# Patient Record
Sex: FEMALE | Race: WHITE | HISPANIC OR LATINO | Employment: OTHER | URBAN - METROPOLITAN AREA
[De-identification: names, ages, dates, MRNs, and addresses within clinical notes are randomized per-mention and may not be internally consistent; named-entity substitution may affect disease eponyms.]

---

## 2018-05-21 ENCOUNTER — TELEPHONE (OUTPATIENT)
Dept: UROGYNECOLOGY | Facility: CLINIC | Age: 38
End: 2018-05-21

## 2018-05-21 ENCOUNTER — OFFICE VISIT (OUTPATIENT)
Dept: OBSTETRICS AND GYNECOLOGY | Facility: CLINIC | Age: 38
End: 2018-05-21
Payer: MEDICAID

## 2018-05-21 VITALS
WEIGHT: 142 LBS | BODY MASS INDEX: 25.16 KG/M2 | HEIGHT: 63 IN | SYSTOLIC BLOOD PRESSURE: 100 MMHG | DIASTOLIC BLOOD PRESSURE: 60 MMHG

## 2018-05-21 DIAGNOSIS — R30.0 DYSURIA: Primary | ICD-10-CM

## 2018-05-21 DIAGNOSIS — N39.498 OTHER URINARY INCONTINENCE: ICD-10-CM

## 2018-05-21 DIAGNOSIS — R10.2 PELVIC PAIN IN FEMALE: ICD-10-CM

## 2018-05-21 PROCEDURE — 87480 CANDIDA DNA DIR PROBE: CPT

## 2018-05-21 PROCEDURE — 87086 URINE CULTURE/COLONY COUNT: CPT

## 2018-05-21 PROCEDURE — 99214 OFFICE O/P EST MOD 30 MIN: CPT | Mod: PBBFAC,PO | Performed by: OBSTETRICS & GYNECOLOGY

## 2018-05-21 PROCEDURE — 87510 GARDNER VAG DNA DIR PROBE: CPT

## 2018-05-21 PROCEDURE — 99999 PR PBB SHADOW E&M-EST. PATIENT-LVL IV: CPT | Mod: PBBFAC,,, | Performed by: OBSTETRICS & GYNECOLOGY

## 2018-05-21 PROCEDURE — 99213 OFFICE O/P EST LOW 20 MIN: CPT | Mod: S$PBB,,, | Performed by: OBSTETRICS & GYNECOLOGY

## 2018-05-21 RX ORDER — CEPHALEXIN 500 MG/1
500 CAPSULE ORAL EVERY 12 HOURS
Qty: 20 CAPSULE | Refills: 0 | Status: SHIPPED | OUTPATIENT
Start: 2018-05-21 | End: 2018-05-31

## 2018-05-21 RX ORDER — CIPROFLOXACIN 500 MG/1
TABLET ORAL
Refills: 0 | COMMUNITY
Start: 2018-04-30 | End: 2018-05-21

## 2018-05-21 NOTE — PATIENT INSTRUCTIONS
Urogynecology   [phone] [facility] Obstetrics and Gynecology, [facility] [address]   1) AILYN Lara Leise  2) Danielle Madison

## 2018-05-21 NOTE — TELEPHONE ENCOUNTER
----- Message from Analy German sent at 5/21/2018  3:11 PM CDT -----  Can you please help me schedule patient with Dr. Lara, thank you

## 2018-05-21 NOTE — PROGRESS NOTES
"38 yo female who presents to discuss problems with her urine.  Reports that since her  in 2006 she has a difficult time holding her urine. Reports that problem has become much worse in the last 2-3months.  Reports frequent UTIs that improve with medications and then return.  Patient also complains of vaginal itching that comes and goes.  Uses over the counter anti-yeast medications that help her symptoms.  But, vaginal itching always returns.    The patient is also concerned about pain in the lower pelvis. Unsure if she may have a cyst. Reports h/o ovarian cysts.    ROS: per HPI    PE:   Vitals: /60   Ht 5' 3" (1.6 m)   Wt 64.4 kg (141 lb 15.6 oz)   LMP 2018   BMI 25.15 kg/m²   APPEARANCE: Well nourished, well developed, in no acute distress.  PELVIC: Normal external female genitalia without lesions. Normal hair distribution. Adequate perineal body, normal urethral meatus. Vagina moist and well rugated without lesions or discharge. Cervix pink and without lesions. No significant cystocele or rectocele. Bimanual exam showed uterus normal size, shape, position, mobile and nontender. There is a mass anterior, palpable with deep bimanual palpation in the LLQ. Feels round.  Positive ttp. Adnexa without masses or tenderness. Urethra and bladder normal.  EXTREMITIES: No clubbing cyanosis or edema.    AP:  1) Vaginitis  Affirm collected    2) Dysuria, urinary incontinence  UA negative  Urine cx sent: rx for keflex sent  Referred to urogyn    3) pelvic pain/mass  Pelvic US ordered    F/u in 2 wks, needs pap at next visit    keyon haider MD    "

## 2018-05-22 LAB
CANDIDA RRNA VAG QL PROBE: NEGATIVE
G VAGINALIS RRNA GENITAL QL PROBE: NEGATIVE
T VAGINALIS RRNA GENITAL QL PROBE: NEGATIVE

## 2018-05-23 LAB — BACTERIA UR CULT: NO GROWTH

## 2018-05-31 ENCOUNTER — INITIAL CONSULT (OUTPATIENT)
Dept: UROGYNECOLOGY | Facility: CLINIC | Age: 38
End: 2018-05-31
Attending: OBSTETRICS & GYNECOLOGY
Payer: MEDICAID

## 2018-05-31 VITALS
WEIGHT: 144.19 LBS | BODY MASS INDEX: 25.55 KG/M2 | HEIGHT: 63 IN | DIASTOLIC BLOOD PRESSURE: 76 MMHG | SYSTOLIC BLOOD PRESSURE: 100 MMHG

## 2018-05-31 DIAGNOSIS — Z12.4 CERVICAL CANCER SCREENING: ICD-10-CM

## 2018-05-31 DIAGNOSIS — N89.8 VAGINAL DISCHARGE: ICD-10-CM

## 2018-05-31 DIAGNOSIS — R30.0 BURNING WITH URINATION: Primary | ICD-10-CM

## 2018-05-31 DIAGNOSIS — N32.89 BLADDER SPASM: ICD-10-CM

## 2018-05-31 DIAGNOSIS — K59.09 CHRONIC CONSTIPATION: ICD-10-CM

## 2018-05-31 DIAGNOSIS — R35.1 NOCTURIA MORE THAN TWICE PER NIGHT: ICD-10-CM

## 2018-05-31 DIAGNOSIS — N39.46 URINARY INCONTINENCE, MIXED: ICD-10-CM

## 2018-05-31 PROCEDURE — 51701 INSERT BLADDER CATHETER: CPT | Mod: S$PBB,,, | Performed by: OBSTETRICS & GYNECOLOGY

## 2018-05-31 PROCEDURE — 87510 GARDNER VAG DNA DIR PROBE: CPT

## 2018-05-31 PROCEDURE — 99213 OFFICE O/P EST LOW 20 MIN: CPT | Mod: PBBFAC | Performed by: OBSTETRICS & GYNECOLOGY

## 2018-05-31 PROCEDURE — 88175 CYTOPATH C/V AUTO FLUID REDO: CPT

## 2018-05-31 PROCEDURE — 99215 OFFICE O/P EST HI 40 MIN: CPT | Mod: 25,S$PBB,, | Performed by: OBSTETRICS & GYNECOLOGY

## 2018-05-31 PROCEDURE — 87086 URINE CULTURE/COLONY COUNT: CPT

## 2018-05-31 PROCEDURE — 51701 INSERT BLADDER CATHETER: CPT | Mod: PBBFAC | Performed by: OBSTETRICS & GYNECOLOGY

## 2018-05-31 PROCEDURE — 99999 PR PBB SHADOW E&M-EST. PATIENT-LVL III: CPT | Mod: PBBFAC,,, | Performed by: OBSTETRICS & GYNECOLOGY

## 2018-05-31 PROCEDURE — 87480 CANDIDA DNA DIR PROBE: CPT

## 2018-05-31 RX ORDER — OXYBUTYNIN CHLORIDE 5 MG/1
5 TABLET, EXTENDED RELEASE ORAL DAILY
Qty: 30 TABLET | Refills: 11 | Status: SHIPPED | OUTPATIENT
Start: 2018-05-31 | End: 2019-06-13

## 2018-05-31 NOTE — LETTER
May 31, 2018      Royce Ruiz MD  [address]  [address]  [facility] [address]           [facility] [address] [place] [address]  Phone: [phone]          Patient: Kenya Linares   MR Number: [id number]   YOB: 1980   Date of Visit: 5/31/2018       Dear Dr. Royce Ruiz:    Thank you for referring Kenya Linares to me for evaluation. Attached you will find relevant portions of my assessment and plan of care.    If you have questions, please do not hesitate to call me. I look forward to following Kenya Linares along with you.    Sincerely,    Sal Lara MD    Enclosure  CC:  No Recipients    If you would like to receive this communication electronically, please contact [email or web address] or [phone] to request more information on [employer] access.    For providers and/or their staff who would like to refer a patient to [facility], please contact us through our one-stop-shop provider referral line, [facility], at [phone].    If you feel you have received this communication in error or would no longer like to receive these types of communications, please e-mail [email or web address]

## 2018-05-31 NOTE — PATIENT INSTRUCTIONS
Hoja de información de fibra  Mcdonald médico le ha recomendado seguir rené dieta kolby en fibra. La adición de fibra a mcdonald dieta puede hacer rené gran diferencia en el control y la regularidad de mcdonald intestino. La fibra ayuda a las personas ya sea que pierdan las heces o tengan problemas de estreñimiento. La fibra funciona abultando las heces y manteniéndolas formadas, haciendo que el movimiento sea suave y fácil de pasar. La fibra ayuda a mantener la humedad dentro de las heces para que no ocurra diarrea ni heces duras. La fibra hace que los intestinos funcionen más regularmente, dajuan no es un laxante. René ventaja adicional de comer rené dieta kolby en fibra es que mcdonald riesgo de cáncer también se reduce.    La mayoría de nosotros ya comemos algunos alimentos ricos en fibra, dajuan claudia todos nosotros no comemos la cantidad necesaria. Por ejemplo, rené rebanada de pan integral contiene solo alrededor del 10% de la cantidad diaria recomendada de fibra. [place] significa que si confía solo en pan de ana integral para cumplir con los requisitos de fibra recomendados, tendría que comer  ¡entre 10 y 18 rebanadas al día! Tenga en cuenta que la fibra NO se encuentra en ningún producto cárnico o lácteo. Solo se encuentra en granos, vegetales y frutas. La ingesta de fibra diaria recomendada es de 20 a 25 gramos. Los alimentos que tienen un alto contenido de fibra incluyen:    Fiber One Cereal, ½ taza 13.0 g  Frijoles Urias, ¾ de taza 10.4 g  Cereal de salvado de ana, 1 oz 10.0 g  Frijoles, ¾ taza 9.3 g  Todos los cereales de salvado, ½ taza 6.0 g  Oat Bran Cereal, caliente, 1 oz 4.0 g  Plátano, 1medio 3.8 g  Peras enlatadas, ½ taza 3.7 g  3 ciruelas pasas o ¼ taza de pasas 3.5 g  Total de ana integral, 1 taza 3.0 g  Zanahorias, ½ taza 3.2 g  Apple, pequeño 2.8 g  Brócoli, ½ taza 2.8 g  Coliflor, ½ taza 2.6 g  Larisa, 1 oz 2.5 g  Whole Wheat Toast 2.0 g  Cheerios, 1 1/3 taza 2.0 g  Papa al horno con piel 2.0 g  Maíz, ½ taza 1.9  g  Palomitas de maíz, 3 tazas 1.9 g  Canova, mediano 1.9 g  Rose Marie de granola 1.0 g  Mary, ½ taza 0.9 g    Si no lachelle que puede obtener suficiente fibra a través de mcdonald dieta diaria, hay muchos buenos suplementos de fibra que puede shayy junto con mcdonald dieta kolby en fibra. Algunos de estos son: Metamucil (1 cucharadita colmada o 1-2 obleas), Citrucel (1 cucharada), Fiberall (1-2 obleas o 1 cucharadita), Perdium (2 cucharaditas redondas) y 1-2 cucharaditas de salvado sin procesar (para mezclar) con alimentos)    Es posible que necesite usar el suplemento de fibra hasta 3-4 veces al día para producir rené eliminación normal. Siga las instrucciones específicas del paquete o llámenos para obtener ayuda para regular la dosis. Puede notar un poco de hinchazón y / o aumento de gas al principio. Estos síntomas se pueden aliviar agregando fibra a mcdonald dieta lentamente. René vez que mcdonald cuerpo se acostumbre a cong aumento de fibra, estos síntomas desaparecerán.       ---------------------------------------------  Irritantes de la vejiga  Ciertos alimentos y bebidas se wilkins asociado con el empeoramiento de los síntomas de frecuencia urinaria, urgencia, incontinencia de urgencia o dolor de la vejiga. Si padece alguna de estas condiciones, puede intentar eliminar mimi o más de estos alimentos de mcdonald dieta y xuan si sukhwinder síntomas mejoran. Si los síntomas de la vejiga están relacionados con factores dietéticos, la adherencia estricta a rené dieta que minimiza la comida debería traer un alivio notable en 10 días. René vez que se sienta mejor, puede comenzar a agregar alimentos nuevamente a mcdonald dieta, mimi a la vez. Si los síntomas regresan, podrá identificar el irritante. A medida que agrega alimentos a mcdonald dieta, es muy importante que satya cantidades significativas de agua.    -------------------------------------------------- ---------------------------------------------  Lista de irritantes vesicales comunes *  Bebidas alcohólicas  Manzanas y  jugo de manzana  [facility]  Bebidas con gas  Chile y comidas picantes  Chocolate  Fruta cítrica  Café (incluido descafeinado)  Arándanos y jugo de arándano  Uvas  [place]  Productos lácteos: leche, queso, requesón, yogur, helado  Melocotones  Benavides  Ciruelas  Fresas  Azúcar especialmente edulcorantes artificiales, sacarina, aspartamo, edulcorantes de maíz, miel, fructosa, sacarosa, lactosa  Té  Tomates y jugo de tomate  Complejo de vitamina B  Vinagre  * La mayoría de las personas no son sensibles a TODOS estos productos; mcdonald objetivo es encontrar los alimentos que empeoran sukhwinder síntomas.  -------------------------------------------------- -------------------------------------------------    Las sustituciones de frutas bajas en ácido incluyen albaricoques, papaya, peras y sandía. Los bebedores de café pueden beber Kava u otras bebidas instantáneas de bajo contenido en ácido. Los bebedores de té pueden sustituir los tés no cítricos a base de hierbas y sol. El carbonato de calcio co-tamponado con ascorbato de calcio puede ser sustituido por vitamina C. Prelief es un suplemento dietético que funciona julianne un bloqueador de ácido para la vejiga.    Dónde obtener más información:   Superación de los trastornos de la vejiga por Elizabeth Soni y Karel Lala, 1990   ¡No tienes que vivir con cistitis! Por Bhavna Hdz, 1988  [email or web address]    -------------------------------------------------------------------------------------    1) Incontinencia urinaria mixta, urgencia <estrés:  --urine C & S enviado hoy  - Vacíe la vejiga cada 3 horas. Vacíe iam: espere un minuto e inclínese hacia adelante en el inodoro.  - Evite los irritantes dietéticos (patricia la hoja). Mantenga un diario x 3-5 días para determinar sukhwinder irritantes.  --KEGELS: haz 10 en AM y 10 en PM, manteniendo cada x 10 segundos. Cuando sienta la necesidad de ir, DETÉNGASE, KEGEL, y cuando haya pasado la urgencia,  vaya al baño. Comience el piso pélvico PT. Por favor llame dentro de unos días para hacer rené jose: Brigitte Zamora ([facility] [place]): (p) [phone] / [phone]. (f) [phone]. Llamada de pacientes establecida: [phone].  --URGE: start Oxybutynin 5 xl al día. Isabelle de 2 a 4 semanas para xuan si tendrá efecto. Para la boca seca: obtenga pastillas o chicles amargos, sin azúcar.  --STRESS: Pessary vs. Sling.    2) Ardor al orinar,? Espasmos de vejiga:  --urine C & S  --mycoplasma / ureaplasma (neg C & S recientemente)  -trabajo en controlar el impulso según el n. ° 1  - medicamento antiespamático de inicio: Oxybutynin 5 xl al día    3) descarga vaginal:  --firma enviada    4) Estreñimiento:  Controlar el estreñimiento puede ayudar a la urgencia / fuga de la vejiga y la fibra puede controlar mejor el colesterol y la glucosa en toño. Comience la fibra diaria. [place] 1 cucharadita de fibra en polvo (psyllium u otro polvo sin azúcar). Mezcle en 8 onzas de agua. Isabelle x 3-5 días. Luego, aumente la fibra en 1 cucharadita cada 3-5 días hasta que las heces ortega fáciles de pasar. Deténgase y continúe con kasia dosis. No exceda 6 cucharadas / día. También puede usar suavizante para heces sin receta 1-2 x / día. EVITAR laxantes.    5) Nocturia (micción nocturna): suspenda los líquidos 2 horas antes de acostarse / sin agua junto a la cama. Si tiene hinchazón en la pierna: eleve los pies por encima del pecho x 1 hora antes de acostarse para eliminar el exceso de líquido. También puede usar rené manguera de soporte (altas de rodilla).    6) Regrese a la clínica en 3 meses.

## 2018-05-31 NOTE — PROGRESS NOTES
[facility] [address] [place] [address]    Kenya Linares  [id number]  1980  May 31, 2018    Consulting Physician: Royce Ruiz MD   GYN: Dr. Joseph Sorenson M.D.: Primary Doctor No    Chief Complaint   Patient presents with    Advice Only     urinary incontinence       HPI:     36yo  with urinary incontinence, referred by Dr. Ruiz.    1)  UI:  (+) RE > (--) UUI  X multiple times per day (anytime laugh/cough/sneeze/valsalva).  (+) pads:1/day, usually severe wetness and none at night.  Daytime frequency: Q 1 hours.  Nocturia: Yes: 1/night.   (+) dysuria, currently taking keflex (neg Ucx )--this has improved but still present; is having some spasms in SP area x 3 d;   (--) hematuria,  (--) frequent UTIs.  (--) complete bladder emptying. Urinary urgency and frequency, no leakage with urge symptoms.    2)  POP:  Absent. (+) vaginal bleeding (menstrual cycles regular). (+) vaginal discharge after taking antibitoics. (+) sexually active.  (--) dyspareunia.   (+)  Vaginal dryness.  (--) vaginal estrogen use.     3)  BM:  (+) constipation/straining. Taking miralax occasionally.  (--) chronic diarrhea. (--) hematochezia.  (--) fecal incontinence.  (--) fecal smearing/urgency.  (+) complete evacuation.     Past Medical History  Past Medical History:   Diagnosis Date    PONV (postoperative nausea and vomiting)         Past Surgical History  Past Surgical History:   Procedure Laterality Date     SECTION      TUBAL LIGATION     cs x 1 (pfannenstiel)   Lap BS 2016    Hysterectomy: No    Past Ob History    C/s x 1.    Largest infant weight: 4lb  no FAVD. no episiotomy.      Gynecologic History  LMP: Patient's last menstrual period was 2018 (exact date).  Age of menarche: 14  Age of menopause: n/a  Menstrual history: regular cycles, q28 days, moderate flow  Pap test: 3 years ago - normal.  History of abnormal paps: No.  History of STIs:  " No  Mammogram: Date of last: n/a.  Colonoscopy: Date of last: n/a   DEXA:  Date of last: n/a    Family History  Family History   Problem Relation Age of Onset    Hypertension Mother     Cervical cancer Maternal Aunt     Cancer Paternal Aunt     Breast cancer Maternal Grandmother       Colon CA: No  Breast CA: Yes - MGM  GYN CA: No   CA: No    Social History  History   Smoking Status    Never Smoker   Smokeless Tobacco    Not on file   .  History   Alcohol Use No   .    History   Drug use: Unknown   .  The patient is engaged.  Resides in [place] [address].  Employment status: currently employed.    Self employed    Allergies  Review of patient's allergies indicates:   Allergen Reactions    Roxicet [oxycodone-acetaminophen] Itching and Edema     Starting taking Roxicet Friday.  Starting experieincing facial swelling and general itching Saturday.       Medications  Current Outpatient Prescriptions on File Prior to Visit   Medication Sig Dispense Refill    cephALEXin (KEFLEX) 500 MG capsule Take 1 capsule (500 mg total) by mouth every 12 (twelve) hours. 20 capsule 0     No current facility-administered medications on file prior to visit.        Review of Systems A 14 point ROS was reviewed with pertinent positives as noted above in the history of present illness.      Constitutional: negative  Eyes: negative  Endocrine: negative  Gastrointestinal: negative  Cardiovascular: negative  Respiratory: negative  Allergic/Immunologic: negative  Integumentary: negative  Psychiatric: negative  Musculoskeletal: negative   Ear/Nose/Throat: negative  Neurologic: negative  Genitourinary: SEE HPI  Hematologic/Lymphatic: negative   Breast: negative    Urogynecologic Exam  /76 (BP Location: Right arm, Patient Position: Sitting, BP Method: Large (Manual))   Ht 5' 3" (1.6 m)   Wt 65.4 kg (144 lb 2.9 oz)   LMP 05/18/2018 (Exact Date)   BMI 25.54 kg/m²     GENERAL APPEARANCE:  The patient is well-developed, " well-nourished.   Neck:  Supple with no thyromegaly, no carotid bruits.  Heart:  Regular rate and rhythm, no murmurs, rubs or gallops.  Lungs:  Clear.  No CVA tenderness.  Abdomen:  Soft, nontender, nondistended, no hepatosplenomegaly.  Incisions:  Pfanennstiel and LSC well-healed    PELVIC:    External genitalia:  Normal Bartholins, Skenes and labia bilaterally.    Urethra:  No caruncle, diverticulum or masses.  (+) hypermobility.    Vagina:  Atrophy (--) , no bladder masses or tender, no discharge.  Affirm collected per patient symptoms.   Cervix:  normal appearance; pap collected  Uterus: normal size, contour, position, consistency, mobility, non-tender  Adnexa: Not palpable.    POP-Q:    Deferred.  No obvious POP present with valsalva.     NEUROLOGIC:  Cranial nerves 2 through 12 intact.  Strength 5/5.  DTRs 2+ lower extremities.  S2 through 4 normal.  Sacral reflexes intact.    EXT: MCDUFFIE, 2+ pulses bilaterally, no C/C/E    COUGH STRESS TEST:  negative  KEGEL: 3 /5    RECTAL:    External:  Normal, (--) hemorrhoids, (--) dovetailing.   Internal:  deferred    PVR: 10 mL    Impression    1. Burning with urination    2. Cervical cancer screening    3. Bladder spasm    4. Urinary incontinence, mixed    5. Chronic constipation    6. Nocturia more than twice per night    7. Vaginal discharge        Initial Plan  The patient was counseled regarding these issues. The patient was given a summary sheet containing each of these issues with possible options for evaluation and management. When appropriate, we also reviewed computer-generated diagrams specific to their diagnoses..  All questions were addressed to the patient's satisfaction.    1)  Mixed urinary incontinence, urge < stress:    --urine C&S sent today  --Empty bladder every 3 hours.  Empty well: wait a minute, lean forward on toilet.    --Avoid dietary irritants (see sheet).  Keep diary x 3-5 days to determine your irritants.  --KEGELS: do 10 in AM and 10 in PM,  holding each x 10 seconds.  When you feel urge to go, STOP, KEGEL, and when urge has passed, then go to bathroom.  Start pelvic floor PT.  Please call in a few days to make an appointment:  Brigitte Zamora ([facility] (p) [phone]. (f) [phone]. Established patients call:  [phone].  --URGE: start Oxybutynin 5 xl daily Takes 2-4 weeks to see if will have effect.  For dry mouth: get sour, sugar free lozenge or gum.    --STRESS:  Pessary vs. Sling.     2)  Burning with urination, ? Bladder spasms:  --urine C&S  --mycoplasma/ureaplasma (neg C&S recently)  --work on controlling urge as per #1  --start anti-spasm medication:  Oxybutynin 5 xl daily    3)  Vaginal discharge:  --affirm sent    4)  Constipation:  Controlling constipation may help bladder urgency/leakage and fiber may better control cholesterol and blood glucose.  Start daily fiber.  Take 1 tsp of fiber powder (psyllium or other sugar-free powder).  Mix in 8 oz of water.  Take x 3-5 days.  Then, increase fiber by 1 tsp every 3-5 days until stool is easy to pass.  Stop and continue at that dose.   Do not exceed 6 tsps/day.  May also use over the counter stool softener 1-2 x/day.  AVOID laxatives.    5)  Nocturia (nighttime urination): stop fluids 2 hours before bed/no water by the bed.  If have leg swelling:  Elevate feet above chest x 1 hour before bed to get excess fluid off.  Can also use support hose (knee highs).      6)  Return to clinic in 3 months.     Approximately 60 min were spent in consult, 90 % in discussion.     Thank you for requesting consultation of your patient.  I look forward to participating in their care.  Patient mostly fluent in spoken English.  Daughter was present to assist if needed, but no major assist needed.     Sal Lara  Female Pelvic Medicine and Reconstructive Surgery  [facility] [place], [place]

## 2018-06-02 LAB — BACTERIA UR CULT: NO GROWTH

## 2018-06-07 ENCOUNTER — TELEPHONE (OUTPATIENT)
Dept: OBSTETRICS AND GYNECOLOGY | Facility: CLINIC | Age: 38
End: 2018-06-07

## 2018-06-08 ENCOUNTER — PROCEDURE VISIT (OUTPATIENT)
Dept: OBSTETRICS AND GYNECOLOGY | Facility: CLINIC | Age: 38
End: 2018-06-08
Payer: MEDICAID

## 2018-06-08 ENCOUNTER — OFFICE VISIT (OUTPATIENT)
Dept: OBSTETRICS AND GYNECOLOGY | Facility: CLINIC | Age: 38
End: 2018-06-08
Payer: MEDICAID

## 2018-06-08 VITALS
WEIGHT: 141.56 LBS | HEIGHT: 63 IN | BODY MASS INDEX: 25.08 KG/M2 | SYSTOLIC BLOOD PRESSURE: 100 MMHG | DIASTOLIC BLOOD PRESSURE: 60 MMHG

## 2018-06-08 DIAGNOSIS — B96.89 BV (BACTERIAL VAGINOSIS): ICD-10-CM

## 2018-06-08 DIAGNOSIS — N76.0 BV (BACTERIAL VAGINOSIS): ICD-10-CM

## 2018-06-08 DIAGNOSIS — R10.2 PELVIC PAIN IN FEMALE: ICD-10-CM

## 2018-06-08 DIAGNOSIS — R10.2 PELVIC PAIN IN FEMALE: Primary | ICD-10-CM

## 2018-06-08 PROCEDURE — 99999 PR PBB SHADOW E&M-EST. PATIENT-LVL III: CPT | Mod: PBBFAC,,, | Performed by: OBSTETRICS & GYNECOLOGY

## 2018-06-08 PROCEDURE — 76830 TRANSVAGINAL US NON-OB: CPT | Mod: PBBFAC,PO

## 2018-06-08 PROCEDURE — 99212 OFFICE O/P EST SF 10 MIN: CPT | Mod: S$PBB,25,, | Performed by: OBSTETRICS & GYNECOLOGY

## 2018-06-08 PROCEDURE — 99213 OFFICE O/P EST LOW 20 MIN: CPT | Mod: PBBFAC,PO | Performed by: OBSTETRICS & GYNECOLOGY

## 2018-06-08 PROCEDURE — 76830 TRANSVAGINAL US NON-OB: CPT | Mod: 26,S$PBB,, | Performed by: OBSTETRICS & GYNECOLOGY

## 2018-06-08 RX ORDER — METRONIDAZOLE 500 MG/1
TABLET ORAL
Qty: 30 TABLET | Refills: 3 | Status: SHIPPED | OUTPATIENT
Start: 2018-06-08 | End: 2018-12-19

## 2018-06-08 NOTE — PROGRESS NOTES
36 yo female who presents to discuss US results.  US shows no abnormalities.  Patient verbalizes understanding.  Patient is worried about discharge with odor that comes and goes with her cycle.  rx for flagyl provided to treat BV infections.    keyon haider MD

## 2018-06-19 ENCOUNTER — PATIENT MESSAGE (OUTPATIENT)
Dept: OBSTETRICS AND GYNECOLOGY | Facility: CLINIC | Age: 38
End: 2018-06-19

## 2018-06-19 NOTE — TELEPHONE ENCOUNTER
Gokul doctora Joseph Crane. Puede cambiarme el medicamento que me ashlyn a shayy para la vaginitis bacteriana. Yo hice el ciclo de 7 días y no funcionó. Henriqueyair Linares

## 2018-06-22 DIAGNOSIS — N76.0 BV (BACTERIAL VAGINOSIS): Primary | ICD-10-CM

## 2018-06-22 DIAGNOSIS — B96.89 BV (BACTERIAL VAGINOSIS): Primary | ICD-10-CM

## 2018-06-22 RX ORDER — CLINDAMYCIN HYDROCHLORIDE 300 MG/1
300 CAPSULE ORAL EVERY 8 HOURS
Qty: 30 CAPSULE | Refills: 0 | Status: SHIPPED | OUTPATIENT
Start: 2018-06-22 | End: 2018-07-02

## 2018-11-17 ENCOUNTER — PATIENT MESSAGE (OUTPATIENT)
Dept: OBSTETRICS AND GYNECOLOGY | Facility: CLINIC | Age: 38
End: 2018-11-17

## 2018-11-19 ENCOUNTER — TELEPHONE (OUTPATIENT)
Dept: OBSTETRICS AND GYNECOLOGY | Facility: CLINIC | Age: 38
End: 2018-11-19

## 2018-11-19 DIAGNOSIS — R10.2 PELVIC PAIN: Primary | ICD-10-CM

## 2018-11-19 NOTE — TELEPHONE ENCOUNTER
Gokul Karoline Crane N. Me pude alayna rené receta de clindamicyn. Usted me ashlyn el metronidazole en tabletas dajuan cong no me funciona. La ultima ves cambiamos el medicamento para el clindamicyn y me funciono. Si puede me le pone   refill por favor.   Henrique. Michael Linares.

## 2018-11-19 NOTE — TELEPHONE ENCOUNTER
----- Message from [facility], System Message sent at 11/19/2018  2:58 PM CST -----      Appointment Request From: Kenya Linares      With Provider: Royce Ruiz MD [[facility] - OB/GYN]      Preferred Date Range: Any      Preferred Times: Cualquier hora      Reason for visit: Me siento inflamada y con dolor de ovarios      Comments:   Me siento inflamada y con dolor de ovarios.

## 2018-11-20 RX ORDER — MELOXICAM 15 MG/1
15 TABLET ORAL DAILY PRN
Qty: 30 TABLET | Refills: 0 | Status: SHIPPED | OUTPATIENT
Start: 2018-11-20 | End: 2019-06-13

## 2018-11-20 NOTE — TELEPHONE ENCOUNTER
Patient is still complaining of having rt ovary pain along with inflammation. Patient is scheduled to see Dr. Ruiz 12/11/2018 at 8:45am, but patient would like something for pain and inflammation called in to hold her til her appointment. Please advise

## 2018-11-21 DIAGNOSIS — N76.1 CHRONIC VAGINITIS: Primary | ICD-10-CM

## 2018-11-21 RX ORDER — CLINDAMYCIN HYDROCHLORIDE 300 MG/1
300 CAPSULE ORAL 2 TIMES DAILY
Qty: 30 CAPSULE | Refills: 4 | Status: SHIPPED | OUTPATIENT
Start: 2018-11-21 | End: 2018-11-28

## 2018-12-07 ENCOUNTER — TELEPHONE (OUTPATIENT)
Dept: UROGYNECOLOGY | Facility: CLINIC | Age: 38
End: 2018-12-07

## 2018-12-07 NOTE — TELEPHONE ENCOUNTER
Pradeep Saucedo,    Can you contact the pt and help her schedule a follow up with Dr. Lara. The pt has medicaid as insurance so it may a while to get the pt scheduled.    Thank you,  Cruz

## 2018-12-19 ENCOUNTER — OFFICE VISIT (OUTPATIENT)
Dept: OBSTETRICS AND GYNECOLOGY | Facility: CLINIC | Age: 38
End: 2018-12-19
Payer: MEDICAID

## 2018-12-19 VITALS
HEIGHT: 63 IN | DIASTOLIC BLOOD PRESSURE: 64 MMHG | SYSTOLIC BLOOD PRESSURE: 120 MMHG | WEIGHT: 140.63 LBS | BODY MASS INDEX: 24.92 KG/M2

## 2018-12-19 DIAGNOSIS — N76.1 CHRONIC VAGINITIS: Primary | ICD-10-CM

## 2018-12-19 PROCEDURE — 99999 PR PBB SHADOW E&M-EST. PATIENT-LVL III: CPT | Mod: PBBFAC,,, | Performed by: OBSTETRICS & GYNECOLOGY

## 2018-12-19 PROCEDURE — 99213 OFFICE O/P EST LOW 20 MIN: CPT | Mod: PBBFAC,PO | Performed by: OBSTETRICS & GYNECOLOGY

## 2018-12-19 PROCEDURE — 99212 OFFICE O/P EST SF 10 MIN: CPT | Mod: S$PBB,,, | Performed by: OBSTETRICS & GYNECOLOGY

## 2018-12-19 RX ORDER — METRONIDAZOLE 7.5 MG/G
1 GEL VAGINAL NIGHTLY
Qty: 70 G | Refills: 3 | Status: SHIPPED | OUTPATIENT
Start: 2018-12-19 | End: 2018-12-24

## 2018-12-19 RX ORDER — LEVONORGESTREL / ETHINYL ESTRADIOL AND ETHINYL ESTRADIOL 150-30(84)
1 KIT ORAL DAILY
Qty: 90 EACH | Refills: 4 | Status: SHIPPED | OUTPATIENT
Start: 2018-12-19 | End: 2019-06-13

## 2018-12-19 NOTE — PROGRESS NOTES
39 yo female who presents to discuss chronic BV.  Reports that vaginal discharge with odor/irriation occurs after every menstrual cycle.  She desires to try OCP x 3 months to see if having less menstrual cycles will decrease episodes of BV.  On vaginal exam, white discharge noted; no odor.    Rx for seasonique provided.    keyon haider MD

## 2019-01-24 ENCOUNTER — OFFICE VISIT (OUTPATIENT)
Dept: UROGYNECOLOGY | Facility: CLINIC | Age: 39
End: 2019-01-24
Payer: MEDICAID

## 2019-01-24 VITALS
SYSTOLIC BLOOD PRESSURE: 114 MMHG | DIASTOLIC BLOOD PRESSURE: 78 MMHG | WEIGHT: 141.75 LBS | BODY MASS INDEX: 25.12 KG/M2 | HEIGHT: 63 IN

## 2019-01-24 DIAGNOSIS — N39.46 URINARY INCONTINENCE, MIXED: ICD-10-CM

## 2019-01-24 DIAGNOSIS — R30.0 BURNING WITH URINATION: ICD-10-CM

## 2019-01-24 DIAGNOSIS — Z90.79 STATUS POST BILATERAL SALPINGECTOMY: ICD-10-CM

## 2019-01-24 DIAGNOSIS — N32.89 BLADDER SPASM: ICD-10-CM

## 2019-01-24 DIAGNOSIS — N89.8 VAGINAL DISCHARGE: ICD-10-CM

## 2019-01-24 DIAGNOSIS — N39.41 URINARY INCONTINENCE, URGE: Primary | ICD-10-CM

## 2019-01-24 DIAGNOSIS — R35.1 NOCTURIA MORE THAN TWICE PER NIGHT: ICD-10-CM

## 2019-01-24 DIAGNOSIS — K59.09 CHRONIC CONSTIPATION: ICD-10-CM

## 2019-01-24 PROCEDURE — 99999 PR PBB SHADOW E&M-EST. PATIENT-LVL III: CPT | Mod: PBBFAC,,, | Performed by: OBSTETRICS & GYNECOLOGY

## 2019-01-24 PROCEDURE — 99213 PR OFFICE/OUTPT VISIT, EST, LEVL III, 20-29 MIN: ICD-10-PCS | Mod: S$PBB,,, | Performed by: OBSTETRICS & GYNECOLOGY

## 2019-01-24 PROCEDURE — 99213 OFFICE O/P EST LOW 20 MIN: CPT | Mod: S$PBB,,, | Performed by: OBSTETRICS & GYNECOLOGY

## 2019-01-24 PROCEDURE — 99999 PR PBB SHADOW E&M-EST. PATIENT-LVL III: ICD-10-PCS | Mod: PBBFAC,,, | Performed by: OBSTETRICS & GYNECOLOGY

## 2019-01-24 PROCEDURE — 99213 OFFICE O/P EST LOW 20 MIN: CPT | Mod: PBBFAC | Performed by: OBSTETRICS & GYNECOLOGY

## 2019-01-24 RX ORDER — TROSPIUM CHLORIDE 20 MG/1
20 TABLET, FILM COATED ORAL 2 TIMES DAILY
Qty: 60 TABLET | Refills: 11 | Status: SHIPPED | OUTPATIENT
Start: 2019-01-24 | End: 2019-06-13

## 2019-01-24 NOTE — PATIENT INSTRUCTIONS
Los ejercicios de Kegel:    Los ejercicios de Kegel: René guía de instrucciones para las mujeres   Los ejercicios de Kegel pueden ayudar a prevenir o controlar la incontinencia urinaria y otros problemas del piso pélvico. He aquí rené guía paso a paso para hacer los ejercicios de Kegel correctamente. Por [facility] Staff       Los ejercicios de Kegel fortalecen los músculos del suelo pélvico, que sostienen el útero, la vejiga, el intestino hannah y el recto. Usted puede hacer ejercicios de Kegel, también conocido julianne entrenamiento de los músculos del suelo pélvico, discretamente claudia en cualquier momento.     Comience por entender lo que los ejercicios de Kegel pueden hacer por usted - a continuación, siga paso a paso las instrucciones para contraer y relajar los músculos del suelo pélvico.       ¿Por qué los ejercicios de Kegel importa         Hay muchos factores que pueden debilitar los músculos del suelo pélvico, julianne el embarazo, el parto, la cirugía, el envejecimiento y el sobrepeso.     Usted puede beneficiarse de hacer los ejercicios de Kegel si:    Fuga unas gotas de orina, mientras que estornudar, reír o toser    Tener rené bin y repentina de orinar duncan antes de perder rené gran cantidad de orina (incontinencia urinaria)    heces de fugas (incontinencia fecal)     Los ejercicios de Kegel se pueden hacer ira el embarazo o después del parto para tratar de prevenir la incontinencia urinaria. Ejercicios de Kegel - junto con el asesoramiento y la terapia sexual - también puede ser útil para las mujeres que tienen dificultad persistente para alcanzar el orgasmo.     Tenga en cuenta que los ejercicios de Kegel son menos útiles para las mujeres que tienen graves pérdidas de orina al estornudar, toser o reír. Además, los ejercicios de Kegel no son útiles para las mujeres que inesperadamente carlyn escapar pequeñas cantidades de orina debido a rené vejiga llena (incontinencia por rebosamiento).       Cómo  hacer los ejercicios de Kegel         Se requiere diligencia para identificar los músculos del suelo pélvico y aprender a contraer y relajar ellos. Éstos son algunos consejos:    Encontrar los músculos correctos. Para identificar los músculos del suelo pélvico, deje de orinar en medio de la corriente. Si tienes éxito, tienes los músculos correctos.    Perfecciona tu técnica. René vez que haya identificado los músculos del suelo pélvico, vaciar mcdonald vejiga y se acueste boca arriba. Apriete los músculos del suelo pélvico, mantenga la contracción ira lulu segundos y después relájese ira lulu segundos. Inténtelo cuatro o lulu veces seguidas. Trabajar hasta mantener los músculos contraídos ira 10 segundos a la vez, relajante ira 10 segundos entre las contracciones.    Mantenga mcdonald enfoque. Para obtener los mejores resultados, se centran en sólo apretar los músculos del piso pélvico. Tenga cuidado de no flexionar los músculos en el abdomen, los muslos o las nalgas. Evite sostener la respiración. En mcdonald lugar, respirar libremente ira los ejercicios.    Repita 3 veces al día. Apunte por lo menos yasmin series de 10 repeticiones al día.     No rustam un hábito de utilizar los ejercicios de Kegel para iniciar y detener el flujo de orina. Hacer los ejercicios de Kegel mientras vacía mcdonald vejiga en realidad puede debilitar los músculos, así julianne llevar a un vaciamiento incompleto de la vejiga - lo que aumenta el riesgo de rené infección urinaria.     ----------------------------------------------------------------------------    1) incontinencia urinaria mixta, urgencia <estrés:  - Vaciar la vejiga cada 3 horas. Vaciar ima: espere un minuto, inclínese hacia kirt en el inodoro.  --Evitar los irritantes dietéticos (xuan hoja). Mantenga un diario x 3-5 días para determinar sukhwinder irritantes.  - VANE: haz 10 en AM y 10 en PM, manteniendo cada x 10 segundos. Cuando sienta ganas de ir, KATHY, HALEY, y cuando la  urgencia haya pasado, vaya al baño. Podría permitirse el lujo del suelo pélvico PT.  --URGE: Comience Sanctura 20 mg 1-2 veces / día. Si se seca demasiado después de shayy 3-4 semanas, envíeme un mensaje o llame. No se pudo tolerar Oxybutynin 5 xl diariamente (xerostomía). El seguro también cubre detrol 4 LA y flavoxate. Isabelle 2-4 semanas para xuan si tendrá efecto. Para la boca seca: agréguele la pastilla o el chicle sin azúcar.  --STRESS: Pesario vs. Sling.    2) ¿Ardor al orinar? Espasmos vesicales:  --urine C&S neg 5/18  --mycoplasma / ureaplasma (neg C&S recientemente)  - Trabajar en el control de la urgencia julianne en # 1  - Comience un nuevo medicamento contra el espasmo.    3) Estreñimiento:  --continuar para evitar esforzarse con movimientos intestinales  --continuar con la fibra diaria    5) Nocturia (micción nocturna): detenga los líquidos 2 horas antes de acostarse / no hay agua en la cama. Si tiene hinchazón en las piernas: levante los pies por encima del pecho x 1 hora antes de acostarse para eliminar el exceso de líquido. También se puede usar manguera de apoyo (rodilla kolby).    6)  Regreso a la clínica en 3 meses.

## 2019-01-24 NOTE — PROGRESS NOTES
Urogyn follow up  2019    [facility] [address] [id number]    Kenya Linares  [id number]  1980      Kenya Linares is a 38 y.o. here for a urogyn follow up.    36yo  with urinary incontinence, referred by Dr. Ruiz.    1)  UI:  (+) RE > (--) UUI  X multiple times per day (anytime laugh/cough/sneeze/valsalva).  (+) pads:1/day, usually severe wetness and none at night.  Daytime frequency: Q 1 hours.  Nocturia: Yes: 1/night.   (+) dysuria, currently taking keflex (neg Ucx )--this has improved but still present; is having some spasms in SP area x 3 d;   (--) hematuria,  (--) frequent UTIs.  (--) complete bladder emptying. Urinary urgency and frequency, no leakage with urge symptoms.    2)  POP:  Absent. (+) vaginal bleeding (menstrual cycles regular). (+) vaginal discharge after taking antibitoics. (+) sexually active.  (--) dyspareunia.   (+)  Vaginal dryness.  (--) vaginal estrogen use.     3)  BM:  (+) constipation/straining. Taking miralax occasionally.  (--) chronic diarrhea. (--) hematochezia.  (--) fecal incontinence.  (--) fecal smearing/urgency.  (+) complete evacuation.     Past Medical History  Past Medical History:   Diagnosis Date    PONV (postoperative nausea and vomiting)         Past Surgical History  Past Surgical History:   Procedure Laterality Date     SECTION      [id number] Bilateral 2016    Performed by Royce Ruiz [facility] at [facility]    TUBAL LIGATION     cs x 1 (pfannenstiel)   Lap BS 2016    Hysterectomy: No    Past Ob History    C/s x 1.    Largest infant weight: 4lb  no FAVD. no episiotomy.      Gynecologic History  LMP: No LMP recorded (within weeks).  Age of menarche: 14  Age of menopause: n/a  Menstrual history: regular cycles, q28 days, moderate flow  Pap test: 3 years ago - normal.  History of abnormal paps: No.  History of STIs:  No  Mammogram: Date of last:  "n/a.  Colonoscopy: Date of last: n/a   DEXA:  Date of last: n/a      Issues include:  Patient Active Problem List   Diagnosis    Preoperative exam for gynecologic surgery    Status post bilateral salpingectomy    Burning with urination    Bladder spasm    Urinary incontinence, mixed    Chronic constipation    Nocturia more than twice per night    Vaginal discharge       History since last visit:    1)  Mixed urinary incontinence, urge < stress:    --did not notice dietary irriants  --didn't go to PT--too expensive; hasn't been doing Kegels  --tried Oxybutynin 5 xl daily; worked well to control UI; couldn't tolerate SE: xerostomia, trouble swallowing    2)  Burning with urination, ? Bladder spasms:  --urine C&S neg  --mycoplasma/ureaplasma (neg C&S recently)  --work on controlling urge as per #1  --tried Oxybutynin 5 xl daily; worked well to control UI; couldn't tolerate SE: xerostomia, trouble swallowing    3)  Vaginal discharge:  --affirm 5/18 neg    4)  Constipation:  --taking fiber daily, which is controlling     5)  Nocturia (nighttime urination):   --still 1x/PM  --has tried to limit fluid before bed/at night    Medications:    Current Outpatient Medications:     L norgest/e.estradiol-e.estrad (SEASONIQUE) 0.15 mg-30 mcg (84)/10 mcg (7) 3MPk, Take 1 tablet by mouth once daily., Disp: 90 each, Rfl: 4    meloxicam (MOBIC) 15 MG tablet, Take 1 tablet (15 mg total) by mouth daily as needed for Pain., Disp: 30 tablet, Rfl: 0    oxybutynin (DITROPAN-XL) 5 MG TR24, Take 1 tablet (5 mg total) by mouth once daily., Disp: 30 tablet, Rfl: 11    trospium (SANCTURA) 20 mg Tab tablet, Take 1 tablet (20 mg total) by mouth 2 (two) times daily., Disp: 60 tablet, Rfl: 11    ROS:  As per HPI.      Exam  /78 (BP Location: Right arm, Patient Position: Sitting, BP Method: Medium (Manual))   Ht 5' 3" (1.6 m)   Wt 64.3 kg (141 lb 12.1 oz)   LMP  (Within Weeks)   BMI 25.11 kg/m²   General: alert and oriented, no " acute distress  Remainder of PE deferred.     Impression  1. Urinary incontinence, urge  trospium (SANCTURA) 20 mg Tab tablet   2. Bladder spasm     3. Burning with urination     4. Nocturia more than twice per night     5. Status post bilateral salpingectomy     6. Urinary incontinence, mixed     7. Vaginal discharge     8. Chronic constipation       We reviewed the above issues and discussed options for short-term versus long-term management of her problems.   Plan:     1)  Mixed urinary incontinence, urge < stress:    --Empty bladder every 3 hours.  Empty well: wait a minute, lean forward on toilet.    --Avoid dietary irritants (see sheet).  Keep diary x 3-5 days to determine your irritants.  --KEGELS: do 10 in AM and 10 in PM, holding each x 10 seconds.  When you feel urge to go, STOP, KEGEL, and when urge has passed, then go to bathroom.  Could to afford pelvic floor PT.   --URGE: Start Sanctura 20 mg 1-2 times/day.  If too drying after taking 3-4 weeks, send me a message or call.  Could not tolerate Oxybutynin 5 xl daily (xerostomia). Insurance also covers detrol 4 LA and flavoxate. Takes 2-4 weeks to see if will have effect.  For dry mouth: get sour, sugar free lozenge or gum.    --STRESS:  Pessary vs. Sling.     2)  Burning with urination, ? Bladder spasms:  --urine C&S neg 5/18  --mycoplasma/ureaplasma (neg C&S recently)  --work on controlling urge as per #1  --start new anti-spasm medication    3)  Constipation:  --continue to avoid straining with bowel movements  --continue daily fiber    5)  Nocturia (nighttime urination): stop fluids 2 hours before bed/no water by the bed.  If have leg swelling:  Elevate feet above chest x 1 hour before bed to get excess fluid off.  Can also use support hose (knee highs).      6)  Return to clinic in 3 months.     30 minutes were spent in face to face time with this patient  100 % of this time was spent in counseling and/or coordination of care     Sal Lara,  MD  [facility]  Division of Female Pelvic Medicine and Reconstructive Surgery  Department of Obstetrics & Gynecology      Approximately 60 min were spent in consult, 90 % in discussion.     Thank you for requesting consultation of your patient.  I look forward to participating in their care.  Patient mostly fluent in spoken English.  Daughter was present to assist if needed, but no major assist needed.     Sal Lara  Female Pelvic Medicine and Reconstructive Surgery  [facility] [place], [place]

## 2019-06-13 ENCOUNTER — OFFICE VISIT (OUTPATIENT)
Dept: FAMILY MEDICINE | Facility: HOSPITAL | Age: 39
End: 2019-06-13
Attending: FAMILY MEDICINE
Payer: MEDICAID

## 2019-06-13 VITALS
HEART RATE: 76 BPM | WEIGHT: 142 LBS | HEIGHT: 64 IN | SYSTOLIC BLOOD PRESSURE: 107 MMHG | BODY MASS INDEX: 24.24 KG/M2 | DIASTOLIC BLOOD PRESSURE: 70 MMHG

## 2019-06-13 DIAGNOSIS — Z00.01 ENCOUNTER FOR WELL ADULT EXAM WITH ABNORMAL FINDINGS: Primary | ICD-10-CM

## 2019-06-13 PROCEDURE — 99213 OFFICE O/P EST LOW 20 MIN: CPT | Performed by: STUDENT IN AN ORGANIZED HEALTH CARE EDUCATION/TRAINING PROGRAM

## 2019-06-13 NOTE — PROGRESS NOTES
Subjective:       Patient ID: Kenya Linares is a 38 y.o. female.    Chief Complaint: Annual Exam    HPI Ms. Linares is a 39 y/o white female with a PMHx of OAB that presents to the clinic today for her Annual Exam. She states that her cholesterol level last year was 250 and she would like to have her lipid levels rechecked today. She attempted to make dietary changes for weight loss but this was unsuccessful. Today she is complaining of a headache and chronic bone pain. The headache began 3 days ago and is described as sinus pressure. 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 She tried ibuprofen but that provided no relief. She denies rhinorrhea, watery eyes, cough, fever, and dyspnea. The headache has since resolved but was constant for the last 3 days. She has not tried any OTC allergy medications and will try these over the next month.     The bone pain started at around age 21. At the time of onset she saw a physician in [place] but it was attributed to growing pains. She was unable to describe the pain but states that it is episodic, about 2 times a week, and is worse at night and during cold weather. The last time she had the pain was last night. The location is erratic and can occur in any of her extremities. She endorses associated numbness and tingling in her fingers and toes during the pain episodes and chills.     Review of Systems   Constitutional: Negative for activity change, appetite change, fatigue and fever.   HENT: Negative for hearing loss and trouble swallowing.    Eyes: Negative for visual disturbance.   Respiratory: Negative for cough and shortness of breath.    Cardiovascular: Negative for chest pain and leg swelling.   Gastrointestinal: Negative for abdominal pain, constipation, diarrhea, nausea and vomiting.   Genitourinary: Negative for difficulty urinating and menstrual problem.   Musculoskeletal: Positive  for arthralgias. Negative for myalgias.   Neurological: Positive for headaches. Negative for speech difficulty and numbness.   Psychiatric/Behavioral: Negative for dysphoric mood and sleep disturbance. The patient is not nervous/anxious.        Objective:      Vitals:    06/13/19 1348   BP: 107/70   Pulse: 76     Physical Exam   Constitutional: She is oriented to person, place, and time. She appears well-developed and well-nourished.   HENT:   Head: Normocephalic and atraumatic.   Eyes: Pupils are equal, round, and reactive to light. Conjunctivae and EOM are normal.   Neck: Normal range of motion. Neck supple. No thyromegaly present.   Cardiovascular: Normal rate, regular rhythm, normal heart sounds and intact distal pulses.   Pulmonary/Chest: Effort normal and breath sounds normal.   Abdominal: Soft. Bowel sounds are normal.   Musculoskeletal: Normal range of motion. She exhibits no edema.   Lymphadenopathy:     She has no cervical adenopathy.   Neurological: She is alert and oriented to person, place, and time.   Skin: Skin is warm and dry. Capillary refill takes less than 2 seconds.   Psychiatric: She has a normal mood and affect. Her behavior is normal. Judgment and thought content normal.   Vitals reviewed.      Assessment:       1. Encounter for well adult exam with abnormal findings        Plan:       Encounter for well adult exam with abnormal findings  -     CBC auto differential; Future; Expected date: 06/13/2019  -     Comprehensive metabolic panel; Future; Expected date: 06/13/2019  -     Hemoglobin A1c; Future; Expected date: 06/13/2019  -     Vitamin B12; Future; Expected date: 06/13/2019  -     Folate; Future; Expected date: 06/13/2019  -     Lipid panel; Future; Expected date: 06/13/2019  -     Vitamin D; Future; Expected date: 06/13/2019  -     Patient will return to clinic for further evaluation of her pain and will try OTC allergy medications for her headache.       Follow up in about 1 month  (around 7/11/2019) for Arthritis Evaluation.

## 2019-06-17 NOTE — PROGRESS NOTES
I have reviewed the notes, assessments, and/or procedures performed, I concur with her/his documentation of Kenya Linares.

## 2019-07-16 ENCOUNTER — OFFICE VISIT (OUTPATIENT)
Dept: FAMILY MEDICINE | Facility: HOSPITAL | Age: 39
End: 2019-07-16
Attending: FAMILY MEDICINE
Payer: MEDICAID

## 2019-07-16 VITALS
WEIGHT: 143.94 LBS | DIASTOLIC BLOOD PRESSURE: 74 MMHG | SYSTOLIC BLOOD PRESSURE: 106 MMHG | BODY MASS INDEX: 24.57 KG/M2 | HEIGHT: 64 IN | HEART RATE: 82 BPM

## 2019-07-16 DIAGNOSIS — E78.5 HYPERLIPIDEMIA, UNSPECIFIED HYPERLIPIDEMIA TYPE: ICD-10-CM

## 2019-07-16 DIAGNOSIS — M19.90 ARTHRITIS: Primary | ICD-10-CM

## 2019-07-16 PROCEDURE — 99213 OFFICE O/P EST LOW 20 MIN: CPT | Performed by: STUDENT IN AN ORGANIZED HEALTH CARE EDUCATION/TRAINING PROGRAM

## 2019-07-16 RX ORDER — ATORVASTATIN CALCIUM 10 MG/1
10 TABLET, FILM COATED ORAL DAILY
Qty: 30 TABLET | Refills: 3 | Status: SHIPPED | OUTPATIENT
Start: 2019-07-16 | End: 2019-11-14

## 2019-07-16 RX ORDER — CELECOXIB 50 MG/1
50 CAPSULE ORAL NIGHTLY
Qty: 30 CAPSULE | Refills: 2 | Status: SHIPPED | OUTPATIENT
Start: 2019-07-16 | End: 2019-08-31 | Stop reason: ALTCHOICE

## 2019-07-16 NOTE — PROGRESS NOTES
"Subjective:       Patient ID: Kenya Linares is a 38 y.o. female.    Chief Complaint: Generalized Body Aches    HPI Patient is a 37yo female with PMHx of Leg Pain and Overactive Bladder who presents to clinic for evaluation of her leg pain and her lab results. She will likely need to be started on a statin at this visit. Discussed the lipid results with the patient and she would like to start a statin today. She has tried diet and exercise for the past year and her cholesterol is still as elevated as 1 year ago.     Leg Pain:   From her previous visit in June 2019: The bone pain started at around age 21. At the time of onset she saw a physician in [place] but it was attributed to growing pains. She was unable to describe the pain but states that it is episodic, about 2 times a week, and is worse at night and during cold weather. The last time she had the pain was last night. The location is erratic and can occur in any of her extremities. She endorses associated numbness and tingling in her fingers and toes during the pain episodes and chills.     Patient states today she also has intermittent swelling of her hands and feet. She also has a feeling of "fever in her legs," at times. She has tried meloxicam and flexeril in the past, but the flexeril made her muscles feel tired and heavy. She has an established left heel spur. She has tried exercising, but she feels more tired.     Discussed the different causes of her leg pain and work-up, including lab tests.     Review of Systems   Constitutional: Negative for activity change, appetite change, fatigue and fever.   HENT: Negative for hearing loss and trouble swallowing.    Eyes: Negative for visual disturbance.   Respiratory: Negative for cough and shortness of breath.    Cardiovascular: Negative for chest pain and leg swelling.   Gastrointestinal: Negative for abdominal pain, constipation, diarrhea, nausea and vomiting.   Genitourinary: Negative for difficulty urinating " and menstrual problem.   Musculoskeletal: Positive for arthralgias and myalgias.   Neurological: Negative for speech difficulty, numbness and headaches.   Psychiatric/Behavioral: Negative for dysphoric mood and sleep disturbance. The patient is not nervous/anxious.        Objective:      Vitals:    07/16/19 1523   BP: 106/74   Pulse: 82     Physical Exam   Constitutional: She is oriented to person, place, and time. She appears well-developed and well-nourished.   HENT:   Head: Normocephalic and atraumatic.   Eyes: Conjunctivae and EOM are normal.   Cardiovascular: Normal rate, regular rhythm, normal heart sounds and intact distal pulses.   Pulmonary/Chest: Effort normal and breath sounds normal.   Abdominal: Soft. Bowel sounds are normal.   Musculoskeletal: Normal range of motion. She exhibits no edema, tenderness or deformity.   Neurological: She is alert and oriented to person, place, and time.   Skin: Skin is warm and dry. Capillary refill takes less than 2 seconds.   Psychiatric: She has a normal mood and affect. Her behavior is normal. Judgment and thought content normal.   Vitals reviewed.      Assessment:       1. Arthritis    2. Hyperlipidemia, unspecified hyperlipidemia type        Plan:       Arthritis  -     Sedimentation rate; Future; Expected date: 07/16/2019  -     C-reactive protein; Future; Expected date: 07/16/2019  -     Rheumatoid factor; Future; Expected date: 07/16/2019  -     CYCLIC CITRUL PEPTIDE ANTIBODY, IGG; Future; Expected date: 07/16/2019  -     KRISTEN; Future; Expected date: 07/16/2019        -     celecoxib (CELEBREX) 50 MG capsule; Take 1 capsule (50 mg total) by mouth every evening.  Dispense: 30 capsule; Refill: 2        -     Patient will try the celebrex nightly to see if it helps her pain.     Hyperlipidemia, unspecified hyperlipidemia type  -     atorvastatin (LIPITOR) 10 MG tablet; Take 1 tablet (10 mg total) by mouth once daily.  Dispense: 30 tablet; Refill: 3    Follow up in  about 1 month (around 8/13/2019).

## 2019-07-18 NOTE — PROGRESS NOTES
I reviewed the note and discussed with Dr. Beltrán.  Agree with lipitor. She has concerns about pains and some further medical eval is a good plan.

## 2019-07-19 ENCOUNTER — LAB VISIT (OUTPATIENT)
Dept: LAB | Facility: HOSPITAL | Age: 39
End: 2019-07-19
Attending: STUDENT IN AN ORGANIZED HEALTH CARE EDUCATION/TRAINING PROGRAM
Payer: MEDICAID

## 2019-07-19 DIAGNOSIS — M19.90 ARTHRITIS: ICD-10-CM

## 2019-07-19 LAB
CCP AB SER IA-ACNC: <0.5 U/ML
CRP SERPL-MCNC: 1.4 MG/L (ref 0–8.2)
ERYTHROCYTE [SEDIMENTATION RATE] IN BLOOD BY WESTERGREN METHOD: 18 MM/HR (ref 0–20)

## 2019-07-19 PROCEDURE — 86431 RHEUMATOID FACTOR QUANT: CPT

## 2019-07-19 PROCEDURE — 85652 RBC SED RATE AUTOMATED: CPT

## 2019-07-19 PROCEDURE — 86038 ANTINUCLEAR ANTIBODIES: CPT

## 2019-07-19 PROCEDURE — 86140 C-REACTIVE PROTEIN: CPT

## 2019-07-19 PROCEDURE — 86200 CCP ANTIBODY: CPT

## 2019-07-19 PROCEDURE — 36415 COLL VENOUS BLD VENIPUNCTURE: CPT

## 2019-07-22 LAB
ANA SER QL IF: NORMAL
RHEUMATOID FACT SERPL-ACNC: <10 IU/ML (ref 0–15)

## 2019-08-30 ENCOUNTER — OFFICE VISIT (OUTPATIENT)
Dept: FAMILY MEDICINE | Facility: HOSPITAL | Age: 39
End: 2019-08-30
Attending: FAMILY MEDICINE
Payer: MEDICAID

## 2019-08-30 VITALS
HEART RATE: 69 BPM | BODY MASS INDEX: 24.46 KG/M2 | WEIGHT: 143.31 LBS | DIASTOLIC BLOOD PRESSURE: 69 MMHG | HEIGHT: 64 IN | SYSTOLIC BLOOD PRESSURE: 105 MMHG

## 2019-08-30 DIAGNOSIS — R52 GENERALIZED BODY ACHES: ICD-10-CM

## 2019-08-30 DIAGNOSIS — E78.5 HYPERLIPIDEMIA, UNSPECIFIED HYPERLIPIDEMIA TYPE: ICD-10-CM

## 2019-08-30 PROCEDURE — 99213 OFFICE O/P EST LOW 20 MIN: CPT | Performed by: STUDENT IN AN ORGANIZED HEALTH CARE EDUCATION/TRAINING PROGRAM

## 2019-08-30 RX ORDER — AMITRIPTYLINE HYDROCHLORIDE 10 MG/1
10 TABLET, FILM COATED ORAL NIGHTLY
Qty: 30 TABLET | Refills: 3 | Status: SHIPPED | OUTPATIENT
Start: 2019-08-30 | End: 2019-11-06

## 2019-08-30 NOTE — PROGRESS NOTES
"Subjective                                                                                                                                                                           Chief Complaint: Generalized Body Aches    History of Present Illness    Kenya Linares is a 38 y.o. female who  has a past medical history of PONV (postoperative nausea and vomiting). The patient presents to clinic for Arthritis management. At the patient's last visit, she was started on celebrex and a statin for HLD after failing lifestyle changes. She reports adherence and no adverse effects (including worsening of her muscle aches) with atorvostatin, however reports that her insurance does not cover celebrex, and so she did not take it. She was evaluated with KRISTEN, CCP, RA, CSP, and ESR and found to have no inflammation present. She denies changes to the character, frequency, or severity of the pain. She describes intermittent, severe "bone pain" occurring 2-3 times per week that is worse at night, and affects all four extremities, but never concurrently. She characterizes the pain as sometimes burning, deep pain that makes her want to move her extremities. It is associated with muscle fatigue/weakness in upper extremity episodes. Ibuprofen sometimes provides relief.     Patient denies increased stressors in her life, but does endorse decreased physical activity over the past few years. She doesn't know if there is a connection to her decreased physical activity and increased pain.      Healthcare Maintenance:   Immunizations:  There is no immunization history on file for this patient.  TDap is not up to date, Influenza is up to date.   Screening:  PAP: is up to date.      Review of Systems   Constitutional: Positive for diaphoresis (reports intermittent night time sweats). Negative for activity change, appetite change, chills, fever and unexpected weight change.   HENT: Negative for congestion, postnasal drip and rhinorrhea.    Eyes: " "Negative for visual disturbance.   Respiratory: Negative for cough, chest tightness and shortness of breath.    Cardiovascular: Negative for chest pain and leg swelling.   Gastrointestinal: Negative for constipation, diarrhea and vomiting.   Endocrine: Positive for cold intolerance and heat intolerance (describes changes in temperature can cause the muscle pain).   Genitourinary: Negative for menstrual problem and pelvic pain.        Describes a menstrual "prodrome" of irritability and thigh fatigue distinct from her body ache pains 1 day prior to menstruation.   Musculoskeletal: Positive for myalgias. Negative for arthralgias, back pain, gait problem, joint swelling, neck pain and neck stiffness.   Allergic/Immunologic: Negative for environmental allergies and food allergies.   Neurological: Positive for weakness (with UE aching episodes) and headaches (associated with menstural prodrome). Negative for light-headedness and numbness.   Psychiatric/Behavioral: Positive for sleep disturbance (due to pain episodes). Negative for dysphoric mood. The patient is not nervous/anxious.         Past Medical History:   Diagnosis Date    PONV (postoperative nausea and vomiting)        Past Surgical History:   Procedure Laterality Date     SECTION      [id number] Bilateral 2016    Per[facility]iz [facility]    TUBAL LIGATION         Family History   Problem Relation Age of Onset    Hypertension Mother     Cervical cancer Maternal Aunt     Cancer Paternal Aunt     Breast cancer Maternal Grandmother        Review of patient's allergies indicates:   Allergen Reactions    Roxicet [oxycodone-acetaminophen] Itching and Edema     Starting taking Roxicet Friday.  Starting experieincing facial swelling and general itching Saturday.         Current Outpatient Medications:     atorvastatin (LIPITOR) 10 MG tablet, Take 1 tablet (10 mg total) by mouth once daily., Disp: 30 tablet, " "Rfl: 3    amitriptyline (ELAVIL) 10 MG tablet, Take 1 tablet (10 mg total) by mouth every evening., Disp: 30 tablet, Rfl: 3     Social History     Tobacco Use    Smoking status: Never Smoker   Substance Use Topics    Alcohol use: No    Drug use: Not on file        Objective                                                                                                                                                                             Vitals:    08/30/19 0901   BP: 105/69   Pulse: 69   Weight: 65 kg (143 lb 4.8 oz)   Height: 5' 4" (1.626 m)      Body mass index is 24.6 kg/m².    Physical Exam   Constitutional: She is oriented to person, place, and time. She appears well-developed and well-nourished. No distress.   HENT:   Head: Normocephalic and atraumatic.   Nose: Nose normal.   Eyes: Conjunctivae and EOM are normal. Right eye exhibits no discharge. Left eye exhibits no discharge. No scleral icterus.   Neck: Normal range of motion. Neck supple. No tracheal deviation present.   Cardiovascular: Normal rate, regular rhythm and normal heart sounds. Exam reveals no gallop and no friction rub.   No murmur heard.  Pulmonary/Chest: Effort normal and breath sounds normal. No stridor. No respiratory distress. She has no wheezes. She has no rales. She exhibits no tenderness.   Abdominal: Soft. Bowel sounds are normal. She exhibits no distension. There is no tenderness.   Musculoskeletal: Normal range of motion. She exhibits no edema or deformity.   Neurological: She is alert and oriented to person, place, and time. No cranial nerve deficit or sensory deficit. She exhibits normal muscle tone. Coordination normal.   Skin: Skin is warm and dry. Capillary refill takes less than 2 seconds. She is not diaphoretic.   Psychiatric: She has a normal mood and affect. Her behavior is normal. Judgment and thought content normal.          Assessment/Plan                                                                            "                                                                                     Kenya Linares is a 38 y.o. female who presents to clinic with:    1. Generalized body aches    2. Hyperlipidemia, unspecified hyperlipidemia type         Kenya was seen today for follow-up.    Diagnoses and all orders for this visit:    Generalized body aches        -     amitriptyline (ELAVIL) 10 MG tablet; Take 1 tablet (10 mg total) by mouth every evening.        -     Patient continues to have intermittent, nonspecific body aches and pains. Patient was unable to fill the celebrex after last visit. Given pain is predominantly at night, will trial elavil. Described side effects and proper use of medication. Discussed possible etiologies with patient, including fibromyalglia, and reviewed RA work-up results with patient, which were negative. Also discussed increasing exercise. May need further evaluation of lifestyle stressors at next visit.     Hyperlipidemia, unspecified hyperlipidemia type        -     Patient tolerating statin, continue current management.             Medication List with Changes/Refills   New Medications    AMITRIPTYLINE (ELAVIL) 10 MG TABLET    Take 1 tablet (10 mg total) by mouth every evening.   Current Medications    ATORVASTATIN (LIPITOR) 10 MG TABLET    Take 1 tablet (10 mg total) by mouth once daily.   Discontinued Medications    CELECOXIB (CELEBREX) 50 MG CAPSULE    Take 1 capsule (50 mg total) by mouth every evening.       Patient counseled about the importance of healthy dietary habits as well as routine physical activity and exercise for better health outcomes.    The patient's diagnosis and medications were discussed. Proper use of medications was dicussed. I also discussed the importance of close follow up to discuss labs, change or modify her medications if needed, monitor side effects, and further evaluation of her medical problems.     Follow up in about 4 weeks (around 9/27/2019). for further  workup and reassessment if labs and tests obtained are stable or sooner as needed    Understanding expressed after counseling regarding diagnosis and recommendations.

## 2019-08-31 PROBLEM — E78.5 HLD (HYPERLIPIDEMIA): Status: ACTIVE | Noted: 2019-08-31

## 2019-08-31 PROBLEM — R52 GENERALIZED BODY ACHES: Status: ACTIVE | Noted: 2019-08-31

## 2019-11-02 ENCOUNTER — PATIENT MESSAGE (OUTPATIENT)
Dept: FAMILY MEDICINE | Facility: HOSPITAL | Age: 39
End: 2019-11-02

## 2019-11-06 ENCOUNTER — OFFICE VISIT (OUTPATIENT)
Dept: FAMILY MEDICINE | Facility: HOSPITAL | Age: 39
End: 2019-11-06
Attending: FAMILY MEDICINE
Payer: MEDICAID

## 2019-11-06 VITALS
HEART RATE: 66 BPM | WEIGHT: 144.81 LBS | HEIGHT: 64 IN | SYSTOLIC BLOOD PRESSURE: 109 MMHG | DIASTOLIC BLOOD PRESSURE: 70 MMHG | BODY MASS INDEX: 24.72 KG/M2

## 2019-11-06 DIAGNOSIS — R52 GENERALIZED BODY ACHES: Primary | ICD-10-CM

## 2019-11-06 PROCEDURE — 99213 OFFICE O/P EST LOW 20 MIN: CPT | Performed by: STUDENT IN AN ORGANIZED HEALTH CARE EDUCATION/TRAINING PROGRAM

## 2019-11-06 NOTE — PROGRESS NOTES
Subjective:       Patient ID: Kenya Linares is a 39 y.o. female.    Chief Complaint: Back Pain    Ms. Linares is a 38yo F with a PMH of HLD, statin intolerance, urinary incontinence with bladder spasm, chronic constipation, Caesarian section with tubal ligation, and generalized body aches, who presents to clinic today with 1 week of back pain. Her pain is in the bilateral lumbar region, worse on the left side, and somewhat relieved with ibuprofen. However, it gets worse as the day goes on, radiates around the body to the L epigastric area, and is associated with other symptoms in the afternoon. At the end of her work day, she also notices bloating and swelling of her abdomen and urinary retention. She reports taking 3-4 fiber gummies every day to address her chronic constipation, which has helped her to have 1 BM per day, but has also caused some flatulence and gas pains. She also reports drinking extra water throughout the week that her current symptoms have occurred. She denies prior trauma to the back, dysuria, polyuria, N/V, constipation, diarrhea, fever, change in urine color, fever, and vaginal discharge or discomfort.    Patient also states that the elavil actually has increased her energy levels at night and has not helped her pain very much. She stopped taking it recently. She has also stopped taking her statin in order to see if her pain decreases when off the medication.    Review of Systems   Constitutional: Negative for activity change, appetite change, fatigue and fever.   HENT: Negative for hearing loss and trouble swallowing.    Eyes: Negative for visual disturbance.   Respiratory: Negative for cough and shortness of breath.    Cardiovascular: Negative for chest pain and leg swelling.   Gastrointestinal: Positive for abdominal distention and abdominal pain. Negative for constipation, diarrhea, nausea and vomiting.   Endocrine: Negative for polyuria.   Genitourinary: Positive for decreased urine  volume, difficulty urinating and flank pain. Negative for dysuria, frequency, hematuria, menstrual problem, pelvic pain, urgency and vaginal discharge.   Musculoskeletal: Positive for back pain. Negative for arthralgias, gait problem and myalgias.   Neurological: Negative for dizziness, speech difficulty, weakness, numbness and headaches.   Psychiatric/Behavioral: Negative for dysphoric mood and sleep disturbance. The patient is not nervous/anxious.        Objective:      Vitals:    11/06/19 0959   BP: 109/70   Pulse: 66     Physical Exam   Constitutional: She is oriented to person, place, and time. She appears well-developed and well-nourished.   HENT:   Head: Normocephalic and atraumatic.   Eyes: Conjunctivae and EOM are normal.   Cardiovascular: Normal rate, regular rhythm, normal heart sounds and intact distal pulses.   Pulmonary/Chest: Effort normal and breath sounds normal.   Abdominal: Soft. Bowel sounds are normal.   Musculoskeletal: Normal range of motion. She exhibits no edema.   No CVA tenderness. No tenderness to palpation abdomen or back.   Neurological: She is alert and oriented to person, place, and time.   Skin: Skin is warm and dry. Capillary refill takes less than 2 seconds.   Psychiatric: She has a normal mood and affect. Her behavior is normal. Judgment and thought content normal.   Vitals reviewed.      Assessment:       1. Generalized body aches        Plan:       Generalized body aches  -     Comprehensive metabolic panel; Future; Expected date: 11/06/2019  -     CBC auto differential; Future; Expected date: 11/06/2019  -     Lipid panel; Future; Expected date: 11/06/2019  -     acetaminophen (TYLENOL) suppository; Place 1 suppository (325 mg total) rectally every 6 (six) hours as needed for Pain.; Refill: 0. Also discussed with patient about decreasing her fiber gummies as this may be contributing to her pain. Patient will no longer take a statin or her amitriptyline.  Basic labs ordered to  check liver and kidney function.     Follow up in about 1 week (around 11/13/2019).

## 2019-11-14 ENCOUNTER — OFFICE VISIT (OUTPATIENT)
Dept: FAMILY MEDICINE | Facility: HOSPITAL | Age: 39
End: 2019-11-14
Attending: FAMILY MEDICINE
Payer: MEDICAID

## 2019-11-14 VITALS
HEIGHT: 64 IN | WEIGHT: 146.19 LBS | BODY MASS INDEX: 24.96 KG/M2 | HEART RATE: 77 BPM | DIASTOLIC BLOOD PRESSURE: 73 MMHG | SYSTOLIC BLOOD PRESSURE: 104 MMHG

## 2019-11-14 DIAGNOSIS — M79.7 FIBROMYALGIA: Primary | ICD-10-CM

## 2019-11-14 DIAGNOSIS — N76.0 ACUTE VAGINITIS: ICD-10-CM

## 2019-11-14 PROCEDURE — 99213 OFFICE O/P EST LOW 20 MIN: CPT | Performed by: STUDENT IN AN ORGANIZED HEALTH CARE EDUCATION/TRAINING PROGRAM

## 2019-11-14 RX ORDER — IBUPROFEN 600 MG/1
600 TABLET ORAL EVERY 8 HOURS PRN
Qty: 45 TABLET | Refills: 1 | Status: SHIPPED | OUTPATIENT
Start: 2019-11-14 | End: 2020-01-09 | Stop reason: SDUPTHER

## 2019-11-14 NOTE — PROGRESS NOTES
"Subjective:       Patient ID: Kenya Linares is a 39 y.o. female.    Chief Complaint: Back Pain    HPI Patient is a 40yo female with PMHx of HLD who presents to clinic to follow-up back pain. Patient states she is still having this pain, although it gets mildly better with NSAIDs. She states it is a continued "deep" pain. She also complains of suprapubic pain and a white vaginal discharge. She states she sometimes gets bloated in the afternoon, but that decreasing her fiber gummies from 4 a day to 2 a day has helped slightly. Her constipation remains, but she is becoming more regular with the gummies.     Gyn Hx: tubal and . Her periods have not changed in length or nature.   Review of Systems   Constitutional: Negative for activity change, appetite change, fatigue and fever.   HENT: Negative for hearing loss and trouble swallowing.    Eyes: Negative for visual disturbance.   Respiratory: Negative for cough and shortness of breath.    Cardiovascular: Negative for chest pain and leg swelling.   Gastrointestinal: Positive for abdominal pain and constipation. Negative for diarrhea, nausea and vomiting.   Genitourinary: Positive for vaginal discharge. Negative for difficulty urinating, frequency, menstrual problem, urgency and vaginal pain.   Musculoskeletal: Positive for back pain. Negative for arthralgias and myalgias.   Neurological: Negative for speech difficulty, numbness and headaches.   Psychiatric/Behavioral: Negative for dysphoric mood and sleep disturbance. The patient is not nervous/anxious.        Objective:      Vitals:    19 0851   BP: 104/73   Pulse: 77     Physical Exam   Constitutional: She is oriented to person, place, and time. She appears well-developed and well-nourished.   HENT:   Head: Normocephalic and atraumatic.   Eyes: Conjunctivae and EOM are normal.   Cardiovascular: Normal rate, regular rhythm, normal heart sounds and intact distal pulses.   Pulmonary/Chest: Effort normal and " breath sounds normal.   Abdominal: Soft. Bowel sounds are normal.   Genitourinary: Uterus normal. Vaginal discharge (white, mucoid discharge present) found.   Genitourinary Comments: Stool felt in intestines while performing bimanual exam. N CMT, no adnexal tenderness, no suprapubic tenderness.    Musculoskeletal: Normal range of motion. She exhibits no edema or tenderness (No CVA tenderness. ).   Neurological: She is alert and oriented to person, place, and time.   Skin: Skin is warm and dry. Capillary refill takes less than 2 seconds.   Psychiatric: She has a normal mood and affect. Her behavior is normal. Judgment and thought content normal.   Vitals reviewed.      Assessment:       1. Fibromyalgia    2. Acute vaginitis        Plan:       Fibromyalgia  -     US Retroperitoneal Complete (Kidney and; Future; Expected date: 11/14/2019  -     ibuprofen (ADVIL,MOTRIN) 600 MG tablet; Take 1 tablet (600 mg total) by mouth every 8 (eight) hours as needed for Pain.  Dispense: 45 tablet; Refill: 1  -      Discussed etiologies of pain: fibromyalgia vs. Adhesions vs. Constipation vs. Renal pathology. Will obtain renal US to complete evaluation for renal pathology. Labs reviewed with patient.     Acute vaginitis        -     Swabs taken for G/C and affirm. Wet prep performed, no clue cells, yeast, or flagellates seen.     Follow up in about 4 weeks (around 12/12/2019).

## 2019-11-21 ENCOUNTER — HOSPITAL ENCOUNTER (OUTPATIENT)
Dept: RADIOLOGY | Facility: HOSPITAL | Age: 39
Discharge: HOME | End: 2019-11-21
Attending: STUDENT IN AN ORGANIZED HEALTH CARE EDUCATION/TRAINING PROGRAM
Payer: MEDICAID

## 2019-11-21 DIAGNOSIS — M79.7 FIBROMYALGIA: ICD-10-CM

## 2019-11-21 PROCEDURE — 76770 US EXAM ABDO BACK WALL COMP: CPT | Mod: TC

## 2019-11-21 PROCEDURE — 76770 US EXAM ABDO BACK WALL COMP: CPT | Mod: 26,,, | Performed by: RADIOLOGY

## 2019-11-21 PROCEDURE — 76770 US RETROPERITONEAL COMPLETE: ICD-10-PCS | Mod: 26,,, | Performed by: RADIOLOGY

## 2019-12-31 ENCOUNTER — TELEPHONE (OUTPATIENT)
Dept: OBSTETRICS AND GYNECOLOGY | Facility: CLINIC | Age: 39
End: 2019-12-31

## 2019-12-31 ENCOUNTER — PATIENT MESSAGE (OUTPATIENT)
Dept: OBSTETRICS AND GYNECOLOGY | Facility: CLINIC | Age: 39
End: 2019-12-31

## 2019-12-31 DIAGNOSIS — N76.0 ACUTE VAGINITIS: Primary | ICD-10-CM

## 2019-12-31 RX ORDER — METRONIDAZOLE 7.5 MG/G
GEL VAGINAL
Qty: 70 G | Refills: 1 | Status: SHIPPED | OUTPATIENT
Start: 2019-12-31 | End: 2022-03-08

## 2019-12-31 RX ORDER — CLINDAMYCIN HYDROCHLORIDE 300 MG/1
300 CAPSULE ORAL EVERY 8 HOURS
Qty: 30 CAPSULE | Refills: 1 | Status: SHIPPED | OUTPATIENT
Start: 2019-12-31 | End: 2020-01-10

## 2019-12-31 NOTE — TELEPHONE ENCOUNTER
Gokul Ruiz . Me pude alayna rené receta de clindamicyn. Si puede me le pone refill por favor. Usted me puso cong medicamento por un año dajuan ya no tengo más. He estado usando el metronidazole en crema.   Agradecida Kenya Linares.

## 2020-01-09 DIAGNOSIS — M79.7 FIBROMYALGIA: ICD-10-CM

## 2020-01-09 RX ORDER — IBUPROFEN 600 MG/1
600 TABLET ORAL EVERY 8 HOURS PRN
Qty: 45 TABLET | Refills: 1 | Status: SHIPPED | OUTPATIENT
Start: 2020-01-09 | End: 2023-03-28

## 2020-01-14 ENCOUNTER — OFFICE VISIT (OUTPATIENT)
Dept: OBSTETRICS AND GYNECOLOGY | Facility: CLINIC | Age: 40
End: 2020-01-14
Payer: MEDICAID

## 2020-01-14 ENCOUNTER — LAB VISIT (OUTPATIENT)
Dept: LAB | Facility: HOSPITAL | Age: 40
End: 2020-01-14
Attending: OBSTETRICS & GYNECOLOGY
Payer: MEDICAID

## 2020-01-14 VITALS
SYSTOLIC BLOOD PRESSURE: 110 MMHG | BODY MASS INDEX: 24.62 KG/M2 | WEIGHT: 144.19 LBS | DIASTOLIC BLOOD PRESSURE: 72 MMHG | HEIGHT: 64 IN

## 2020-01-14 DIAGNOSIS — N64.3 GALACTORRHEA: ICD-10-CM

## 2020-01-14 DIAGNOSIS — Z12.4 CERVICAL CANCER SCREENING: ICD-10-CM

## 2020-01-14 DIAGNOSIS — N63.20 LEFT BREAST MASS: Primary | ICD-10-CM

## 2020-01-14 DIAGNOSIS — B96.89 BV (BACTERIAL VAGINOSIS): ICD-10-CM

## 2020-01-14 DIAGNOSIS — N76.0 BV (BACTERIAL VAGINOSIS): ICD-10-CM

## 2020-01-14 LAB — PROLACTIN SERPL IA-MCNC: 9.8 NG/ML (ref 5.2–26.5)

## 2020-01-14 PROCEDURE — 88175 CYTOPATH C/V AUTO FLUID REDO: CPT

## 2020-01-14 PROCEDURE — 99213 OFFICE O/P EST LOW 20 MIN: CPT | Mod: PBBFAC,PO | Performed by: OBSTETRICS & GYNECOLOGY

## 2020-01-14 PROCEDURE — 99999 PR PBB SHADOW E&M-EST. PATIENT-LVL III: ICD-10-PCS | Mod: PBBFAC,,, | Performed by: OBSTETRICS & GYNECOLOGY

## 2020-01-14 PROCEDURE — 84146 ASSAY OF PROLACTIN: CPT

## 2020-01-14 PROCEDURE — 87624 HPV HI-RISK TYP POOLED RSLT: CPT

## 2020-01-14 PROCEDURE — 99213 OFFICE O/P EST LOW 20 MIN: CPT | Mod: S$PBB,,, | Performed by: OBSTETRICS & GYNECOLOGY

## 2020-01-14 PROCEDURE — 87661 TRICHOMONAS VAGINALIS AMPLIF: CPT

## 2020-01-14 PROCEDURE — 87481 CANDIDA DNA AMP PROBE: CPT | Mod: 59

## 2020-01-14 PROCEDURE — 99213 PR OFFICE/OUTPT VISIT, EST, LEVL III, 20-29 MIN: ICD-10-PCS | Mod: S$PBB,,, | Performed by: OBSTETRICS & GYNECOLOGY

## 2020-01-14 PROCEDURE — 99999 PR PBB SHADOW E&M-EST. PATIENT-LVL III: CPT | Mod: PBBFAC,,, | Performed by: OBSTETRICS & GYNECOLOGY

## 2020-01-14 PROCEDURE — 36415 COLL VENOUS BLD VENIPUNCTURE: CPT

## 2020-01-14 RX ORDER — METRONIDAZOLE 500 MG/1
500 TABLET ORAL
Qty: 14 TABLET | Refills: 0 | Status: SHIPPED | OUTPATIENT
Start: 2020-01-14 | End: 2020-01-21

## 2020-01-14 RX ORDER — METRONIDAZOLE 7.5 MG/G
1 GEL VAGINAL
Qty: 70 G | Refills: 10 | Status: SHIPPED | OUTPATIENT
Start: 2020-01-16 | End: 2022-03-08

## 2020-01-14 NOTE — PROGRESS NOTES
37 yo female who presents to discuss chronic BV.  Reports that vaginal discharge with odor/irriation occurs after every menstrual cycle.  Reports BV after sexual activity.  Reports that she takes metrogel and this helps. But, she continues to have BV despite using the medication.  The patient is very upset about this situation.  She also complains of nipple discharge from her left breast.  On breast exam, there is discharge from left breast with palpation.  In the vagina, there is white discharge.  Affirm collected.    Patient and I have discussed metrogel twice weekly x 16 weeks.  We have also discussed boric acid.  She will try metrogel twice weekly  For 16 weeks for now.    F/u in 2 months     keyon haider MD

## 2020-01-15 LAB
BACTERIAL VAGINOSIS DNA: NEGATIVE
CANDIDA GLABRATA DNA: NEGATIVE
CANDIDA KRUSEI DNA: NEGATIVE
CANDIDA RRNA VAG QL PROBE: NEGATIVE
T VAGINALIS RRNA GENITAL QL PROBE: NEGATIVE

## 2020-01-22 ENCOUNTER — HOSPITAL ENCOUNTER (OUTPATIENT)
Dept: RADIOLOGY | Facility: HOSPITAL | Age: 40
Discharge: HOME | End: 2020-01-22
Attending: OBSTETRICS & GYNECOLOGY
Payer: MEDICAID

## 2020-01-22 ENCOUNTER — TELEPHONE (OUTPATIENT)
Dept: SURGERY | Facility: CLINIC | Age: 40
End: 2020-01-22

## 2020-01-22 DIAGNOSIS — N63.20 LEFT BREAST MASS: ICD-10-CM

## 2020-01-22 LAB
HPV HR 12 DNA SPEC QL NAA+PROBE: NEGATIVE
HPV16 AG SPEC QL: NEGATIVE
HPV18 DNA SPEC QL NAA+PROBE: NEGATIVE

## 2020-01-22 PROCEDURE — 76642 US BREAST BILATERAL LIMITED: ICD-10-PCS | Mod: 26,50,, | Performed by: RADIOLOGY

## 2020-01-22 PROCEDURE — 77066 DX MAMMO INCL CAD BI: CPT | Mod: 26,,, | Performed by: RADIOLOGY

## 2020-01-22 PROCEDURE — 77062 MAMMO DIGITAL DIAGNOSTIC BILAT WITH TOMOSYNTHESIS_CAD: ICD-10-PCS | Mod: 26,,, | Performed by: RADIOLOGY

## 2020-01-22 PROCEDURE — 77062 BREAST TOMOSYNTHESIS BI: CPT | Mod: TC

## 2020-01-22 PROCEDURE — 77062 BREAST TOMOSYNTHESIS BI: CPT | Mod: 26,,, | Performed by: RADIOLOGY

## 2020-01-22 PROCEDURE — 76642 ULTRASOUND BREAST LIMITED: CPT | Mod: 26,50,, | Performed by: RADIOLOGY

## 2020-01-22 PROCEDURE — 77066 MAMMO DIGITAL DIAGNOSTIC BILAT WITH TOMOSYNTHESIS_CAD: ICD-10-PCS | Mod: 26,,, | Performed by: RADIOLOGY

## 2020-01-22 PROCEDURE — 76642 ULTRASOUND BREAST LIMITED: CPT | Mod: TC,50

## 2020-01-22 NOTE — TELEPHONE ENCOUNTER
----- Message from RT Sukhwinder sent at 1/22/2020  1:53 PM CST -----  Dr. Ryan Perez is recommending a surgical consultation due to nipple discharge. Can you please call the pt to set her up, she is aware of the recommendation.  Thank you,  Loyda

## 2020-01-22 NOTE — TELEPHONE ENCOUNTER
Contacted the patient regarding the message below.  The patient is scheduled to be seen on Thursday 1/30/2020, 9:15 am with  at [facility].  The patient voiced understanding of appointment date, time, and location.  Reminder letter mailed to the patient.

## 2020-01-30 ENCOUNTER — OFFICE VISIT (OUTPATIENT)
Dept: SURGERY | Facility: CLINIC | Age: 40
End: 2020-01-30
Attending: SURGERY
Payer: MEDICAID

## 2020-01-30 VITALS
WEIGHT: 145.31 LBS | DIASTOLIC BLOOD PRESSURE: 66 MMHG | HEART RATE: 79 BPM | SYSTOLIC BLOOD PRESSURE: 105 MMHG | HEIGHT: 64 IN | BODY MASS INDEX: 24.81 KG/M2

## 2020-01-30 DIAGNOSIS — N64.52 NIPPLE DISCHARGE: Primary | ICD-10-CM

## 2020-01-30 PROCEDURE — 99203 OFFICE O/P NEW LOW 30 MIN: CPT | Mod: S$GLB,,, | Performed by: SURGERY

## 2020-01-30 PROCEDURE — 99203 PR OFFICE/OUTPT VISIT, NEW, LEVL III, 30-44 MIN: ICD-10-PCS | Mod: S$GLB,,, | Performed by: SURGERY

## 2020-01-30 NOTE — LETTER
January 30, 2020      Royce Ruiz MD  [address]  [address]  [facility] [address]           [place] [address]  [address], [address]  [facility] [address]  Phone: [phone]  Fax: [phone]          Patient: Kenya Linares   MR Number: [id number]   YOB: 1980   Date of Visit: 1/30/2020       Dear Dr. Royce Ruiz:    Thank you for referring Kenya Linares to me for evaluation. Attached you will find relevant portions of my assessment and plan of care.    If you have questions, please do not hesitate to call me. I look forward to following Kenya Linares along with you.    Sincerely,    Claire Matthews MD    Enclosure  CC:  No Recipients    If you would like to receive this communication electronically, please contact [email or web address] or [phone] to request more information on [employer] access.    For providers and/or their staff who would like to refer a patient to [facility], please contact us through our one-stop-shop provider referral line, [facility], at [phone].    If you feel you have received this communication in error or would no longer like to receive these types of communications, please e-mail [email or web address]

## 2020-01-30 NOTE — PROGRESS NOTES
History and Physical  [facility]  Department of Surgery    REFERRING PROVIDER: Royce Ruiz Md  [address]  [address]  [place] [address]    CHIEF COMPLAINT: left nipple discharge    Subjective:      Kenya Linares is a 39 y.o. premenopausal female referred for evaluation of discharge of the bilateral breast.  Discharge is noted to be dark green with expression keshawn, associated with occasional pain.  Discharge started 3 years ago.  Left>right.  Diagnostics studies including  Mammogram and/or ultrasound were performed on 2020.  Patient was given BIRADS 0. No abnormal findings were noted on imaging.    Patient does routinely do self breast exams.  Patient has noted a change on breast exam.  Patient denies to previous breast biopsy. Patient denies a personal history of breast cancer.    GYN History:  Age of menarche was 14. Age of menopause was n/a.Patient denies hormonal therapy. Patient is . Age of first live birth was 26. Patient did breast feed x 5 months.      FAMILY History:  MGM breast ca 80s  Maternal aunt ovarian ca 35  3 maternal great aunts cancer--unknown.      Past Medical History:   Diagnosis Date    PONV (postoperative nausea and vomiting)      Past Surgical History:   Procedure Laterality Date     SECTION      TUBAL LIGATION       Current Outpatient Medications on File Prior to Visit   Medication Sig Dispense Refill    acetaminophen (TYLENOL) suppository Place 1 suppository (325 mg total) rectally every 6 (six) hours as needed for Pain.  0    ibuprofen (ADVIL,MOTRIN) 600 MG tablet Take 1 tablet (600 mg total) by mouth every 8 (eight) hours as needed for Pain. 45 tablet 1    metroNIDAZOLE (METROGEL) 0.75 % vaginal gel Apply 5gm per vagina at nighttime for 5 days 70 g 1    metroNIDAZOLE (METROGEL) 0.75 % vaginal gel Place 1 applicator vaginally twice a week. Insert vaginally twice weekly for 16 weeks for recurrent bacterial vaginosis infection 70 g 10     No  "current facility-administered medications on file prior to visit.      Social History     Socioeconomic History    Marital status:      Spouse name: Not on file    Number of children: Not on file    Years of education: Not on file    Highest education level: Not on file   Occupational History    Not on file   Social Needs    Financial resource strain: Not on file    Food insecurity:     Worry: Not on file     Inability: Not on file    Transportation needs:     Medical: Not on file     Non-medical: Not on file   Tobacco Use    Smoking status: Never Smoker    Smokeless tobacco: Never Used   Substance and Sexual Activity    Alcohol use: No    Drug use: Never    Sexual activity: Yes     Partners: Male     Birth control/protection: See Surgical Hx   Lifestyle    Physical activity:     Days per week: Not on file     Minutes per session: Not on file    Stress: Not on file   Relationships    Social connections:     Talks on phone: Not on file     Gets together: Not on file     Attends Jehovah's witness service: Not on file     Active member of club or organization: Not on file     Attends meetings of clubs or organizations: Not on file     Relationship status: Not on file   Other Topics Concern    Not on file   Social History Narrative    Not on file     Family History   Problem Relation Age of Onset    Hypertension Mother     Cervical cancer Maternal Aunt     Ovarian cancer Maternal Aunt     Cancer Paternal Aunt     Breast cancer Maternal Grandmother        Review of Systems  Pertinent items are noted in HPI.       Objective:        /66   Pulse 79   Ht 5' 4" (1.626 m)   Wt 65.9 kg (145 lb 4.5 oz)   LMP 01/15/2020   BMI 24.94 kg/m²     General Appearance:    Alert, cooperative, no distress, appears stated age   Head:    Normocephalic, without obvious abnormality, atraumatic   Eyes:    PERRL, lids normal   Neck:   Supple, symmetrical, no adenopathy   Lungs:     respirations unlabored; no " obvious deformity   Chest Wall:    No tenderness or deformity   Heart::   Regular rate and rhythm   Abdomen:     Soft, non-tender, nondistended   Extremities:   Extremities normal, atraumatic   Skin:   Skin color, texture, turgor normal, no rashes or lesions   Lymph nodes:   No Cervical or supraclavicular adenopathy   Neuro/Psych:   Alert and oriented, good judgement   BREAST exam:  Left: no masses, skin changes. No inverted nipple.  No axillary LAD.  Multiple ducts with greenish (light and dark green) with expression.  Right: no masses, skin changes. No inverted nipple.  No axillary LAD. Multiple ducts with greenish (light and dark green) with expression.      Radiology review: Images personally reviewed by me in the clinic.        Assessment:      Kenya Linares is a 39 y.o. premenopausal female with bilateral nipple discharge.     Plan:   Reassurance given.  Explained that this physiologic discharge since bilateral, multiple ducts with expression only.    Refer to genetic for discussion given family history.

## 2020-01-31 LAB
FINAL PATHOLOGIC DIAGNOSIS: NORMAL
Lab: NORMAL

## 2020-02-04 NOTE — PROGRESS NOTES
Hereditary and High-Risk Clinic  Department of Hematology and Oncology  [facility]  [facility] [address], [place]  [address]    02/06/2020         Patient ID: Kenya Linares is a 39 y.o. female.    Chief Complaint: Genetic Evaluation      Referring Provider:  Claire Matthews MD      Subjective:      History of Present Illness (HPI):  New patient presents today for a genetic evaluation as it pertains to hereditary cancer risk.      Pedigree          Review of Systems - See HPI.  Distress Score = 3/10.  Objective:     Physical Exam   Constitutional: She appears well developed and well nourished. No distress.   Pulmonary/Chest: Effort normal.   Neurological: She is alert.   Psychiatric: She has a normal mood and affect. Her speech is normal and behavior is normal. Thought content normal.     Assessment:       1. Encounter for nonprocreative genetic counseling          Counseling:     Based on the information provided to me by patient today, patient does not clearly meet current criteria published by the National Comprehensive Cancer Network (NCCN) Guidelines for Genetic/Familial High-Risk Assessment; however, she does meet clinical recommendations for genetic testing for hereditary cancer syndromes given all of her blood-relatives living in [place] with patient's being unsure whether they can undergo genetic testing, patient's maternal aunt's diagnosis of early-onset uterine cancer, and patient's limited knowledge of her paternal family history.  There are significant limitations of interpreting a negative genetic testing result in a patient unaffected with cancer prior to testing appropriate affected relative(s).  We discussed who these relatives are and what the implications of their testing results would be for patient.    Multigene/panel/comprehensive genetic testing consists of testing multiple genes known to be related to hereditary cancer syndromes.  A key role  of tumor suppressor genes is to suppress a cancer.  When a tumor suppressor gene has a clinically significant mutation, it affects the functioning of the gene, and the individual may be more likely to develop cancer in certain organs.      Only some cancers are hereditary.  When a gene mutation is not identified, it does not completely rule out the possibility of hereditary cancers but does make them less likely.  Additionally, it is possible to see familial clustering of related cancer amongst family members, and these are sometimes caused by carcinogens and/or lifestyle factors that may be shared amongst family members.       Possible results of genetic testing include positive, negative, and variant of uncertain significance (VUS) (or an unclear result).  If positive, specific cancer risks vary depending upon the tumor suppressor gene in which there is a mutation.  In some cases, depending upon the result and the clinical history, modified management may be recommended, including measures for risk reduction and/or surveillance, though modified management is not always an option.  A VUS indicates that the amino acids within a gene are lining up in a way different from usual, but there is not presently enough data for the laboratory to make a determination as to whether the variant is benign or pathogenic; VUSs are not typically acted upon clinically.       If patient tests positive for a mutation inherited in an autosomal-dominant manner, her first-degree relatives would each have a 50% chance of having the same mutation, and other blood-relatives would also be at a lesser risk of having the same mutation.       The Genetic Information Nondiscrimination Act (MIGUELITO) prohibits most health insurance agencies and employers with 16 or more employees from discriminating against an individual based on genetic test results; however, MIGUELITO does not protect individuals with regard to other types of policies (including but not  limited to life insurance, disability, long-term care insurance,  benefits or insurance, and  Health Services benefits).     An outside laboratory, of which there are several, would perform the testing after a blood sample is collected at [facility]s laboratory.     Offered patient testing today, versus deferring testing at this time, versus declining testing altogether.  Patient elects to defer genetic testing for hereditary cancer syndromes at this time but wishes to return on 02/18/2020 for sample collection, citing that she wants to get insurance policies in place, etc. prior to sample collection.     Questions were encouraged and answered to patient's satisfaction, and she verbalized understanding of information and agreement with the plan.     Plan:       Patient elects to defer genetic testing for hereditary cancer-related syndromes at this time but wishes to return on 02/18/2020 for sample collection, citing that she wants to get insurance policies in place, etc. prior to sample collection.       A total of approximately 40 minutes was spent face-to-face with this patient, of which >50% was spent counseling the patient and/or coordinating her care.        Esteban Elizabeth, JOSÉ, APRN, FNP-BC,  Adult Oncology-Certified Nurse Practitioner (AOCNP)  Hereditary and High-Risk Clinic  Department of Hematology and Oncology  [facility], 3rd Floor  [facility] [address], [place]  [address]  Office phone   [phone]    Date:  02/06/2020

## 2020-02-06 ENCOUNTER — OFFICE VISIT (OUTPATIENT)
Dept: HEMATOLOGY/ONCOLOGY | Facility: CLINIC | Age: 40
End: 2020-02-06
Payer: MEDICAID

## 2020-02-06 DIAGNOSIS — Z71.83 ENCOUNTER FOR NONPROCREATIVE GENETIC COUNSELING: Primary | ICD-10-CM

## 2020-02-06 PROCEDURE — 99999 PR PBB SHADOW E&M-EST. PATIENT-LVL II: CPT | Mod: PBBFAC,,, | Performed by: NURSE PRACTITIONER

## 2020-02-06 PROCEDURE — 99212 OFFICE O/P EST SF 10 MIN: CPT | Mod: PBBFAC | Performed by: NURSE PRACTITIONER

## 2020-02-06 PROCEDURE — 99203 OFFICE O/P NEW LOW 30 MIN: CPT | Mod: S$PBB,,, | Performed by: NURSE PRACTITIONER

## 2020-02-06 PROCEDURE — 99999 PR PBB SHADOW E&M-EST. PATIENT-LVL II: ICD-10-PCS | Mod: PBBFAC,,, | Performed by: NURSE PRACTITIONER

## 2020-02-06 PROCEDURE — 99203 PR OFFICE/OUTPT VISIT, NEW, LEVL III, 30-44 MIN: ICD-10-PCS | Mod: S$PBB,,, | Performed by: NURSE PRACTITIONER

## 2020-03-16 ENCOUNTER — TELEPHONE (OUTPATIENT)
Dept: OBSTETRICS AND GYNECOLOGY | Facility: CLINIC | Age: 40
End: 2020-03-16

## 2020-03-16 DIAGNOSIS — N76.0 BV (BACTERIAL VAGINOSIS): Primary | ICD-10-CM

## 2020-03-16 DIAGNOSIS — B96.89 BV (BACTERIAL VAGINOSIS): Primary | ICD-10-CM

## 2020-03-16 NOTE — TELEPHONE ENCOUNTER
rx for boric acid sent to [facility] [address].  Please give patient number to this pharmacy.    Dr haider

## 2020-03-16 NOTE — TELEPHONE ENCOUNTER
Called pt back and she informs us that she would like the boric acid rx as you recommended to a specific pharmacy you informed her had it-due to the metrogel not working. Pt was rescheduled for 5/11/2020. Please advise

## 2020-04-06 ENCOUNTER — TELEPHONE (OUTPATIENT)
Dept: HEMATOLOGY/ONCOLOGY | Facility: CLINIC | Age: 40
End: 2020-04-06

## 2020-04-06 NOTE — TELEPHONE ENCOUNTER
Phoned pt with assistance of [employer]  ID #[id number].  Offered pt opportunity to self-collect her saliva sample for oncology genetic testing given she did not show for her in-clinic sample collection appt in 02/2020.  Pt does desire to proceed with this.  We re-discussed implications of the MIGUELITO law briefly.  We reviewed pt's mailing address.  Advised pt that my office would be mailing her an informed consent form for her to sign and return to me via [employer] attachment, and advised pt that she will need to let me know once her saliva sample test kit is ready to be picked up so that I can arrange for a FedEx pickup from her home.  Pt is agreeable to proceed with 84-gene panel after risk and recommendation discussed.  Questions were encouraged and answered to patient's satisfaction, and patient verbalized understanding of information and agreement with the plan.

## 2020-04-08 ENCOUNTER — DOCUMENTATION ONLY (OUTPATIENT)
Dept: HEMATOLOGY/ONCOLOGY | Facility: CLINIC | Age: 40
End: 2020-04-08

## 2020-04-08 DIAGNOSIS — Z80.9 FAMILY HISTORY OF CANCER: Primary | ICD-10-CM

## 2020-04-08 NOTE — PROGRESS NOTES
My office is mailing [facility] genetic testing informed consent (in Panamanian language) or pt to complete and return to me.

## 2020-04-08 NOTE — PROGRESS NOTES
Hereditary cancer-related genetic testing order form (TRF) completed via [employer] provider portal, and [facility] now to ship saliva sample collection kit directly to pt's home.

## 2020-04-28 ENCOUNTER — PATIENT MESSAGE (OUTPATIENT)
Dept: OBSTETRICS AND GYNECOLOGY | Facility: CLINIC | Age: 40
End: 2020-04-28

## 2020-04-28 DIAGNOSIS — B96.89 BV (BACTERIAL VAGINOSIS): Primary | ICD-10-CM

## 2020-04-28 DIAGNOSIS — N76.0 BV (BACTERIAL VAGINOSIS): Primary | ICD-10-CM

## 2020-04-28 RX ORDER — CLINDAMYCIN HYDROCHLORIDE 300 MG/1
300 CAPSULE ORAL EVERY 8 HOURS
Qty: 30 CAPSULE | Refills: 0 | Status: SHIPPED | OUTPATIENT
Start: 2020-04-28 | End: 2020-05-08

## 2020-04-28 NOTE — PROGRESS NOTES
rx for clindamycin sent to treat possible BV infection.  Patient reports that boric acid and metrogel aren't working for her.    keyon haider MD

## 2020-05-27 ENCOUNTER — OFFICE VISIT (OUTPATIENT)
Dept: OBSTETRICS AND GYNECOLOGY | Facility: CLINIC | Age: 40
End: 2020-05-27
Payer: MEDICAID

## 2020-05-27 VITALS
WEIGHT: 143.75 LBS | HEIGHT: 64 IN | BODY MASS INDEX: 24.54 KG/M2 | SYSTOLIC BLOOD PRESSURE: 120 MMHG | DIASTOLIC BLOOD PRESSURE: 70 MMHG

## 2020-05-27 DIAGNOSIS — R10.10 PAIN OF UPPER ABDOMEN: ICD-10-CM

## 2020-05-27 DIAGNOSIS — Z01.419 ROUTINE GYNECOLOGICAL EXAMINATION: Primary | ICD-10-CM

## 2020-05-27 DIAGNOSIS — Z30.011 ENCOUNTER FOR INITIAL PRESCRIPTION OF CONTRACEPTIVE PILLS: ICD-10-CM

## 2020-05-27 DIAGNOSIS — N76.0 ACUTE VAGINITIS: ICD-10-CM

## 2020-05-27 PROCEDURE — 99999 PR PBB SHADOW E&M-EST. PATIENT-LVL III: CPT | Mod: PBBFAC,,, | Performed by: OBSTETRICS & GYNECOLOGY

## 2020-05-27 PROCEDURE — 87481 CANDIDA DNA AMP PROBE: CPT | Mod: 59

## 2020-05-27 PROCEDURE — 99999 PR PBB SHADOW E&M-EST. PATIENT-LVL III: ICD-10-PCS | Mod: PBBFAC,,, | Performed by: OBSTETRICS & GYNECOLOGY

## 2020-05-27 PROCEDURE — 99213 OFFICE O/P EST LOW 20 MIN: CPT | Mod: PBBFAC,PO | Performed by: OBSTETRICS & GYNECOLOGY

## 2020-05-27 PROCEDURE — 99395 PREV VISIT EST AGE 18-39: CPT | Mod: S$PBB,,, | Performed by: OBSTETRICS & GYNECOLOGY

## 2020-05-27 PROCEDURE — 99395 PR PREVENTIVE VISIT,EST,18-39: ICD-10-PCS | Mod: S$PBB,,, | Performed by: OBSTETRICS & GYNECOLOGY

## 2020-05-27 RX ORDER — NORETHINDRONE ACETATE AND ETHINYL ESTRADIOL 1; 5 MG/1; UG/1
1 TABLET ORAL DAILY
Qty: 30 TABLET | Refills: 11 | Status: SHIPPED | OUTPATIENT
Start: 2020-05-27 | End: 2022-03-08

## 2020-05-27 RX ORDER — CLINDAMYCIN HYDROCHLORIDE 300 MG/1
300 CAPSULE ORAL EVERY 8 HOURS
Qty: 30 CAPSULE | Refills: 0 | Status: SHIPPED | OUTPATIENT
Start: 2020-05-27 | End: 2020-06-06

## 2020-05-27 RX ORDER — IBUPROFEN 800 MG/1
800 TABLET ORAL EVERY 8 HOURS PRN
Qty: 30 TABLET | Refills: 2 | Status: SHIPPED | OUTPATIENT
Start: 2020-05-27 | End: 2021-05-27

## 2020-05-27 NOTE — PROGRESS NOTES
"40 yo Albanian speaking patient who presents for routine gyn visit.  Patient is very concerned that she continues to have recurrent BV.  Reports that infection comes after her cycle.  Reports that metrogel and boric acid never work.  She just recently tried clindamycin - and this improved her symptoms a little bit.  Wants to start a medication that will stop her cycles q month for now.  Patient has used seasonique in the past. Reports that she continued to have cycles.  Declines STD testing.  No problems with her breasts today.    ROS:  GENERAL: Denies weight gain or weight loss. Feeling well overall.   SKIN: Denies rash or lesions.   HEAD: Denies head injury or headache.   CHEST: Denies chest pain or shortness of breath.   CARDIOVASCULAR: Denies palpitations or left sided chest pain.   ABDOMEN: No abdominal pain, constipation, diarrhea, nausea, vomiting or rectal bleeding.   URINARY: No frequency, dysuria, hematuria, or burning on urination.  REPRODUCTIVE: See HPI.   BREASTS:  denies pain, lumps, or nipple discharge.   HEMATOLOGIC: No easy bruisability or excessive bleeding.   MUSCULOSKELETAL: Denies joint pain or swelling.   NEUROLOGIC: Denies syncope or weakness.   PSYCHIATRIC: Denies depression, anxiety or mood swings.         PE:   Vitals: /70   Ht 5' 4" (1.626 m)   Wt 65.2 kg (143 lb 11.8 oz)   LMP 05/15/2020 (Exact Date)   BMI 24.67 kg/m²   APPEARANCE: Well nourished, well developed, in no acute distress.  SKIN: Normal skin turgor, no lesions.  CHEST: Lungs clear to auscultation.  HEART: Regular rate and rhythm, no murmurs, rubs or gallops.  ABDOMEN: Soft. No tenderness or masses. No hepatosplenomegaly. No hernias.  BREASTS: Symmetrical, no skin changes or visible lesions. No palpable masses, nipple discharge or adenopathy bilaterally.  PELVIC: Normal external female genitalia without lesions. Normal hair distribution. Adequate perineal body, normal urethral meatus. Vagina moist and well rugated " without lesions, min discharge. Cervix pink and without lesions. No significant cystocele or rectocele. Bimanual exam showed uterus normal size, shape, position, mobile and nontender. Adnexa without masses or tenderness. Urethra and bladder normal.    AP  Routine gyn  -s/p normal breast exam:   -s/p normal pelvic exam:   -Pap uptodate from jan 2020.  -STD testing:  declined  -contraception/vaginitis: will try femhart to see if this will eliminate cycle  -vaginitis: affirm collected and clindamycin provided    Patient will contact office to let me know if new OCP is working for her    s MD meliza

## 2020-05-27 NOTE — LETTER
May 27, 2020      Sharifa Beltrán MD  [address]  [address]  [facility] [address]           [facility]  [address]  [facility] [address]  Phone: [phone]          Patient: Kenya Charles   MR Number: [id number]   YOB: 1980   Date of Visit: 5/27/2020       Dear Dr. Sharifa Beltrán:    Thank you for referring Kenya Charles to me for evaluation. Attached you will find relevant portions of my assessment and plan of care.    If you have questions, please do not hesitate to call me. I look forward to following Kenya Charles along with you.    Sincerely,    Royce Ruiz MD    Enclosure  CC:  No Recipients    If you would like to receive this communication electronically, please contact [email or web address] or [phone] to request more information on [employer] access.    For providers and/or their staff who would like to refer a patient to [facility], please contact us through our one-stop-shop provider referral line, [facility], at [phone].    If you feel you have received this communication in error or would no longer like to receive these types of communications, please e-mail [email or web address]

## 2020-07-29 DIAGNOSIS — M79.7 FIBROMYALGIA: ICD-10-CM

## 2020-07-29 RX ORDER — IBUPROFEN 600 MG/1
600 TABLET ORAL EVERY 8 HOURS PRN
Qty: 45 TABLET | Refills: 1 | OUTPATIENT
Start: 2020-07-29

## 2020-08-19 ENCOUNTER — TELEPHONE (OUTPATIENT)
Dept: OBSTETRICS AND GYNECOLOGY | Facility: CLINIC | Age: 40
End: 2020-08-19

## 2020-08-19 DIAGNOSIS — B96.89 BV (BACTERIAL VAGINOSIS): Primary | ICD-10-CM

## 2020-08-19 DIAGNOSIS — N76.0 BV (BACTERIAL VAGINOSIS): Primary | ICD-10-CM

## 2020-08-19 RX ORDER — CLINDAMYCIN HYDROCHLORIDE 300 MG/1
300 CAPSULE ORAL 2 TIMES DAILY
Qty: 30 CAPSULE | Refills: 12 | Status: SHIPPED | OUTPATIENT
Start: 2020-08-19 | End: 2021-09-01

## 2020-08-19 NOTE — TELEPHONE ENCOUNTER
----- Message from Adenike Andre sent at 8/19/2020  2:05 PM CDT -----  Contact: [phone]  Type:  Pharmacy Calling to Clarify an RX    Name of Caller: Marilee  Pharmacy Name: [facility]   Prescription Name:clindamycin (CLEOCIN) 300 MG capsule  What do they need to clarify?: Verifying Directions on the medication  Best Call Back Number: [phone]

## 2020-08-19 NOTE — TELEPHONE ENCOUNTER
Called pharmacy back and pharmacy just wanted to make sure rx was written correctly. Per  yes. Pharmacy verbalized understanding.

## 2021-04-16 ENCOUNTER — PATIENT MESSAGE (OUTPATIENT)
Dept: RESEARCH | Facility: HOSPITAL | Age: 41
End: 2021-04-16

## 2021-11-23 ENCOUNTER — OFFICE VISIT (OUTPATIENT)
Dept: OBSTETRICS AND GYNECOLOGY | Facility: CLINIC | Age: 41
End: 2021-11-23
Payer: MEDICAID

## 2021-11-23 VITALS
DIASTOLIC BLOOD PRESSURE: 60 MMHG | SYSTOLIC BLOOD PRESSURE: 106 MMHG | WEIGHT: 143.06 LBS | BODY MASS INDEX: 25.35 KG/M2 | HEIGHT: 63 IN

## 2021-11-23 DIAGNOSIS — Z12.4 CERVICAL CANCER SCREENING: ICD-10-CM

## 2021-11-23 DIAGNOSIS — N76.1 CHRONIC VAGINITIS: ICD-10-CM

## 2021-11-23 DIAGNOSIS — Z01.419 ROUTINE GYNECOLOGICAL EXAMINATION: Primary | ICD-10-CM

## 2021-11-23 DIAGNOSIS — Z12.31 VISIT FOR SCREENING MAMMOGRAM: ICD-10-CM

## 2021-11-23 DIAGNOSIS — R10.32 LLQ PAIN: ICD-10-CM

## 2021-11-23 PROCEDURE — 87624 HPV HI-RISK TYP POOLED RSLT: CPT | Performed by: OBSTETRICS & GYNECOLOGY

## 2021-11-23 PROCEDURE — 99213 OFFICE O/P EST LOW 20 MIN: CPT | Mod: PBBFAC,PO | Performed by: OBSTETRICS & GYNECOLOGY

## 2021-11-23 PROCEDURE — 88175 CYTOPATH C/V AUTO FLUID REDO: CPT | Performed by: OBSTETRICS & GYNECOLOGY

## 2021-11-23 PROCEDURE — 87591 N.GONORRHOEAE DNA AMP PROB: CPT | Mod: 59 | Performed by: OBSTETRICS & GYNECOLOGY

## 2021-11-23 PROCEDURE — 99999 PR PBB SHADOW E&M-EST. PATIENT-LVL III: CPT | Mod: PBBFAC,,, | Performed by: OBSTETRICS & GYNECOLOGY

## 2021-11-23 PROCEDURE — 87481 CANDIDA DNA AMP PROBE: CPT | Mod: 59 | Performed by: OBSTETRICS & GYNECOLOGY

## 2021-11-23 PROCEDURE — 87491 CHLMYD TRACH DNA AMP PROBE: CPT | Mod: 59 | Performed by: OBSTETRICS & GYNECOLOGY

## 2021-11-23 PROCEDURE — 99396 PR PREVENTIVE VISIT,EST,40-64: ICD-10-PCS | Mod: 57,S$PBB,, | Performed by: OBSTETRICS & GYNECOLOGY

## 2021-11-23 PROCEDURE — 99396 PREV VISIT EST AGE 40-64: CPT | Mod: 57,S$PBB,, | Performed by: OBSTETRICS & GYNECOLOGY

## 2021-11-23 PROCEDURE — 99999 PR PBB SHADOW E&M-EST. PATIENT-LVL III: ICD-10-PCS | Mod: PBBFAC,,, | Performed by: OBSTETRICS & GYNECOLOGY

## 2021-11-30 LAB
C TRACH DNA SPEC QL NAA+PROBE: NOT DETECTED
CLINICAL INFO: NORMAL
CYTO CVX: NORMAL
CYTOLOGIST CVX/VAG CYTO: NORMAL
CYTOLOGIST CVX/VAG CYTO: NORMAL
CYTOLOGY CMNT CVX/VAG CYTO-IMP: NORMAL
CYTOLOGY PAP THIN PREP EXPLANATION: NORMAL
DATE OF PREVIOUS PAP: NO
DATE PREVIOUS BX: NO
GEN CATEG CVX/VAG CYTO-IMP: NORMAL
HPV I/H RISK 4 DNA CVX QL NAA+PROBE: NOT DETECTED
LMP START DATE: NORMAL
MICROORGANISM CVX/VAG CYTO: NORMAL
N GONORRHOEA DNA SPEC QL NAA+PROBE: NOT DETECTED
PATHOLOGIST CVX/VAG CYTO: NORMAL
SERVICE CMNT-IMP: NORMAL
SPECIMEN SOURCE CVX/VAG CYTO: NORMAL
STAT OF ADQ CVX/VAG CYTO-IMP: NORMAL

## 2021-12-06 ENCOUNTER — TELEPHONE (OUTPATIENT)
Dept: OBSTETRICS AND GYNECOLOGY | Facility: CLINIC | Age: 41
End: 2021-12-06
Payer: MEDICAID

## 2021-12-06 ENCOUNTER — HOSPITAL ENCOUNTER (OUTPATIENT)
Dept: RADIOLOGY | Facility: HOSPITAL | Age: 41
Discharge: HOME | End: 2021-12-06
Attending: OBSTETRICS & GYNECOLOGY
Payer: MEDICAID

## 2021-12-06 VITALS — WEIGHT: 140 LBS | BODY MASS INDEX: 23.9 KG/M2 | HEIGHT: 64 IN

## 2021-12-06 DIAGNOSIS — Z12.31 VISIT FOR SCREENING MAMMOGRAM: ICD-10-CM

## 2021-12-06 PROCEDURE — 77067 SCR MAMMO BI INCL CAD: CPT | Mod: 26,,, | Performed by: RADIOLOGY

## 2021-12-06 PROCEDURE — 77067 MAMMO DIGITAL SCREENING BILAT WITH TOMO: ICD-10-PCS | Mod: 26,,, | Performed by: RADIOLOGY

## 2021-12-06 PROCEDURE — 77063 BREAST TOMOSYNTHESIS BI: CPT | Mod: 26,,, | Performed by: RADIOLOGY

## 2021-12-06 PROCEDURE — 77067 SCR MAMMO BI INCL CAD: CPT | Mod: TC,PN

## 2021-12-06 PROCEDURE — 77063 MAMMO DIGITAL SCREENING BILAT WITH TOMO: ICD-10-PCS | Mod: 26,,, | Performed by: RADIOLOGY

## 2022-01-07 ENCOUNTER — HOSPITAL ENCOUNTER (OUTPATIENT)
Dept: RADIOLOGY | Facility: HOSPITAL | Age: 42
Discharge: HOME | End: 2022-01-07
Attending: OBSTETRICS & GYNECOLOGY
Payer: MEDICAID

## 2022-01-07 ENCOUNTER — PATIENT MESSAGE (OUTPATIENT)
Dept: OBSTETRICS AND GYNECOLOGY | Facility: CLINIC | Age: 42
End: 2022-01-07
Payer: MEDICAID

## 2022-01-07 DIAGNOSIS — N76.0 BV (BACTERIAL VAGINOSIS): Primary | ICD-10-CM

## 2022-01-07 DIAGNOSIS — B96.89 BV (BACTERIAL VAGINOSIS): Primary | ICD-10-CM

## 2022-01-07 DIAGNOSIS — R10.32 LLQ PAIN: ICD-10-CM

## 2022-01-07 DIAGNOSIS — N83.209 CYST OF OVARY, UNSPECIFIED LATERALITY: ICD-10-CM

## 2022-01-07 PROCEDURE — 76830 US PELVIS COMP WITH TRANSVAG NON-OB (XPD): ICD-10-PCS | Mod: 26,,, | Performed by: INTERNAL MEDICINE

## 2022-01-07 PROCEDURE — 76830 TRANSVAGINAL US NON-OB: CPT | Mod: TC

## 2022-01-07 PROCEDURE — 76856 US EXAM PELVIC COMPLETE: CPT | Mod: 26,,, | Performed by: INTERNAL MEDICINE

## 2022-01-07 PROCEDURE — 76830 TRANSVAGINAL US NON-OB: CPT | Mod: 26,,, | Performed by: INTERNAL MEDICINE

## 2022-01-07 PROCEDURE — 76856 US PELVIS COMP WITH TRANSVAG NON-OB (XPD): ICD-10-PCS | Mod: 26,,, | Performed by: INTERNAL MEDICINE

## 2022-01-07 RX ORDER — CLINDAMYCIN HYDROCHLORIDE 300 MG/1
300 CAPSULE ORAL EVERY 8 HOURS
Qty: 30 CAPSULE | Refills: 12 | Status: SHIPPED | OUTPATIENT
Start: 2022-01-07 | End: 2022-01-17

## 2022-01-07 NOTE — PROGRESS NOTES
I spoke to the patient. She is aware of U/S results. Shows cyst on ovary. Recommend repeat US in 8-10 weeks to see if cyst has resolved.  This would be after first week of March 2022. Order placed. Patient will need to call  to schedule ultrasound.    The patient is requesting rx for clindamycin to help with BV. rx sent today.    Patient wants to proceed to OR for endometrial ablation. Will schedule.  Will send request to surgery coordinator today.    keyon haider MD

## 2022-01-24 DIAGNOSIS — Z01.818 PRE-OP TESTING: ICD-10-CM

## 2022-03-08 ENCOUNTER — OFFICE VISIT (OUTPATIENT)
Dept: OBSTETRICS AND GYNECOLOGY | Facility: CLINIC | Age: 42
End: 2022-03-08
Payer: MEDICAID

## 2022-03-08 VITALS
WEIGHT: 141.13 LBS | SYSTOLIC BLOOD PRESSURE: 118 MMHG | BODY MASS INDEX: 24.22 KG/M2 | DIASTOLIC BLOOD PRESSURE: 68 MMHG

## 2022-03-08 DIAGNOSIS — G89.18 POSTOPERATIVE PAIN: ICD-10-CM

## 2022-03-08 DIAGNOSIS — N92.0 MENORRHAGIA WITH REGULAR CYCLE: Primary | ICD-10-CM

## 2022-03-08 PROCEDURE — 3078F PR MOST RECENT DIASTOLIC BLOOD PRESSURE < 80 MM HG: ICD-10-PCS | Mod: CPTII,,, | Performed by: OBSTETRICS & GYNECOLOGY

## 2022-03-08 PROCEDURE — 99499 NO LOS: ICD-10-PCS | Mod: S$PBB,,, | Performed by: OBSTETRICS & GYNECOLOGY

## 2022-03-08 PROCEDURE — 3008F PR BODY MASS INDEX (BMI) DOCUMENTED: ICD-10-PCS | Mod: CPTII,,, | Performed by: OBSTETRICS & GYNECOLOGY

## 2022-03-08 PROCEDURE — 1159F PR MEDICATION LIST DOCUMENTED IN MEDICAL RECORD: ICD-10-PCS | Mod: CPTII,,, | Performed by: OBSTETRICS & GYNECOLOGY

## 2022-03-08 PROCEDURE — 99212 OFFICE O/P EST SF 10 MIN: CPT | Mod: PBBFAC,PO | Performed by: OBSTETRICS & GYNECOLOGY

## 2022-03-08 PROCEDURE — 3078F DIAST BP <80 MM HG: CPT | Mod: CPTII,,, | Performed by: OBSTETRICS & GYNECOLOGY

## 2022-03-08 PROCEDURE — 3074F SYST BP LT 130 MM HG: CPT | Mod: CPTII,,, | Performed by: OBSTETRICS & GYNECOLOGY

## 2022-03-08 PROCEDURE — 99999 PR PBB SHADOW E&M-EST. PATIENT-LVL II: CPT | Mod: PBBFAC,,, | Performed by: OBSTETRICS & GYNECOLOGY

## 2022-03-08 PROCEDURE — 3074F PR MOST RECENT SYSTOLIC BLOOD PRESSURE < 130 MM HG: ICD-10-PCS | Mod: CPTII,,, | Performed by: OBSTETRICS & GYNECOLOGY

## 2022-03-08 PROCEDURE — 99999 PR PBB SHADOW E&M-EST. PATIENT-LVL II: ICD-10-PCS | Mod: PBBFAC,,, | Performed by: OBSTETRICS & GYNECOLOGY

## 2022-03-08 PROCEDURE — 3008F BODY MASS INDEX DOCD: CPT | Mod: CPTII,,, | Performed by: OBSTETRICS & GYNECOLOGY

## 2022-03-08 PROCEDURE — 1159F MED LIST DOCD IN RCRD: CPT | Mod: CPTII,,, | Performed by: OBSTETRICS & GYNECOLOGY

## 2022-03-08 PROCEDURE — 99499 UNLISTED E&M SERVICE: CPT | Mod: S$PBB,,, | Performed by: OBSTETRICS & GYNECOLOGY

## 2022-03-08 RX ORDER — IBUPROFEN 800 MG/1
800 TABLET ORAL EVERY 8 HOURS PRN
Qty: 30 TABLET | Refills: 0 | Status: SHIPPED | OUTPATIENT
Start: 2022-03-08 | End: 2023-03-08

## 2022-03-08 RX ORDER — SODIUM CHLORIDE, SODIUM LACTATE, POTASSIUM CHLORIDE, CALCIUM CHLORIDE 600; 310; 30; 20 MG/100ML; MG/100ML; MG/100ML; MG/100ML
INJECTION, SOLUTION INTRAVENOUS CONTINUOUS
Status: CANCELLED | OUTPATIENT
Start: 2022-03-08

## 2022-03-08 RX ORDER — MUPIROCIN 20 MG/G
OINTMENT TOPICAL
Status: CANCELLED | OUTPATIENT
Start: 2022-03-08

## 2022-03-08 RX ORDER — CLINDAMYCIN HYDROCHLORIDE 300 MG/1
300 CAPSULE ORAL 3 TIMES DAILY
COMMUNITY
Start: 2022-02-27 | End: 2023-01-23

## 2022-03-08 NOTE — H&P (VIEW-ONLY)
Dr. Ruiz' Preop Visit    Diagnosis: irregular menstrual cycles  Planned Procedure: hysteroscopy, D&C, endometrial ablation  Date of Planned Procedure: 3/10/2022    Cc: I am here for preop for my surgery    HPI: Kenya Charles is a 41 y.o. female with history of irregular bleeding. Reports that this causes her to have irregular vaginal discharge.    ROS:  GENERAL: Denies weight gain or weight loss. Feeling well overall.   SKIN: Denies rash or lesions.   HEAD: Denies head injury or headache.   CHEST: Denies chest pain or shortness of breath.   CARDIOVASCULAR: Denies palpitations or left sided chest pain.   ABDOMEN: No abdominal pain, constipation, diarrhea, nausea, vomiting or rectal bleeding.   URINARY: No frequency, dysuria, hematuria, or burning on urination.  REPRODUCTIVE: See HPI.   HEMATOLOGIC: No easy bruisability or excessive bleeding.   MUSCULOSKELETAL: Denies joint pain or swelling.   NEUROLOGIC: Denies syncope or weakness.   PSYCHIATRIC: Denies depression, anxiety or mood swings.     PMHx:   Past Medical History:   Diagnosis Date    PONV (postoperative nausea and vomiting)        Surgical hx:   Past Surgical History:   Procedure Laterality Date     SECTION      TUBAL LIGATION         GYNhx: Patient's last menstrual period was 2022 (approximate).    Obhx:     ALLERGY: Percocet/tylenol products    MEDS: Reviewed, reconciled      Current Outpatient Medications:     ibuprofen (ADVIL,MOTRIN) 600 MG tablet, Take 1 tablet (600 mg total) by mouth every 8 (eight) hours as needed for Pain., Disp: 45 tablet, Rfl: 1    acetaminophen (TYLENOL) suppository, Place 1 suppository (325 mg total) rectally every 6 (six) hours as needed for Pain. (Patient not taking: No sig reported), Disp: , Rfl: 0    clindamycin (CLEOCIN) 300 MG capsule, Take 300 mg by mouth 3 (three) times daily., Disp: , Rfl:     Social hx:    Social History     Socioeconomic History    Marital status:    Tobacco  Use    Smoking status: Never Smoker    Smokeless tobacco: Never Used   Substance and Sexual Activity    Alcohol use: No    Drug use: Never    Sexual activity: Yes     Partners: Male     Birth control/protection: See Surgical Hx       Family hx:    Family History   Problem Relation Age of Onset    Hypertension Mother     Cervical cancer Maternal Aunt     Ovarian cancer Maternal Aunt     Cancer Paternal Aunt     Breast cancer Maternal Grandmother        PE:   Vitals: /68   Wt 64 kg (141 lb 1.5 oz)   LMP 02/22/2022 (Approximate)   BMI 24.22 kg/m²   APPEARANCE: Well nourished, well developed, in no acute distress.  Exam: deferred    Labs: **      Imaging: pelvic US jan 2022    Narrative & Impression  EXAMINATION:  US PELVIS COMP WITH TRANSVAG NON-OB (XPD)     CLINICAL HISTORY:  pelvic pain - concerned about ovary on the left side; Left lower quadrant pain     TECHNIQUE:  Transabdominal sonography of the pelvis was performed, followed by transvaginal sonography to better evaluate the uterus and ovaries.     COMPARISON:  None.     FINDINGS:  Uterus:     Size: 8.1 x 5.4 x 4.0 cm     Masses: There is a 0.5 cm intramural fibroid within the fundus.  Nabothian cysts are present.     Endometrium: Normal in this pre menopausal patient, measuring 9 mm.     Right ovary:     Size: 4.1 x 4.0 x 2.2 cm     Appearance: There is a 1.9 x 1.8 x 1.4 cm hypoechoic lesion with increased through transmission and no internal Doppler signal, likely a dominant follicle.  There is a 1.8 x 1.5 x 1.4 cm thick-walled hypoechoic lesion with increased through transmission and peripheral hypervascularity.     Vascular flow: Normal.     Left ovary:     Size: 2.6 x 1.9 x 1.7 cm     Appearance: Normal, noting a 0.9 cm follicle.     Vascular Flow: Normal.     Free Fluid:     Trace free fluid in the left adnexa.     Impression:     1.8 cm thick-walled hypoechoic lesion in the right ovary, likely a hemorrhagic or corpus luteum cyst.   Consider follow-up pelvic ultrasound in 8-10 weeks to ensure resolution.     Small intramural fibroid.    A/P: Kenya Charles is a 41 y.o. female who presents for preop evaluation.      1) Surgery:   -Risks and benefits of surgery discussed with the patient.  All questions were answered.  Consents for surgery and blood were signed by the patient today.  -Patient has been instructed to be NPO on night prior to procedure  -Preop labs ordered: UPT  -Rx for postop pain management provided to patient at today's preop visit: Motrin  -postop visit scheduled march 23, 2022 at 11am        RENATE Ruiz MD

## 2022-03-10 ENCOUNTER — HOSPITAL ENCOUNTER (OUTPATIENT)
Facility: HOSPITAL | Age: 42
Discharge: HOME | End: 2022-03-10
Attending: OBSTETRICS & GYNECOLOGY | Admitting: OBSTETRICS & GYNECOLOGY
Payer: MEDICAID

## 2022-03-10 ENCOUNTER — ANESTHESIA EVENT (OUTPATIENT)
Dept: SURGERY | Facility: HOSPITAL | Age: 42
End: 2022-03-10
Payer: MEDICAID

## 2022-03-10 ENCOUNTER — ANESTHESIA (OUTPATIENT)
Dept: SURGERY | Facility: HOSPITAL | Age: 42
End: 2022-03-10
Payer: MEDICAID

## 2022-03-10 VITALS
WEIGHT: 140 LBS | BODY MASS INDEX: 23.9 KG/M2 | HEART RATE: 94 BPM | DIASTOLIC BLOOD PRESSURE: 71 MMHG | OXYGEN SATURATION: 98 % | RESPIRATION RATE: 20 BRPM | TEMPERATURE: 98 F | SYSTOLIC BLOOD PRESSURE: 121 MMHG | HEIGHT: 64 IN

## 2022-03-10 DIAGNOSIS — N92.0 MENORRHAGIA WITH REGULAR CYCLE: ICD-10-CM

## 2022-03-10 LAB
B-HCG UR QL: NEGATIVE
CTP QC/QA: YES

## 2022-03-10 PROCEDURE — 88305 TISSUE EXAM BY PATHOLOGIST: CPT | Performed by: STUDENT IN AN ORGANIZED HEALTH CARE EDUCATION/TRAINING PROGRAM

## 2022-03-10 PROCEDURE — 25000003 PHARM REV CODE 250: Performed by: NURSE ANESTHETIST, CERTIFIED REGISTERED

## 2022-03-10 PROCEDURE — 25000003 PHARM REV CODE 250: Performed by: NURSE PRACTITIONER

## 2022-03-10 PROCEDURE — 36000707: Performed by: OBSTETRICS & GYNECOLOGY

## 2022-03-10 PROCEDURE — 36000706: Performed by: OBSTETRICS & GYNECOLOGY

## 2022-03-10 PROCEDURE — 63600175 PHARM REV CODE 636 W HCPCS: Performed by: OBSTETRICS & GYNECOLOGY

## 2022-03-10 PROCEDURE — 63600175 PHARM REV CODE 636 W HCPCS: Performed by: NURSE ANESTHETIST, CERTIFIED REGISTERED

## 2022-03-10 PROCEDURE — 58563 HYSTEROSCOPY ABLATION: CPT | Mod: ,,, | Performed by: OBSTETRICS & GYNECOLOGY

## 2022-03-10 PROCEDURE — 58563 PR HYSTEROSCOPY,W/ENDOMETRIAL ABLATION: ICD-10-PCS | Mod: ,,, | Performed by: OBSTETRICS & GYNECOLOGY

## 2022-03-10 PROCEDURE — 71000015 HC POSTOP RECOV 1ST HR: Performed by: OBSTETRICS & GYNECOLOGY

## 2022-03-10 PROCEDURE — 88305 TISSUE EXAM BY PATHOLOGIST: CPT | Mod: 26,,, | Performed by: STUDENT IN AN ORGANIZED HEALTH CARE EDUCATION/TRAINING PROGRAM

## 2022-03-10 PROCEDURE — 71000033 HC RECOVERY, INTIAL HOUR: Performed by: OBSTETRICS & GYNECOLOGY

## 2022-03-10 PROCEDURE — 63600175 PHARM REV CODE 636 W HCPCS: Performed by: ANESTHESIOLOGY

## 2022-03-10 PROCEDURE — 88305 TISSUE EXAM BY PATHOLOGIST: ICD-10-PCS | Mod: 26,,, | Performed by: STUDENT IN AN ORGANIZED HEALTH CARE EDUCATION/TRAINING PROGRAM

## 2022-03-10 PROCEDURE — 37000008 HC ANESTHESIA 1ST 15 MINUTES: Performed by: OBSTETRICS & GYNECOLOGY

## 2022-03-10 PROCEDURE — 00952 ANES VAG PX HYSTSC&/HSG: CPT | Performed by: OBSTETRICS & GYNECOLOGY

## 2022-03-10 PROCEDURE — 81025 URINE PREGNANCY TEST: CPT | Performed by: OBSTETRICS & GYNECOLOGY

## 2022-03-10 PROCEDURE — 71000039 HC RECOVERY, EACH ADD'L HOUR: Performed by: OBSTETRICS & GYNECOLOGY

## 2022-03-10 PROCEDURE — 37000009 HC ANESTHESIA EA ADD 15 MINS: Performed by: OBSTETRICS & GYNECOLOGY

## 2022-03-10 PROCEDURE — 27201423 OPTIME MED/SURG SUP & DEVICES STERILE SUPPLY: Performed by: OBSTETRICS & GYNECOLOGY

## 2022-03-10 RX ORDER — ONDANSETRON 2 MG/ML
INJECTION INTRAMUSCULAR; INTRAVENOUS
Status: DISCONTINUED | OUTPATIENT
Start: 2022-03-10 | End: 2022-03-10

## 2022-03-10 RX ORDER — ONDANSETRON 2 MG/ML
4 INJECTION INTRAMUSCULAR; INTRAVENOUS ONCE AS NEEDED
Status: DISCONTINUED | OUTPATIENT
Start: 2022-03-10 | End: 2022-03-10 | Stop reason: HOSPADM

## 2022-03-10 RX ORDER — IBUPROFEN 600 MG/1
600 TABLET ORAL EVERY 6 HOURS PRN
Status: DISCONTINUED | OUTPATIENT
Start: 2022-03-10 | End: 2022-03-10 | Stop reason: HOSPADM

## 2022-03-10 RX ORDER — SODIUM CHLORIDE, SODIUM LACTATE, POTASSIUM CHLORIDE, CALCIUM CHLORIDE 600; 310; 30; 20 MG/100ML; MG/100ML; MG/100ML; MG/100ML
INJECTION, SOLUTION INTRAVENOUS CONTINUOUS
Status: DISCONTINUED | OUTPATIENT
Start: 2022-03-10 | End: 2022-03-10 | Stop reason: HOSPADM

## 2022-03-10 RX ORDER — FLUMAZENIL 0.1 MG/ML
INJECTION INTRAVENOUS
Status: DISCONTINUED | OUTPATIENT
Start: 2022-03-10 | End: 2022-03-10

## 2022-03-10 RX ORDER — FENTANYL CITRATE 50 UG/ML
INJECTION, SOLUTION INTRAMUSCULAR; INTRAVENOUS
Status: DISCONTINUED | OUTPATIENT
Start: 2022-03-10 | End: 2022-03-10

## 2022-03-10 RX ORDER — PROCHLORPERAZINE EDISYLATE 5 MG/ML
5 INJECTION INTRAMUSCULAR; INTRAVENOUS EVERY 6 HOURS PRN
Status: DISCONTINUED | OUTPATIENT
Start: 2022-03-10 | End: 2022-03-10 | Stop reason: HOSPADM

## 2022-03-10 RX ORDER — SODIUM CHLORIDE 0.9 % (FLUSH) 0.9 %
10 SYRINGE (ML) INJECTION
Status: DISCONTINUED | OUTPATIENT
Start: 2022-03-10 | End: 2022-03-10 | Stop reason: HOSPADM

## 2022-03-10 RX ORDER — DEXAMETHASONE SODIUM PHOSPHATE 4 MG/ML
INJECTION, SOLUTION INTRA-ARTICULAR; INTRALESIONAL; INTRAMUSCULAR; INTRAVENOUS; SOFT TISSUE
Status: DISCONTINUED | OUTPATIENT
Start: 2022-03-10 | End: 2022-03-10

## 2022-03-10 RX ORDER — KETOROLAC TROMETHAMINE 30 MG/ML
INJECTION, SOLUTION INTRAMUSCULAR; INTRAVENOUS
Status: DISCONTINUED | OUTPATIENT
Start: 2022-03-10 | End: 2022-03-10

## 2022-03-10 RX ORDER — DIPHENHYDRAMINE HYDROCHLORIDE 50 MG/ML
25 INJECTION INTRAMUSCULAR; INTRAVENOUS EVERY 4 HOURS PRN
Status: DISCONTINUED | OUTPATIENT
Start: 2022-03-10 | End: 2022-03-10 | Stop reason: HOSPADM

## 2022-03-10 RX ORDER — PROPOFOL 10 MG/ML
VIAL (ML) INTRAVENOUS
Status: DISCONTINUED | OUTPATIENT
Start: 2022-03-10 | End: 2022-03-10

## 2022-03-10 RX ORDER — DIPHENHYDRAMINE HCL 25 MG
25 CAPSULE ORAL EVERY 4 HOURS PRN
Status: DISCONTINUED | OUTPATIENT
Start: 2022-03-10 | End: 2022-03-10 | Stop reason: HOSPADM

## 2022-03-10 RX ORDER — HYDROMORPHONE HYDROCHLORIDE 2 MG/ML
0.5 INJECTION, SOLUTION INTRAMUSCULAR; INTRAVENOUS; SUBCUTANEOUS EVERY 5 MIN PRN
Status: DISCONTINUED | OUTPATIENT
Start: 2022-03-10 | End: 2022-03-10 | Stop reason: HOSPADM

## 2022-03-10 RX ORDER — DIPHENHYDRAMINE HYDROCHLORIDE 50 MG/ML
INJECTION INTRAMUSCULAR; INTRAVENOUS
Status: DISCONTINUED | OUTPATIENT
Start: 2022-03-10 | End: 2022-03-10

## 2022-03-10 RX ORDER — ONDANSETRON 8 MG/1
8 TABLET, ORALLY DISINTEGRATING ORAL EVERY 8 HOURS PRN
Status: DISCONTINUED | OUTPATIENT
Start: 2022-03-10 | End: 2022-03-10 | Stop reason: HOSPADM

## 2022-03-10 RX ORDER — SCOLOPAMINE TRANSDERMAL SYSTEM 1 MG/1
1 PATCH, EXTENDED RELEASE TRANSDERMAL
Status: DISCONTINUED | OUTPATIENT
Start: 2022-03-10 | End: 2022-03-10 | Stop reason: HOSPADM

## 2022-03-10 RX ORDER — LIDOCAINE HYDROCHLORIDE 20 MG/ML
INJECTION INTRAVENOUS
Status: DISCONTINUED | OUTPATIENT
Start: 2022-03-10 | End: 2022-03-10

## 2022-03-10 RX ORDER — MIDAZOLAM HYDROCHLORIDE 1 MG/ML
INJECTION, SOLUTION INTRAMUSCULAR; INTRAVENOUS
Status: DISCONTINUED | OUTPATIENT
Start: 2022-03-10 | End: 2022-03-10

## 2022-03-10 RX ADMIN — ONDANSETRON 8 MG: 2 INJECTION, SOLUTION INTRAMUSCULAR; INTRAVENOUS at 07:03

## 2022-03-10 RX ADMIN — PROPOFOL 200 MG: 10 INJECTION, EMULSION INTRAVENOUS at 07:03

## 2022-03-10 RX ADMIN — SODIUM CHLORIDE, SODIUM LACTATE, POTASSIUM CHLORIDE, AND CALCIUM CHLORIDE: .6; .31; .03; .02 INJECTION, SOLUTION INTRAVENOUS at 06:03

## 2022-03-10 RX ADMIN — GLYCOPYRROLATE 0.1 MG: 0.2 INJECTION, SOLUTION INTRAMUSCULAR; INTRAVITREAL at 07:03

## 2022-03-10 RX ADMIN — HYPROMELLOSE 2910 2 DROP: 5 SOLUTION OPHTHALMIC at 07:03

## 2022-03-10 RX ADMIN — KETOROLAC TROMETHAMINE 30 MG: 30 INJECTION, SOLUTION INTRAMUSCULAR; INTRAVENOUS at 07:03

## 2022-03-10 RX ADMIN — FENTANYL CITRATE 100 MCG: 50 INJECTION, SOLUTION INTRAMUSCULAR; INTRAVENOUS at 07:03

## 2022-03-10 RX ADMIN — DEXAMETHASONE SODIUM PHOSPHATE 8 MG: 4 INJECTION, SOLUTION INTRA-ARTICULAR; INTRALESIONAL; INTRAMUSCULAR; INTRAVENOUS; SOFT TISSUE at 07:03

## 2022-03-10 RX ADMIN — FLUMAZENIL 0.5 MG: 0.1 INJECTION, SOLUTION INTRAVENOUS at 07:03

## 2022-03-10 RX ADMIN — SCOPALAMINE 1 PATCH: 1 PATCH, EXTENDED RELEASE TRANSDERMAL at 06:03

## 2022-03-10 RX ADMIN — HYDROMORPHONE HYDROCHLORIDE 0.5 MG: 2 INJECTION INTRAMUSCULAR; INTRAVENOUS; SUBCUTANEOUS at 08:03

## 2022-03-10 RX ADMIN — DIPHENHYDRAMINE HYDROCHLORIDE 12.5 MG: 50 INJECTION INTRAMUSCULAR; INTRAVENOUS at 07:03

## 2022-03-10 RX ADMIN — MIDAZOLAM 2 MG: 1 INJECTION INTRAMUSCULAR; INTRAVENOUS at 07:03

## 2022-03-10 RX ADMIN — LIDOCAINE HYDROCHLORIDE 100 MG: 20 INJECTION, SOLUTION INTRAVENOUS at 07:03

## 2022-03-10 NOTE — PROGRESS NOTES
Patient has met PACU discharge criteria, VSS, pain well controlled. Family updated by phone. Released from PACU by Dr. carroll

## 2022-03-10 NOTE — INTERVAL H&P NOTE
"The patient has been examined and the H&P has been reviewed:    I concur with the findings and no changes have occurred since H&P was written.    Surgery risks, benefits and alternative options discussed and understood by patient/family.    /70   Pulse 84   Temp 98.9 °F (37.2 °C) (Skin)   Resp 16   Ht 5' 4" (1.626 m)   Wt 63.5 kg (140 lb)   LMP 02/22/2022 (Approximate)   SpO2 98%   Breastfeeding No   BMI 24.03 kg/m²     Will proceed with hysteroscopy, D&C, endometrial ablation    ANNIE haider MD    "

## 2022-03-10 NOTE — ANESTHESIA PREPROCEDURE EVALUATION
03/10/2022     Kenya Charles is a 41 y.o., female is scheduled for laparoscopic tubal ligation under GETA 2016.    Pre-op Assessment    I have reviewed the Patient Summary Reports.    I have reviewed the Nursing Notes.    I have reviewed the Medications.     Review of Systems  Anesthesia Hx:  Hx of Anesthetic complications  History of prior surgery of interest to airway management or planning: Previous anesthesia: Epidural  2006  with epidural.  Personal Hx of Anesthesia complications, Post-Operative Nausea/Vomiting, with every anesthetic, treatment not known   Social:  Non-Smoker, No Alcohol Use    Hematology/Oncology:  Hematology Normal        EENT/Dental:EENT/Dental Normal   Cardiovascular:   Exercise tolerance: good Denies Hypertension.  Denies Dysrhythmias.   Denies Angina.        Pulmonary:  Pulmonary Normal    Renal/:  Renal/ Normal     Hepatic/GI:  Hepatic/GI Normal    Neurological:  Neurology Normal    Endocrine:  Endocrine Normal        Physical Exam  General:  Well nourished      Airway/Jaw/Neck:  Airway Findings: Mouth Opening: Normal   Tongue: Normal   General Airway Assessment: Adult Mallampati: I  TM Distance: 4 - 6 cm      Neck ROM: Normal ROM      Eyes/Ears/Nose:  EYES/EARS/NOSE FINDINGS: Normal   Dental:  Dental Findings: In tact, Periodontal disease, Mild     Chest/Lungs:  Chest/Lungs Clear    Heart/Vascular:  Heart Findings: Normal Heart murmur: negative    Abdomen:  Abdomen Findings: Normal      Mental Status:  Mental Status Findings: Normal        Anesthesia Plan  Type of Anesthesia, risks & benefits discussed:  Anesthesia Type:  general    Patient's Preference:   Plan Factors:          Intra-op Monitoring Plan: Standard ASA Monitors  Intra-op Monitoring Plan Comments:   Post Op Pain Control Plan: multimodal analgesia  Post Op Pain Control Plan Comments:      Induction:   IV  Beta Blocker:  Patient is not currently on a Beta-Blocker (No further documentation required).       Informed Consent: Informed consent signed with the Patient and all parties understand the risks and agree with anesthesia plan.  All questions answered.  Anesthesia consent signed with patient.  ASA Score: 2     Day of Surgery Review of History & Physical: I have interviewed and examined the patient. I have reviewed the patient's H&P dated:  There are no significant changes.      Anesthesia Plan Notes: Anesthesia consent will be obtained prior to procedure on 11/04/2016.          Ready For Surgery From Anesthesia Perspective.           Physical Exam  General: Well nourished    Airway:  Mallampati: I   Mouth Opening: Normal  TM Distance: 4 - 6 cm  Tongue: Normal  Neck ROM: Normal ROM    Dental:  In tact, Periodontal disease, Mild          Anesthesia Plan  Type of Anesthesia, risks & benefits discussed:    Anesthesia Type: general  Intra-op Monitoring Plan: Standard ASA Monitors  Post Op Pain Control Plan: multimodal analgesia  Induction:  IV  Airway Plan: Direct  Informed Consent: Informed consent signed with the Patient and all parties understand the risks and agree with anesthesia plan.  All questions answered.   ASA Score: 2  Day of Surgery Review of History & Physical: I have interviewed and examined the patient. I have reviewed the patient's H&P dated:   Anesthesia Plan Notes: Anesthesia consent will be obtained prior to procedure on 11/04/2016.      Ready For Surgery From Anesthesia Perspective.       .

## 2022-03-10 NOTE — OP NOTE
Operative Note    Date: 3/10/2022  Time: 7:48 AM  Preoperative Diagnosis:   menorrhagia  Postoperative Diagnosis: same  Procedure: hysteroscopy, D&C, endometrial ablation  Type of Anesthesia: general   Surgeon: Royce Ruiz MD  Assistant Surgeon: shannon barnes  Specimen/Tissue Removed: endometrial currettings  Estimated Blood Loss: min  Complications: none  Findings: normal appearing endometrial cavity    TECHNIQUE:  The patient was taken to the Operating Room where her general   anesthesia was found to be adequate.  Her legs were placed in Kevin stirrups.    She was then prepared and draped in normal sterile fashion.  Speculum was placed   into the vagina where the cervix was visualized.  Single-tooth tenaculum was   used to grasp at the anterior cervix.  The patient's uterus was sounded.  The   cervical dilators were used to dilate the cervix.  The hysteroscope was then   placed into the uterus where the aforementioned findings were noted.  The camera   was then removed and then a sharp curette was used to obtain endometrial curetting.  The sample was sent to Pathology.  The endometrial ablation device was placed into the uterus.   Cavity assessment was completed and noted to be successful and the device was then turned on.  The   ablation started and was completed without complications.  The endometrial   device was then removed from the uterine cavity.  The hysteroscope was then   replaced into the uterus where the endometrial cavity was noted to be ablated.    The hysteroscope was then removed.  The single-tooth tenaculum was removed.    Hemostasis was noted.  Speculum was removed from the vagina.    The patient tolerated the procedure well.  Sponge and needle counts were correct   x3.  The patient did not receive any antibiotics as this was not indicated.    She was extubated successfully in the Operating Room and then taken to the PACU   in stable condition.        RENATE Ruiz MD

## 2022-03-10 NOTE — DISCHARGE SUMMARY
[facility] - [facility] (Hospital)  Discharge Note  Short Stay    Procedure(s) (LRB):  HYSTEROSCOPY, WITH DILATION AND CURETTAGE OF UTERUS (N/A)  ABLATION, ENDOMETRIUM (N/A)    OUTCOME: Patient tolerated treatment/procedure well without complication and is now ready for discharge.    DISPOSITION: Home or Self Care    FINAL DIAGNOSIS:  Menorrhagia    FOLLOWUP: In clinic    DISCHARGE INSTRUCTIONS:    Discharge Procedure Orders   Diet general        TIME SPENT ON DISCHARGE: 5 minutes

## 2022-03-10 NOTE — ANESTHESIA PROCEDURE NOTES
LMA    Date/Time: 3/10/2022 7:15 AM  Performed by: Arnulfo Pickard CRNA  Authorized by: Beatrice Adan MD     Intubation:     Induction:  Intravenous    Intubated:  Postinduction    Mask Ventilation:  Easy mask    Attempts:  1    Attempted By:  CRNA    Difficult Airway Encountered?: No      Complications:  None    Airway Device:  Supraglottic airway/LMA    Airway Device Size:  4.0    Style/Cuff Inflation:  Cuffed (inflated to minimal occlusive pressure)    Inflation Amount (mL):  8    Secured at:  The lips    Placement Verified By:  Capnometry    Complicating Factors:  None    Findings Post-Intubation:  BS equal bilateral and atraumatic/condition of teeth unchanged

## 2022-03-10 NOTE — PLAN OF CARE
Meets criteria for discharge. VSS, AAOx3. Tolerating po fluids without nausea. IV discontinued with tip intact.Discharge instructions given. Time allowed for questions. Verbalizes understanding.  Discharged to home in good condition.

## 2022-03-10 NOTE — TRANSFER OF CARE
"Anesthesia Transfer of Care Note    Patient: Kenya Charles    Procedure(s) Performed: Procedure(s) (LRB):  HYSTEROSCOPY, WITH DILATION AND CURETTAGE OF UTERUS (N/A)  ABLATION, ENDOMETRIUM (N/A)    Patient location: PACU    Anesthesia Type: general    Transport from OR: Transported from OR on 2-3 L/min O2 by NC with adequate spontaneous ventilation    Post pain: adequate analgesia    Post assessment: no apparent anesthetic complications and tolerated procedure well    Post vital signs: stable    Level of consciousness: awake and alert    Nausea/Vomiting: no nausea/vomiting    Complications: none    Transfer of care protocol was followed      Last vitals:   Visit Vitals  /70   Pulse 84   Temp 37.2 °C (98.9 °F) (Skin)   Resp 16   Ht 5' 4" (1.626 m)   Wt 63.5 kg (140 lb)   LMP 02/22/2022 (Approximate)   SpO2 98%   Breastfeeding No   BMI 24.03 kg/m²     "

## 2022-03-10 NOTE — ANESTHESIA POSTPROCEDURE EVALUATION
Anesthesia Post Evaluation    Patient: Kenya Charles    Procedure(s) Performed: Procedure(s) (LRB):  HYSTEROSCOPY, WITH DILATION AND CURETTAGE OF UTERUS (N/A)  ABLATION, ENDOMETRIUM (N/A)    Final Anesthesia Type: general      Patient location during evaluation: PACU  Patient participation: Yes- Able to Participate  Level of consciousness: awake and alert  Post-procedure vital signs: reviewed and stable  Pain management: adequate  Airway patency: patent    PONV status at discharge: No PONV  Anesthetic complications: no      Cardiovascular status: blood pressure returned to baseline  Respiratory status: unassisted  Hydration status: euvolemic  Follow-up not needed.          Vitals Value Taken Time   /71 03/10/22 0945   Temp 36.7 °C (98.1 °F) 03/10/22 0945   Pulse 94 03/10/22 0945   Resp 20 03/10/22 0945   SpO2 98 % 03/10/22 0945         Event Time   Out of Recovery 09:08:01         Pain/Janelle Score: Pain Rating Prior to Med Admin: 4 (3/10/2022  8:42 AM)  Pain Rating Post Med Admin: 0 (3/10/2022  9:15 AM)  Janelle Score: 10 (3/10/2022  9:45 AM)

## 2022-03-15 LAB
FINAL PATHOLOGIC DIAGNOSIS: NORMAL
GROSS: NORMAL
Lab: NORMAL

## 2022-03-23 ENCOUNTER — OFFICE VISIT (OUTPATIENT)
Dept: OBSTETRICS AND GYNECOLOGY | Facility: CLINIC | Age: 42
End: 2022-03-23
Payer: MEDICAID

## 2022-03-23 VITALS
DIASTOLIC BLOOD PRESSURE: 77 MMHG | HEIGHT: 64 IN | BODY MASS INDEX: 23.98 KG/M2 | SYSTOLIC BLOOD PRESSURE: 111 MMHG | WEIGHT: 140.44 LBS

## 2022-03-23 DIAGNOSIS — Z48.89 ENCOUNTER FOR POSTOPERATIVE CARE: Primary | ICD-10-CM

## 2022-03-23 PROCEDURE — 1159F MED LIST DOCD IN RCRD: CPT | Mod: CPTII,,, | Performed by: OBSTETRICS & GYNECOLOGY

## 2022-03-23 PROCEDURE — 3008F PR BODY MASS INDEX (BMI) DOCUMENTED: ICD-10-PCS | Mod: CPTII,,, | Performed by: OBSTETRICS & GYNECOLOGY

## 2022-03-23 PROCEDURE — 99999 PR PBB SHADOW E&M-EST. PATIENT-LVL III: ICD-10-PCS | Mod: PBBFAC,,, | Performed by: OBSTETRICS & GYNECOLOGY

## 2022-03-23 PROCEDURE — 99213 OFFICE O/P EST LOW 20 MIN: CPT | Mod: PBBFAC,PO | Performed by: OBSTETRICS & GYNECOLOGY

## 2022-03-23 PROCEDURE — 3074F PR MOST RECENT SYSTOLIC BLOOD PRESSURE < 130 MM HG: ICD-10-PCS | Mod: CPTII,,, | Performed by: OBSTETRICS & GYNECOLOGY

## 2022-03-23 PROCEDURE — 99999 PR PBB SHADOW E&M-EST. PATIENT-LVL III: CPT | Mod: PBBFAC,,, | Performed by: OBSTETRICS & GYNECOLOGY

## 2022-03-23 PROCEDURE — 1159F PR MEDICATION LIST DOCUMENTED IN MEDICAL RECORD: ICD-10-PCS | Mod: CPTII,,, | Performed by: OBSTETRICS & GYNECOLOGY

## 2022-03-23 PROCEDURE — 99024 POSTOP FOLLOW-UP VISIT: CPT | Mod: ,,, | Performed by: OBSTETRICS & GYNECOLOGY

## 2022-03-23 PROCEDURE — 3074F SYST BP LT 130 MM HG: CPT | Mod: CPTII,,, | Performed by: OBSTETRICS & GYNECOLOGY

## 2022-03-23 PROCEDURE — 3078F PR MOST RECENT DIASTOLIC BLOOD PRESSURE < 80 MM HG: ICD-10-PCS | Mod: CPTII,,, | Performed by: OBSTETRICS & GYNECOLOGY

## 2022-03-23 PROCEDURE — 3008F BODY MASS INDEX DOCD: CPT | Mod: CPTII,,, | Performed by: OBSTETRICS & GYNECOLOGY

## 2022-03-23 PROCEDURE — 99024 PR POST-OP FOLLOW-UP VISIT: ICD-10-PCS | Mod: ,,, | Performed by: OBSTETRICS & GYNECOLOGY

## 2022-03-23 PROCEDURE — 3078F DIAST BP <80 MM HG: CPT | Mod: CPTII,,, | Performed by: OBSTETRICS & GYNECOLOGY

## 2022-03-23 NOTE — PROGRESS NOTES
"The patient presents for postop visit.  Surgery was completed on 3/10/2022  Diagnosis: menorrhagia  Postoperative Diagnosis: same  Procedure: hysteroscopy, D&C, endometrial ablation  Type of Anesthesia: general   Surgeon: Royce Ruiz MD  Assistant Surgeon: shannon barnes  Specimen/Tissue Removed: endometrial currettings      The patient has no complaints today. Did have some discharge after the surgery. Took antibiotics. No fever. No chills. No diarrhea.       ROS: per HPI     PE:   Vitals: /77   Ht 5' 4" (1.626 m)   Wt 63.7 kg (140 lb 6.9 oz)   LMP 02/22/2022 (Approximate)   BMI 24.11 kg/m²   APPEARANCE: Well nourished, well developed, in no acute distress.  PELVIC: Normal external female genitalia without lesions. Normal hair distribution. Adequate perineal body, normal urethral meatus. Vagina moist and well rugated without lesions or discharge. Cervix pink and without lesions. No significant cystocele or rectocele. Bimanual exam deferred      A/P:   1) Postop:  -patient healing well from procedure/surgery  -continue pain meds as needed   -benign pathology discussed with the patient - copy of pathology results discussed with the patient     F/u in nov 2022 for annual exam    keyon ruiz MD    "

## 2022-12-21 ENCOUNTER — PATIENT MESSAGE (OUTPATIENT)
Dept: OBSTETRICS AND GYNECOLOGY | Facility: CLINIC | Age: 42
End: 2022-12-21
Payer: MEDICAID

## 2022-12-21 DIAGNOSIS — B96.89 BV (BACTERIAL VAGINOSIS): Primary | ICD-10-CM

## 2022-12-21 DIAGNOSIS — N76.0 BV (BACTERIAL VAGINOSIS): Primary | ICD-10-CM

## 2022-12-21 RX ORDER — METRONIDAZOLE 7.5 MG/G
1 GEL VAGINAL NIGHTLY
Qty: 70 G | Refills: 12 | Status: SHIPPED | OUTPATIENT
Start: 2022-12-21 | End: 2022-12-26

## 2023-01-17 ENCOUNTER — LAB VISIT (OUTPATIENT)
Dept: LAB | Facility: HOSPITAL | Age: 43
End: 2023-01-17
Attending: OBSTETRICS & GYNECOLOGY
Payer: MEDICAID

## 2023-01-17 ENCOUNTER — OFFICE VISIT (OUTPATIENT)
Dept: OBSTETRICS AND GYNECOLOGY | Facility: CLINIC | Age: 43
End: 2023-01-17
Payer: MEDICAID

## 2023-01-17 VITALS
BODY MASS INDEX: 24.57 KG/M2 | SYSTOLIC BLOOD PRESSURE: 123 MMHG | WEIGHT: 143.94 LBS | DIASTOLIC BLOOD PRESSURE: 81 MMHG | HEIGHT: 64 IN

## 2023-01-17 DIAGNOSIS — Z12.4 CERVICAL CANCER SCREENING: ICD-10-CM

## 2023-01-17 DIAGNOSIS — Z11.3 SCREENING EXAMINATION FOR STD (SEXUALLY TRANSMITTED DISEASE): ICD-10-CM

## 2023-01-17 DIAGNOSIS — N76.0 ACUTE VAGINITIS: ICD-10-CM

## 2023-01-17 DIAGNOSIS — Z01.419 ROUTINE GYNECOLOGICAL EXAMINATION: Primary | ICD-10-CM

## 2023-01-17 DIAGNOSIS — Z12.31 VISIT FOR SCREENING MAMMOGRAM: ICD-10-CM

## 2023-01-17 DIAGNOSIS — Z01.419 ROUTINE GYNECOLOGICAL EXAMINATION: ICD-10-CM

## 2023-01-17 LAB — HIV 1+2 AB+HIV1 P24 AG SERPL QL IA: NORMAL

## 2023-01-17 PROCEDURE — 87625 HPV TYPES 16 & 18 ONLY: CPT | Mod: 59 | Performed by: OBSTETRICS & GYNECOLOGY

## 2023-01-17 PROCEDURE — 87389 HIV-1 AG W/HIV-1&-2 AB AG IA: CPT | Performed by: OBSTETRICS & GYNECOLOGY

## 2023-01-17 PROCEDURE — 3074F PR MOST RECENT SYSTOLIC BLOOD PRESSURE < 130 MM HG: ICD-10-PCS | Mod: CPTII,,, | Performed by: OBSTETRICS & GYNECOLOGY

## 2023-01-17 PROCEDURE — 1159F PR MEDICATION LIST DOCUMENTED IN MEDICAL RECORD: ICD-10-PCS | Mod: CPTII,,, | Performed by: OBSTETRICS & GYNECOLOGY

## 2023-01-17 PROCEDURE — 3079F PR MOST RECENT DIASTOLIC BLOOD PRESSURE 80-89 MM HG: ICD-10-PCS | Mod: CPTII,,, | Performed by: OBSTETRICS & GYNECOLOGY

## 2023-01-17 PROCEDURE — 3079F DIAST BP 80-89 MM HG: CPT | Mod: CPTII,,, | Performed by: OBSTETRICS & GYNECOLOGY

## 2023-01-17 PROCEDURE — 3008F BODY MASS INDEX DOCD: CPT | Mod: CPTII,,, | Performed by: OBSTETRICS & GYNECOLOGY

## 2023-01-17 PROCEDURE — 87624 HPV HI-RISK TYP POOLED RSLT: CPT | Performed by: OBSTETRICS & GYNECOLOGY

## 2023-01-17 PROCEDURE — 1159F MED LIST DOCD IN RCRD: CPT | Mod: CPTII,,, | Performed by: OBSTETRICS & GYNECOLOGY

## 2023-01-17 PROCEDURE — 99396 PREV VISIT EST AGE 40-64: CPT | Mod: S$PBB,,, | Performed by: OBSTETRICS & GYNECOLOGY

## 2023-01-17 PROCEDURE — 99213 OFFICE O/P EST LOW 20 MIN: CPT | Mod: PBBFAC,PO | Performed by: OBSTETRICS & GYNECOLOGY

## 2023-01-17 PROCEDURE — 3008F PR BODY MASS INDEX (BMI) DOCUMENTED: ICD-10-PCS | Mod: CPTII,,, | Performed by: OBSTETRICS & GYNECOLOGY

## 2023-01-17 PROCEDURE — 87591 N.GONORRHOEAE DNA AMP PROB: CPT | Performed by: OBSTETRICS & GYNECOLOGY

## 2023-01-17 PROCEDURE — 3074F SYST BP LT 130 MM HG: CPT | Mod: CPTII,,, | Performed by: OBSTETRICS & GYNECOLOGY

## 2023-01-17 PROCEDURE — 88175 CYTOPATH C/V AUTO FLUID REDO: CPT | Performed by: OBSTETRICS & GYNECOLOGY

## 2023-01-17 PROCEDURE — 99999 PR PBB SHADOW E&M-EST. PATIENT-LVL III: ICD-10-PCS | Mod: PBBFAC,,, | Performed by: OBSTETRICS & GYNECOLOGY

## 2023-01-17 PROCEDURE — 99999 PR PBB SHADOW E&M-EST. PATIENT-LVL III: CPT | Mod: PBBFAC,,, | Performed by: OBSTETRICS & GYNECOLOGY

## 2023-01-17 PROCEDURE — 36415 COLL VENOUS BLD VENIPUNCTURE: CPT | Performed by: OBSTETRICS & GYNECOLOGY

## 2023-01-17 PROCEDURE — 99396 PR PREVENTIVE VISIT,EST,40-64: ICD-10-PCS | Mod: S$PBB,,, | Performed by: OBSTETRICS & GYNECOLOGY

## 2023-01-17 RX ORDER — CYCLOBENZAPRINE HCL 10 MG
10 TABLET ORAL
COMMUNITY
Start: 2022-10-19 | End: 2023-03-28

## 2023-01-17 RX ORDER — CLINDAMYCIN HYDROCHLORIDE 300 MG/1
300 CAPSULE ORAL EVERY 8 HOURS
Qty: 30 CAPSULE | Refills: 12 | Status: SHIPPED | OUTPATIENT
Start: 2023-01-17 | End: 2023-01-27

## 2023-01-17 RX ORDER — METRONIDAZOLE 500 MG/1
TABLET ORAL
Qty: 30 TABLET | Refills: 4 | Status: SHIPPED | OUTPATIENT
Start: 2023-01-17 | End: 2023-03-28

## 2023-01-17 NOTE — PROGRESS NOTES
"  The patient presents for routine gyn visit.  Reports that she continues to have BV on and off.  Reports new partner since last visit here.  Patient with h/o menorrhagia and s/p endometrial ablation on 3/10/2022.  Reports no cycles since surgery.  Might have spotting every now and then.  No other gyn complaints today      ROS:  GENERAL: Denies weight gain or weight loss. Feeling well overall.   SKIN: Denies rash or lesions.   HEAD: Denies head injury or headache.   CHEST: Denies chest pain or shortness of breath.   CARDIOVASCULAR: Denies palpitations or left sided chest pain.   ABDOMEN: No abdominal pain, constipation, diarrhea, nausea, vomiting or rectal bleeding.   URINARY: No frequency, dysuria, hematuria, or burning on urination.  REPRODUCTIVE: See HPI.   BREASTS:denies pain, lumps, or nipple discharge.   HEMATOLOGIC: No easy bruisability or excessive bleeding.   MUSCULOSKELETAL: Denies joint pain or swelling.   NEUROLOGIC: Denies syncope or weakness.   PSYCHIATRIC: Denies depression, anxiety or mood swings.             PE:   Vitals: /81   Ht 5' 4" (1.626 m)   Wt 65.3 kg (143 lb 15.4 oz)   BMI 24.71 kg/m²   APPEARANCE: Well nourished, well developed, in no acute distress.  SKIN: Normal skin turgor, no lesions.  CHEST: Lungs clear to auscultation.  HEART: Regular rate and rhythm, no murmurs, rubs or gallops.  ABDOMEN: Soft. No tenderness or masses. No hepatosplenomegaly. No hernias.  BREASTS: Symmetrical, no skin changes or visible lesions. No palpable masses, nipple discharge or adenopathy bilaterally.  PELVIC: Normal external female genitalia without lesions. Normal hair distribution. Adequate perineal body, normal urethral meatus. Vagina moist and well rugated without lesions, min white discharge. Cervix pink and without lesions. No significant cystocele or rectocele. Bimanual exam showed uterus normal size, shape, position, mobile and nontender. Adnexa without masses or tenderness. Urethra and " "bladder normal.    AP  Routine gyn  -s/p normal breast exam: mammogram ordered  -s/p normal pelvic exam:   -Pap and HPV: collected  -STD testing: gc/chl and HIV  -BV: rx for flagyl/clindamycin/metrogel  -contraception: s/p BTL      KEYON Ruiz MD          Diagnosis: menorrhagia  Postoperative Diagnosis: same  Procedure: hysteroscopy, D&C, endometrial ablation  Type of Anesthesia: general   Surgeon: Royce Ruiz MD  Assistant Surgeon: shannon barnes  Specimen/Tissue Removed: endometrial currettings      The patient has no complaints today. Did have some discharge after the surgery. Took antibiotics. No fever. No chills. No diarrhea.       ROS: per HPI     PE:   Vitals: /81   Ht 5' 4" (1.626 m)   Wt 65.3 kg (143 lb 15.4 oz)   BMI 24.71 kg/m²   APPEARANCE: Well nourished, well developed, in no acute distress.  PELVIC: Normal external female genitalia without lesions. Normal hair distribution. Adequate perineal body, normal urethral meatus. Vagina moist and well rugated without lesions or discharge. Cervix pink and without lesions. No significant cystocele or rectocele. Bimanual exam deferred      A/P:   1) Postop:  -patient healing well from procedure/surgery  -continue pain meds as needed   -benign pathology discussed with the patient - copy of pathology results discussed with the patient     F/u in nov 2022 for annual exam    keyon ruiz MD    "

## 2023-01-19 LAB
C TRACH DNA SPEC QL NAA+PROBE: NOT DETECTED
N GONORRHOEA DNA SPEC QL NAA+PROBE: NOT DETECTED

## 2023-01-26 LAB
CLINICAL INFO: NORMAL
CYTO CVX: NORMAL
CYTOLOGIST CVX/VAG CYTO: NORMAL
CYTOLOGIST CVX/VAG CYTO: NORMAL
CYTOLOGY CMNT CVX/VAG CYTO-IMP: NORMAL
CYTOLOGY PAP THIN PREP EXPLANATION: NORMAL
DATE OF PREVIOUS PAP: NORMAL
DATE PREVIOUS BX: NO
GEN CATEG CVX/VAG CYTO-IMP: NORMAL
HPV I/H RISK 4 DNA CVX QL NAA+PROBE: DETECTED
HPV16 DNA CVX QL PROBE+SIG AMP: NORMAL
HPV18 DNA CVX QL PROBE+SIG AMP: NORMAL
LMP START DATE: NORMAL
MICROORGANISM CVX/VAG CYTO: NORMAL
PATHOLOGIST CVX/VAG CYTO: NORMAL
SERVICE CMNT-IMP: NORMAL
SPECIMEN SOURCE CVX/VAG CYTO: NORMAL
STAT OF ADQ CVX/VAG CYTO-IMP: NORMAL

## 2023-01-30 ENCOUNTER — TELEPHONE (OUTPATIENT)
Dept: OBSTETRICS AND GYNECOLOGY | Facility: CLINIC | Age: 43
End: 2023-01-30
Payer: MEDICAID

## 2023-01-30 NOTE — TELEPHONE ENCOUNTER
----- Message from Royce Ruiz MD sent at 1/27/2023 12:29 PM CST -----  Please send a letter with the following information. Please enclose a copy of the patient's Pap smear result and her HPV results in this letter.    You presented recently to our office for your well woman exam. A Pap smear was collected.  The results show that you had a normal Pap smear.  But, we also tested for a virus called the human papilloma virus (HPV).  Your results were POSITIVE for this virus - which means that you presently have an infection with this virus. Your immune system may fight off this virus in the upcoming year.  There is no need to panic. You do NOT have cervical cancer!      My recommendation is to repeat your pap smear in one year.      Your next well woman exam and pelvic exam is due in one year.    Enclosed is a copy of your pap smear results for your records.      Sincerely,        Royce Ruiz

## 2023-02-06 ENCOUNTER — HOSPITAL ENCOUNTER (OUTPATIENT)
Dept: RADIOLOGY | Facility: HOSPITAL | Age: 43
Discharge: HOME | End: 2023-02-06
Attending: OBSTETRICS & GYNECOLOGY
Payer: MEDICAID

## 2023-02-06 DIAGNOSIS — Z12.31 VISIT FOR SCREENING MAMMOGRAM: ICD-10-CM

## 2023-02-06 PROCEDURE — 77067 MAMMO DIGITAL SCREENING BILAT WITH TOMO: ICD-10-PCS | Mod: 26,,, | Performed by: RADIOLOGY

## 2023-02-06 PROCEDURE — 77063 BREAST TOMOSYNTHESIS BI: CPT | Mod: 26,,, | Performed by: RADIOLOGY

## 2023-02-06 PROCEDURE — 77063 MAMMO DIGITAL SCREENING BILAT WITH TOMO: ICD-10-PCS | Mod: 26,,, | Performed by: RADIOLOGY

## 2023-02-06 PROCEDURE — 77067 SCR MAMMO BI INCL CAD: CPT | Mod: 26,,, | Performed by: RADIOLOGY

## 2023-02-06 PROCEDURE — 77067 SCR MAMMO BI INCL CAD: CPT | Mod: TC

## 2023-02-13 ENCOUNTER — PATIENT MESSAGE (OUTPATIENT)
Dept: OBSTETRICS AND GYNECOLOGY | Facility: CLINIC | Age: 43
End: 2023-02-13
Payer: MEDICAID

## 2023-02-14 ENCOUNTER — OFFICE VISIT (OUTPATIENT)
Dept: OBSTETRICS AND GYNECOLOGY | Facility: CLINIC | Age: 43
End: 2023-02-14
Payer: MEDICAID

## 2023-02-14 VITALS
DIASTOLIC BLOOD PRESSURE: 75 MMHG | BODY MASS INDEX: 24.05 KG/M2 | WEIGHT: 140.88 LBS | HEIGHT: 64 IN | SYSTOLIC BLOOD PRESSURE: 112 MMHG

## 2023-02-14 DIAGNOSIS — R10.32 LLQ PAIN: Primary | ICD-10-CM

## 2023-02-14 PROCEDURE — 99999 PR PBB SHADOW E&M-EST. PATIENT-LVL III: ICD-10-PCS | Mod: PBBFAC,,, | Performed by: OBSTETRICS & GYNECOLOGY

## 2023-02-14 PROCEDURE — 99213 OFFICE O/P EST LOW 20 MIN: CPT | Mod: PBBFAC,PO | Performed by: OBSTETRICS & GYNECOLOGY

## 2023-02-14 PROCEDURE — 1159F PR MEDICATION LIST DOCUMENTED IN MEDICAL RECORD: ICD-10-PCS | Mod: CPTII,,, | Performed by: OBSTETRICS & GYNECOLOGY

## 2023-02-14 PROCEDURE — 99212 OFFICE O/P EST SF 10 MIN: CPT | Mod: S$PBB,,, | Performed by: OBSTETRICS & GYNECOLOGY

## 2023-02-14 PROCEDURE — 3074F SYST BP LT 130 MM HG: CPT | Mod: CPTII,,, | Performed by: OBSTETRICS & GYNECOLOGY

## 2023-02-14 PROCEDURE — 3008F PR BODY MASS INDEX (BMI) DOCUMENTED: ICD-10-PCS | Mod: CPTII,,, | Performed by: OBSTETRICS & GYNECOLOGY

## 2023-02-14 PROCEDURE — 3078F PR MOST RECENT DIASTOLIC BLOOD PRESSURE < 80 MM HG: ICD-10-PCS | Mod: CPTII,,, | Performed by: OBSTETRICS & GYNECOLOGY

## 2023-02-14 PROCEDURE — 87086 URINE CULTURE/COLONY COUNT: CPT | Performed by: OBSTETRICS & GYNECOLOGY

## 2023-02-14 PROCEDURE — 3078F DIAST BP <80 MM HG: CPT | Mod: CPTII,,, | Performed by: OBSTETRICS & GYNECOLOGY

## 2023-02-14 PROCEDURE — 81514 NFCT DS BV&VAGINITIS DNA ALG: CPT | Performed by: OBSTETRICS & GYNECOLOGY

## 2023-02-14 PROCEDURE — 99999 PR PBB SHADOW E&M-EST. PATIENT-LVL III: CPT | Mod: PBBFAC,,, | Performed by: OBSTETRICS & GYNECOLOGY

## 2023-02-14 PROCEDURE — 99212 PR OFFICE/OUTPT VISIT, EST, LEVL II, 10-19 MIN: ICD-10-PCS | Mod: S$PBB,,, | Performed by: OBSTETRICS & GYNECOLOGY

## 2023-02-14 PROCEDURE — 1159F MED LIST DOCD IN RCRD: CPT | Mod: CPTII,,, | Performed by: OBSTETRICS & GYNECOLOGY

## 2023-02-14 PROCEDURE — 3008F BODY MASS INDEX DOCD: CPT | Mod: CPTII,,, | Performed by: OBSTETRICS & GYNECOLOGY

## 2023-02-14 PROCEDURE — 3074F PR MOST RECENT SYSTOLIC BLOOD PRESSURE < 130 MM HG: ICD-10-PCS | Mod: CPTII,,, | Performed by: OBSTETRICS & GYNECOLOGY

## 2023-02-14 NOTE — PROGRESS NOTES
"  41 yo female s/p endometrial ablation with LEFT ovarian pain x 3 days.  Pain 7/10 on pain scale. Has tried OTC motrin and this helped her pain.  Pain radiates to her LEFT flank.  Over a year ago, patient had ultrasound completed last year.  Recommendation was to repeat US when she is NOT on her cycle to evaluate her ovaries agin.  Patient with h/o chronic BV.  Concerned about having infection now.    ROS: per HPI    PE  /75   Ht 5' 4" (1.626 m)   Wt 63.9 kg (140 lb 14 oz)   BMI 24.18 kg/m²   Exam: deferred    AP  Left sided pain  -- urine cx sent  --pelvic US ordered  --affirm marlene haider MD  "

## 2023-02-16 ENCOUNTER — HOSPITAL ENCOUNTER (OUTPATIENT)
Dept: RADIOLOGY | Facility: OTHER | Age: 43
Discharge: HOME | End: 2023-02-16
Attending: OBSTETRICS & GYNECOLOGY
Payer: MEDICAID

## 2023-02-16 DIAGNOSIS — R10.32 LLQ PAIN: ICD-10-CM

## 2023-02-16 LAB
BACTERIA UR CULT: NO GROWTH
BACTERIAL VAGINOSIS DNA: NEGATIVE
CANDIDA GLABRATA DNA: NEGATIVE
CANDIDA KRUSEI DNA: NEGATIVE
CANDIDA RRNA VAG QL PROBE: NEGATIVE
T VAGINALIS RRNA GENITAL QL PROBE: NEGATIVE

## 2023-02-16 PROCEDURE — 76856 US EXAM PELVIC COMPLETE: CPT | Mod: TC

## 2023-02-16 PROCEDURE — 76856 US EXAM PELVIC COMPLETE: CPT | Mod: 26,,, | Performed by: RADIOLOGY

## 2023-02-16 PROCEDURE — 76856 US PELVIS COMP WITH TRANSVAG NON-OB (XPD): ICD-10-PCS | Mod: 26,,, | Performed by: RADIOLOGY

## 2023-02-16 PROCEDURE — 76830 TRANSVAGINAL US NON-OB: CPT | Mod: 26,,, | Performed by: RADIOLOGY

## 2023-02-16 PROCEDURE — 76830 US PELVIS COMP WITH TRANSVAG NON-OB (XPD): ICD-10-PCS | Mod: 26,,, | Performed by: RADIOLOGY

## 2023-02-20 ENCOUNTER — PATIENT MESSAGE (OUTPATIENT)
Dept: OBSTETRICS AND GYNECOLOGY | Facility: CLINIC | Age: 43
End: 2023-02-20
Payer: MEDICAID

## 2023-03-28 ENCOUNTER — OFFICE VISIT (OUTPATIENT)
Dept: FAMILY MEDICINE | Facility: HOSPITAL | Age: 43
End: 2023-03-28
Payer: MEDICAID

## 2023-03-28 VITALS
WEIGHT: 137.56 LBS | BODY MASS INDEX: 23.49 KG/M2 | HEART RATE: 78 BPM | SYSTOLIC BLOOD PRESSURE: 111 MMHG | HEIGHT: 64 IN | DIASTOLIC BLOOD PRESSURE: 78 MMHG

## 2023-03-28 DIAGNOSIS — R10.9 FLANK PAIN: Primary | ICD-10-CM

## 2023-03-28 PROCEDURE — 99213 OFFICE O/P EST LOW 20 MIN: CPT | Performed by: STUDENT IN AN ORGANIZED HEALTH CARE EDUCATION/TRAINING PROGRAM

## 2023-03-28 RX ORDER — NAPROXEN SODIUM 550 MG/1
550 TABLET ORAL
COMMUNITY
Start: 2023-03-19 | End: 2023-04-18

## 2023-03-28 RX ORDER — METHOCARBAMOL 500 MG/1
500 TABLET, FILM COATED ORAL 4 TIMES DAILY
Qty: 40 TABLET | Refills: 0 | Status: SHIPPED | OUTPATIENT
Start: 2023-03-28 | End: 2023-04-07

## 2023-03-28 NOTE — PROGRESS NOTES
History & Physical  [facility]    Subjective:      Patient ID: Kenya Charles is a 42 y.o. female    Chief Complaint: Establish Care and abdominal/pelvic pain (LEFT)    Kenya Charles is a 42-year-old female with a PMHx menorrhalgia presenting to establish care with a PCP and with complaint of left lower quadrant abdominal pain. Pain has been occurring now for the past 3 years in duration. Endorses pain will radiate to left flank and left lower back. Pain will occurs all the time. Pain is exacerbated when she is constipated.  Patient also endorses increased urinary frequency but denies any dysuria, fever/chills, nausea, vomiting, or bloody stools. Patient with recent ED visit to [facility] on 3/19/23 for left-sided flank pain. Patient has previously been worked up by her gynecologist with ultrasound but workup has been negative except for a small 9 mm uterine leiomyoma on the left side. CT of abdomen ordered at  showing no acute abnormality in the abdomen or pelvis.         Health Maintenance         Date Due Completion Date    Hepatitis C Screening Never done ---    COVID-19 Vaccine (1) Never done ---    TETANUS VACCINE Never done ---    Influenza Vaccine (1) Never done ---    Mammogram 2024    Cervical Cancer Screening 2028            Past Medical History:   Diagnosis Date    PONV (postoperative nausea and vomiting)        Past Surgical History:   Procedure Laterality Date     SECTION      ENDOMETRIAL ABLATION N/A 3/10/2022    Procedure: ABLATION, ENDOMETRIUM;  Surgeon: Royce Ruiz MD;  Location: [facility] OR;  Service: OB/GYN;  Laterality: N/A;    HYSTEROSCOPY WITH DILATION AND CURETTAGE OF UTERUS N/A 3/10/2022    Procedure: HYSTEROSCOPY, WITH DILATION AND CURETTAGE OF UTERUS;  Surgeon: Royce Ruiz MD;  Location: [facility] OR;  Service: OB/GYN;  Laterality: N/A;    TUBAL LIGATION         Family History   Problem Relation Age of Onset     Hypertension Mother     Cervical cancer Maternal Aunt     Ovarian cancer Maternal Aunt     Cancer Paternal Aunt     Breast cancer Maternal Grandmother        Social History     Socioeconomic History    Marital status:    Tobacco Use    Smoking status: Never    Smokeless tobacco: Never   Substance and Sexual Activity    Alcohol use: No    Drug use: Never    Sexual activity: Yes     Partners: Male     Birth control/protection: See Surgical Hx       Current Outpatient Medications   Medication Sig Dispense Refill    acetaminophen (TYLENOL) suppository Place 1 suppository (325 mg total) rectally every 6 (six) hours as needed for Pain. (Patient not taking: Reported on 5/27/2020)  0    clindamycin (CLEOCIN) 300 MG capsule TAKE 1 CAPSULE (300 MG TOTAL) BY MOUTH EVERY 8 (EIGHT) HOURS. FOR 10 DAYS (Patient not taking: Reported on 3/28/2023) 30 capsule 11    ibuprofen (ADVIL,MOTRIN) 600 MG tablet Take 1 tablet (600 mg total) by mouth every 8 (eight) hours as needed for Pain. (Patient not taking: Reported on 3/23/2022) 45 tablet 1    methocarbamoL (ROBAXIN) 500 MG Tab Take 1 tablet (500 mg total) by mouth 4 (four) times daily. for 10 days 40 tablet 0    metroNIDAZOLE (FLAGYL) 500 MG tablet Take 1 tablet by mouth twice a day for 7 days with vaginal infections (Patient not taking: Reported on 3/28/2023) 30 tablet 4     No current facility-administered medications for this visit.       Review of patient's allergies indicates:   Allergen Reactions    Roxicet [oxycodone-acetaminophen] Itching and Edema     Starting taking Roxicet Friday.  Starting experieincing facial swelling and general itching Saturday.    Acetaminophen Other (See Comments)     hallucinations       Review of Systems   Constitutional:  Negative for chills and fever.   Eyes:  Negative for visual disturbance.   Respiratory:  Negative for shortness of breath.    Cardiovascular:  Negative for chest pain.   Gastrointestinal:  Positive for abdominal pain and  constipation. Negative for blood in stool, diarrhea, nausea and vomiting.   Genitourinary:  Positive for flank pain and frequency. Negative for difficulty urinating and dysuria.   Musculoskeletal:  Positive for back pain. Negative for arthralgias and myalgias.   Skin:  Negative for rash.   Psychiatric/Behavioral:  Negative for dysphoric mood and sleep disturbance. The patient is not nervous/anxious.       Objective:      Vitals:    03/28/23 0926   BP: 111/78   Pulse: 78     BP Readings from Last 3 Encounters:   03/28/23 111/78   02/14/23 112/75   01/17/23 123/81       Physical Exam  Vitals reviewed.   Constitutional:       Appearance: Normal appearance.   HENT:      Head: Normocephalic and atraumatic.   Eyes:      Pupils: Pupils are equal, round, and reactive to light.   Cardiovascular:      Rate and Rhythm: Normal rate and regular rhythm.   Pulmonary:      Effort: Pulmonary effort is normal.      Breath sounds: Normal breath sounds.   Abdominal:      General: Abdomen is flat.      Palpations: Abdomen is soft.      Tenderness: There is no abdominal tenderness. There is no right CVA tenderness, left CVA tenderness or guarding.   Musculoskeletal:         General: Normal range of motion.      Right lower leg: No edema.      Left lower leg: No edema.      Comments: Negative straight leg raise bilaterally   Skin:     General: Skin is warm.      Findings: No rash.   Neurological:      Mental Status: She is alert and oriented to person, place, and time.   Psychiatric:         Mood and Affect: Mood normal.         Behavior: Behavior normal.       Assessment:     1. Flank pain, left         Plan:   Flank pain, left  -     Comprehensive Metabolic Panel; Future; Expected date: 07/26/2023  -     CBC Auto Differential; Future; Expected date: 03/28/2023  -     Urinalysis, Reflex to Urine Culture Urine, Clean Catch; Future; Expected date: 03/28/2023  -     Lipase; Future; Expected date: 03/28/2023  -     methocarbamoL (ROBAXIN)  500 MG Tab; Take 1 tablet (500 mg total) by mouth 4 (four) times daily. for 10 days  Dispense: 40 tablet; Refill: 0    Physical exam with no abdominal tenderness, no CVA tenderness, and negative straight leg raise bilaterally. Etiology likely musculoskeletal in etiology vs chronic constipation vs acute cystitis/pyelonephritis vs uterine leiomyoma. Patient with previous tubal ligation and recent Beta hCG negative 9 days ago. Will order additional labs and try course of Robaxin.    Follow-up in 2-3 months and as needed    Bienvenido Ventura DO  [facility], PGY-2  03/28/2023      This note was partially created using [employer] Voice Recognition software. Typographical and content errors may occur with this process. While efforts are made to detect and correct such errors, in some cases errors will persist. For this reason, wording in this document should be considered in the proper context and not strictly verbatim

## 2023-03-29 ENCOUNTER — PATIENT MESSAGE (OUTPATIENT)
Dept: FAMILY MEDICINE | Facility: HOSPITAL | Age: 43
End: 2023-03-29
Payer: MEDICAID

## 2023-03-29 NOTE — PROGRESS NOTES
I assume primary medical responsibility for this patient. I have reviewed the history, physical, and assessment & treatment plan with the resident and agree that the care is reasonable and necessary. This service has been performed by a resident without the presence of a teaching physician under the primary care exception. If necessary, an addendum of additional findings or evaluation beyond the resident documentation will be noted below.        Bill Renteria Jr., DO    [facility]

## 2023-03-30 DIAGNOSIS — K76.9 HEPATIC LESION: Primary | ICD-10-CM

## 2023-03-30 RX ORDER — METRONIDAZOLE 7.5 MG/G
GEL VAGINAL
COMMUNITY
Start: 2023-03-30

## 2023-03-30 RX ORDER — IBUPROFEN 800 MG/1
800 TABLET ORAL 3 TIMES DAILY
COMMUNITY
Start: 2023-03-30 | End: 2023-04-18

## 2023-03-30 RX ORDER — CLINDAMYCIN HYDROCHLORIDE 300 MG/1
300 CAPSULE ORAL 3 TIMES DAILY
COMMUNITY
Start: 2023-03-30

## 2023-04-17 ENCOUNTER — HOSPITAL ENCOUNTER (OUTPATIENT)
Dept: RADIOLOGY | Facility: HOSPITAL | Age: 43
Discharge: HOME | End: 2023-04-17
Attending: STUDENT IN AN ORGANIZED HEALTH CARE EDUCATION/TRAINING PROGRAM
Payer: MEDICAID

## 2023-04-17 DIAGNOSIS — K76.9 HEPATIC LESION: ICD-10-CM

## 2023-04-17 PROCEDURE — 25500020 PHARM REV CODE 255: Performed by: STUDENT IN AN ORGANIZED HEALTH CARE EDUCATION/TRAINING PROGRAM

## 2023-04-17 PROCEDURE — 74183 MRI ABD W/O CNTR FLWD CNTR: CPT | Mod: TC

## 2023-04-17 PROCEDURE — 74183 MRI ABDOMEN W WO CONTRAST: ICD-10-PCS | Mod: 26,,, | Performed by: RADIOLOGY

## 2023-04-17 PROCEDURE — A9585 GADOBUTROL INJECTION: HCPCS | Performed by: STUDENT IN AN ORGANIZED HEALTH CARE EDUCATION/TRAINING PROGRAM

## 2023-04-17 PROCEDURE — 74183 MRI ABD W/O CNTR FLWD CNTR: CPT | Mod: 26,,, | Performed by: RADIOLOGY

## 2023-04-17 RX ORDER — GADOBUTROL 604.72 MG/ML
10 INJECTION INTRAVENOUS
Status: COMPLETED | OUTPATIENT
Start: 2023-04-17 | End: 2023-04-17

## 2023-04-17 RX ADMIN — GADOBUTROL 10 ML: 604.72 INJECTION INTRAVENOUS at 07:04

## 2023-04-18 ENCOUNTER — TELEPHONE (OUTPATIENT)
Dept: FAMILY MEDICINE | Facility: HOSPITAL | Age: 43
End: 2023-04-18
Payer: MEDICAID

## 2023-04-18 DIAGNOSIS — R10.9 FLANK PAIN: Primary | ICD-10-CM

## 2023-04-18 DIAGNOSIS — R93.5 ABNORMAL MRI OF ABDOMEN: ICD-10-CM

## 2023-04-18 DIAGNOSIS — M54.50 LEFT-SIDED LOW BACK PAIN WITHOUT SCIATICA, UNSPECIFIED CHRONICITY: ICD-10-CM

## 2023-04-18 DIAGNOSIS — K76.9 LIVER LESION: ICD-10-CM

## 2023-04-18 RX ORDER — METHOCARBAMOL 500 MG/1
500 TABLET, FILM COATED ORAL 4 TIMES DAILY
Qty: 40 TABLET | Refills: 0 | Status: SHIPPED | OUTPATIENT
Start: 2023-04-18 | End: 2023-04-28

## 2023-04-18 RX ORDER — IBUPROFEN 600 MG/1
600 TABLET ORAL 3 TIMES DAILY
Qty: 30 TABLET | Refills: 0 | Status: SHIPPED | OUTPATIENT
Start: 2023-04-18 | End: 2023-04-28

## 2023-04-18 NOTE — TELEPHONE ENCOUNTER
Spoke to patient about MRI of abdomen, results showing a right lobe liver lesion demonstrates arterial phase diffuse enhancement and washout. Possibilities include adenoma, FNH, or HCC. This lesion will require follow-up.    Will plan to refer patient to hepatology for further evaluation.  Patient also with persistent complaint of left flank pain and left lower back pain worse with activity and improved with rest. Advised patient to use ibuprofen and Robaxin PRN and will refer patient to physical therapy for further evaluation.  Advised patient to make a follow-up appointment in 6 weeks.    Bienvenido Ventura DO  [facility], PGY-2  04/18/2023

## 2023-04-26 ENCOUNTER — PROCEDURE VISIT (OUTPATIENT)
Dept: HEPATOLOGY | Facility: CLINIC | Age: 43
End: 2023-04-26
Payer: MEDICAID

## 2023-04-26 ENCOUNTER — OFFICE VISIT (OUTPATIENT)
Dept: HEPATOLOGY | Facility: CLINIC | Age: 43
End: 2023-04-26
Payer: MEDICAID

## 2023-04-26 VITALS
RESPIRATION RATE: 18 BRPM | BODY MASS INDEX: 23.41 KG/M2 | WEIGHT: 137.13 LBS | SYSTOLIC BLOOD PRESSURE: 119 MMHG | OXYGEN SATURATION: 98 % | HEART RATE: 92 BPM | HEIGHT: 64 IN | DIASTOLIC BLOOD PRESSURE: 74 MMHG

## 2023-04-26 DIAGNOSIS — K76.9 LIVER LESION: ICD-10-CM

## 2023-04-26 DIAGNOSIS — R93.5 ABNORMAL MRI OF ABDOMEN: ICD-10-CM

## 2023-04-26 PROCEDURE — 91200 FIBROSCAN NEW ORLEANS (VIBRATION CONTROLLED TRANSIENT ELASTOGRAPHY): ICD-10-PCS | Mod: 26,S$PBB,, | Performed by: STUDENT IN AN ORGANIZED HEALTH CARE EDUCATION/TRAINING PROGRAM

## 2023-04-26 PROCEDURE — 99999 PR PBB SHADOW E&M-EST. PATIENT-LVL III: CPT | Mod: PBBFAC,,, | Performed by: STUDENT IN AN ORGANIZED HEALTH CARE EDUCATION/TRAINING PROGRAM

## 2023-04-26 PROCEDURE — 99204 OFFICE O/P NEW MOD 45 MIN: CPT | Mod: S$PBB,,, | Performed by: STUDENT IN AN ORGANIZED HEALTH CARE EDUCATION/TRAINING PROGRAM

## 2023-04-26 PROCEDURE — 1159F MED LIST DOCD IN RCRD: CPT | Mod: CPTII,,, | Performed by: STUDENT IN AN ORGANIZED HEALTH CARE EDUCATION/TRAINING PROGRAM

## 2023-04-26 PROCEDURE — 3078F DIAST BP <80 MM HG: CPT | Mod: CPTII,,, | Performed by: STUDENT IN AN ORGANIZED HEALTH CARE EDUCATION/TRAINING PROGRAM

## 2023-04-26 PROCEDURE — 3074F SYST BP LT 130 MM HG: CPT | Mod: CPTII,,, | Performed by: STUDENT IN AN ORGANIZED HEALTH CARE EDUCATION/TRAINING PROGRAM

## 2023-04-26 PROCEDURE — 3078F PR MOST RECENT DIASTOLIC BLOOD PRESSURE < 80 MM HG: ICD-10-PCS | Mod: CPTII,,, | Performed by: STUDENT IN AN ORGANIZED HEALTH CARE EDUCATION/TRAINING PROGRAM

## 2023-04-26 PROCEDURE — 99999 PR PBB SHADOW E&M-EST. PATIENT-LVL III: ICD-10-PCS | Mod: PBBFAC,,, | Performed by: STUDENT IN AN ORGANIZED HEALTH CARE EDUCATION/TRAINING PROGRAM

## 2023-04-26 PROCEDURE — 3074F PR MOST RECENT SYSTOLIC BLOOD PRESSURE < 130 MM HG: ICD-10-PCS | Mod: CPTII,,, | Performed by: STUDENT IN AN ORGANIZED HEALTH CARE EDUCATION/TRAINING PROGRAM

## 2023-04-26 PROCEDURE — 99204 PR OFFICE/OUTPT VISIT, NEW, LEVL IV, 45-59 MIN: ICD-10-PCS | Mod: S$PBB,,, | Performed by: STUDENT IN AN ORGANIZED HEALTH CARE EDUCATION/TRAINING PROGRAM

## 2023-04-26 PROCEDURE — 3008F BODY MASS INDEX DOCD: CPT | Mod: CPTII,,, | Performed by: STUDENT IN AN ORGANIZED HEALTH CARE EDUCATION/TRAINING PROGRAM

## 2023-04-26 PROCEDURE — 3008F PR BODY MASS INDEX (BMI) DOCUMENTED: ICD-10-PCS | Mod: CPTII,,, | Performed by: STUDENT IN AN ORGANIZED HEALTH CARE EDUCATION/TRAINING PROGRAM

## 2023-04-26 PROCEDURE — 99213 OFFICE O/P EST LOW 20 MIN: CPT | Mod: PBBFAC | Performed by: STUDENT IN AN ORGANIZED HEALTH CARE EDUCATION/TRAINING PROGRAM

## 2023-04-26 PROCEDURE — 1159F PR MEDICATION LIST DOCUMENTED IN MEDICAL RECORD: ICD-10-PCS | Mod: CPTII,,, | Performed by: STUDENT IN AN ORGANIZED HEALTH CARE EDUCATION/TRAINING PROGRAM

## 2023-04-26 PROCEDURE — 91200 LIVER ELASTOGRAPHY: CPT | Mod: PBBFAC | Performed by: STUDENT IN AN ORGANIZED HEALTH CARE EDUCATION/TRAINING PROGRAM

## 2023-04-26 NOTE — PROGRESS NOTES
Hepatology Consult Note    Referring provider: Dr. Bienvenido Ventura  PCP: Primary Doctor No    Chief complaint: liver lesions    HPI:  Kenya Charles is a 42 y.o. female who was referred to Hepatology Clinic for liver lesions.    She underwent noncontrasted CT in 2023 for flank pain that incidentally showed 3 liver lesions measuring up to 2.3 cm.  Subsequent MRI in 2023 showed a 1.8 cm liver lesion in the hepatic dome consistent with a hemangioma as well as 2.5 cm lesion in the right hepatic lobe with enhancement and washout.    Prior to this she had never been told that she had any liver issues.  No known family history of liver disease. She denies signs of decompensated liver disease including no recent abdominal distension, encephalopathy, jaundice, GI bleeding.    Past Medical History:   Diagnosis Date    PONV (postoperative nausea and vomiting)        Past Surgical History:   Procedure Laterality Date     SECTION      ENDOMETRIAL ABLATION N/A 3/10/2022    Procedure: ABLATION, ENDOMETRIUM;  Surgeon: Royce Ruiz MD;  Location: [facility] OR;  Service: OB/GYN;  Laterality: N/A;    HYSTEROSCOPY WITH DILATION AND CURETTAGE OF UTERUS N/A 3/10/2022    Procedure: HYSTEROSCOPY, WITH DILATION AND CURETTAGE OF UTERUS;  Surgeon: Royce Ruiz MD;  Location: [facility] OR;  Service: OB/GYN;  Laterality: N/A;    TUBAL LIGATION         Family History   Problem Relation Age of Onset    Hypertension Mother     Cervical cancer Maternal Aunt     Ovarian cancer Maternal Aunt     Cancer Paternal Aunt     Breast cancer Maternal Grandmother        Social History     Tobacco Use    Smoking status: Never    Smokeless tobacco: Never   Substance Use Topics    Alcohol use: No    Drug use: Never       Current Outpatient Medications   Medication Sig Dispense Refill    clindamycin (CLEOCIN) 300 MG capsule Take 300 mg by mouth 3 (three) times daily.      ibuprofen (ADVIL,MOTRIN) 600 MG tablet Take 1 tablet (600 mg  "total) by mouth 3 (three) times daily. for 10 days 30 tablet 0    methocarbamoL (ROBAXIN) 500 MG Tab Take 1 tablet (500 mg total) by mouth 4 (four) times daily. for 10 days 40 tablet 0    metroNIDAZOLE (METROGEL) 0.75 % (37.5mg/5 gram) vaginal gel Place vaginally.       No current facility-administered medications for this visit.       Review of patient's allergies indicates:   Allergen Reactions    Roxicet [oxycodone-acetaminophen] Itching and Edema     Starting taking Roxicet Friday.  Starting experieincing facial swelling and general itching Saturday.    Acetaminophen Other (See Comments)     hallucinations            Vitals:    04/26/23 1313   BP: 119/74   Pulse: 92   Resp: 18   SpO2: 98%   Weight: 62.2 kg (137 lb 2 oz)   Height: 5' 4" (1.626 m)       Physical Exam    LABS: I personally reviewed pertinent laboratory findings.    Lab Results   Component Value Date    WBC 6.15 03/28/2023    HGB 15.0 03/28/2023    HCT 44.9 03/28/2023    MCV 91 03/28/2023     03/28/2023       Lab Results   Component Value Date     03/28/2023    K 4.9 03/28/2023     03/28/2023    CO2 25 03/28/2023    BUN 14 03/28/2023    CREATININE 0.8 03/28/2023    CALCIUM 10.0 03/28/2023    ANIONGAP 9 03/28/2023    ESTGFRAFRICA >60 11/06/2019    EGFRNONAA >60 11/06/2019       Lab Results   Component Value Date    ALT 17 03/28/2023    AST 17 03/28/2023    ALKPHOS 73 03/28/2023    BILITOT 0.3 03/28/2023       IMAGING: I personally reviewed imaging studies.      Assessment/Recommendations:  42 y.o. female who was referred to Hepatology Clinic for liver lesions.    She underwent noncontrasted CT in March 2023 for flank pain that incidentally showed 3 liver lesions measuring up to 2.3 cm.  Subsequent MRI in April 2023 showed a 1.8 cm liver lesion in the hepatic dome consistent with a hemangioma as well as 2.5 cm lesion in the right hepatic lobe with enhancement and washout. LFTs normal 03/2023. FibroScan today shows S0 steatosis and " F0 fibrosis.    Will review imaging in multidisciplinary liver radiology conference. Further recommendations pending conference review.      I have sent communication to the referring physician and/or primary care provider.    I spent a total of 45 minutes on the day of the visit. This includes face to face time and non-face to face time preparing to see the patient (eg, review of tests), obtaining and/or reviewing separately obtained history, documenting clinical information in the electronic or other health record, independently interpreting results, and communicating results to the patient/family/caregiver, or care coordination.    This note includes dictation done using [employer] speech recognition program. Word recognition mistakes are occasionally missed on review.    Dudley Plaza MD  Staff Physician  Hepatology and Liver Transplant  [facility]  [facility] [place]

## 2023-04-27 ENCOUNTER — TELEPHONE (OUTPATIENT)
Dept: HEPATOLOGY | Facility: CLINIC | Age: 43
End: 2023-04-27
Payer: MEDICAID

## 2023-04-27 NOTE — TELEPHONE ENCOUNTER
Patient: Kenya Charles       MRN: [id number]      : 1980     Age: 42 y.o.  [address]  [place] [address]    Providers  Hepatologists: Dudley Plaza MD    Priority of review: Cancer    Patient Transplant Status: Hepatology    Reason for presentation: Reassessment    Clinical Summary: who was referred to Hepatology Clinic for liver lesions. No known underlying liver disease. She underwent noncontrasted CT in 2023 for flank pain that incidentally showed 3 liver lesions measuring up to 2.3 cm.  Subsequent MRI in 2023 showed a 1.8 cm liver lesion in the hepatic dome consistent with a hemangioma as well as 2.5 cm lesion in the right hepatic lobe with enhancement and washout.    Imaging to be reviewed: MRI 2023    HCC Treatment History: none    Platelets:   Lab Results   Component Value Date/Time     2023 10:05 AM     Creatinine:   Lab Results   Component Value Date/Time    CREATININE 0.8 2023 10:05 AM     Bilirubin:   Lab Results   Component Value Date/Time    BILITOT 0.3 2023 10:05 AM     AFP Last 3 each if available: No results found for: AFP, EXTAFP    MELD: Computed MELD-Na score unavailable. Necessary lab results were not found in the last year.  Computed MELD score unavailable. Necessary lab results were not found in the last year.    Plan:     Follow-up Provider:

## 2023-05-02 ENCOUNTER — CONFERENCE (OUTPATIENT)
Dept: TRANSPLANT | Facility: CLINIC | Age: 43
End: 2023-05-02
Payer: MEDICAID

## 2023-05-03 NOTE — TELEPHONE ENCOUNTER
Patient: Kenya Charles       MRN: [id number]      : 1980     Age: 42 y.o.  [address]  [place] [address]    Presenting Radiologists: Tony Corral MD    Providers  Hepatologists: Dudley Plaza MD    Priority of review: Cancer    Patient Transplant Status: Hepatology    Reason for presentation: Reassessment    Clinical Summary: who was referred to Hepatology Clinic for liver lesions. No known underlying liver disease. She underwent noncontrasted CT in 2023 for flank pain that incidentally showed 3 liver lesions measuring up to 2.3 cm.  Subsequent MRI in 2023 showed a 1.8 cm liver lesion in the hepatic dome consistent with a hemangioma as well as 2.5 cm lesion in the right hepatic lobe with enhancement and washout.    Imaging to be reviewed: MRI 2023    HCC Treatment History: none    Platelets:   Lab Results   Component Value Date/Time     2023 10:05 AM     Creatinine:   Lab Results   Component Value Date/Time    CREATININE 0.8 2023 10:05 AM     Bilirubin:   Lab Results   Component Value Date/Time    BILITOT 0.3 2023 10:05 AM     AFP Last 3 each if available: No results found for: AFP, EXTAFP    MELD: Computed MELD-Na score unavailable. Necessary lab results were not found in the last year.  Computed MELD score unavailable. Necessary lab results were not found in the last year.    Plan: CT not available for review.    Seg 7 lesion is hemangioma.  Seg 5 lesion is still indeterminate but favor benign process.  Consider 3 month follow up with [facility]    Committee Discussion: S7 lesion consistent with hemangioma. Additional lesion, more anterior, favors an FNH. Low suspicion for malignancy.      Plan: [facility] mri in 3 months (July)    Note forwarded to [facility] Staff to schedule follow-up    Follow-up Provider: Dudley Plaza MD

## 2023-05-18 ENCOUNTER — TELEPHONE (OUTPATIENT)
Dept: HEPATOLOGY | Facility: CLINIC | Age: 43
End: 2023-05-18
Payer: MEDICAID

## 2023-05-18 DIAGNOSIS — K76.9 LIVER DISEASE, UNSPECIFIED: Primary | ICD-10-CM

## 2023-05-18 DIAGNOSIS — R93.5 ABNORMAL MRI OF ABDOMEN: ICD-10-CM

## 2023-05-18 NOTE — TELEPHONE ENCOUNTER
Per Dr Plaza,   I Told patient that MRI showed no evidence of cancer and arrange MRI with [facility] in July 2023.           Patient verbalized understanding.  MRI scheduled on 8/4/23

## 2023-09-07 ENCOUNTER — TELEPHONE (OUTPATIENT)
Dept: HEPATOLOGY | Facility: CLINIC | Age: 43
End: 2023-09-07
Payer: MEDICAID

## 2023-09-07 ENCOUNTER — PATIENT MESSAGE (OUTPATIENT)
Dept: HEPATOLOGY | Facility: CLINIC | Age: 43
End: 2023-09-07
Payer: MEDICAID

## 2023-09-07 DIAGNOSIS — K76.9 LIVER DISEASE, UNSPECIFIED: Primary | ICD-10-CM

## 2023-09-07 DIAGNOSIS — K76.9 LIVER LESION: ICD-10-CM

## 2023-09-11 ENCOUNTER — HOSPITAL ENCOUNTER (OUTPATIENT)
Dept: RADIOLOGY | Facility: HOSPITAL | Age: 43
Discharge: HOME | End: 2023-09-11
Attending: STUDENT IN AN ORGANIZED HEALTH CARE EDUCATION/TRAINING PROGRAM
Payer: MEDICAID

## 2023-09-11 DIAGNOSIS — K76.9 LIVER DISEASE, UNSPECIFIED: ICD-10-CM

## 2023-09-11 DIAGNOSIS — K76.9 LIVER LESION: ICD-10-CM

## 2023-09-11 PROCEDURE — 74183 MRI ABD W/O CNTR FLWD CNTR: CPT | Mod: 26,,, | Performed by: RADIOLOGY

## 2023-09-11 PROCEDURE — A9581 GADOXETATE DISODIUM INJ: HCPCS | Performed by: STUDENT IN AN ORGANIZED HEALTH CARE EDUCATION/TRAINING PROGRAM

## 2023-09-11 PROCEDURE — 25500020 PHARM REV CODE 255: Performed by: STUDENT IN AN ORGANIZED HEALTH CARE EDUCATION/TRAINING PROGRAM

## 2023-09-11 PROCEDURE — 74183 MRI ABD W/O CNTR FLWD CNTR: CPT | Mod: TC

## 2023-09-11 PROCEDURE — 74183 MRI ABDOMEN W WO CONTRAST: ICD-10-PCS | Mod: 26,,, | Performed by: RADIOLOGY

## 2023-09-11 RX ORDER — GADOBUTROL 604.72 MG/ML
10 INJECTION INTRAVENOUS
Status: DISCONTINUED | OUTPATIENT
Start: 2023-09-11 | End: 2023-09-12 | Stop reason: HOSPADM

## 2023-09-11 RX ADMIN — GADOXETATE DISODIUM 10 ML: 181.43 INJECTION, SOLUTION INTRAVENOUS at 06:09

## 2023-09-15 NOTE — PROGRESS NOTES
Ms. 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 These are both non-cancerous and do not need further work up or monitoring.  Please let me know if you have any questions.    Sincerely,  Dudley Plaza

## 2023-09-19 ENCOUNTER — PATIENT MESSAGE (OUTPATIENT)
Dept: OBSTETRICS AND GYNECOLOGY | Facility: CLINIC | Age: 43
End: 2023-09-19
Payer: MEDICAID

## 2023-09-20 ENCOUNTER — HOSPITAL ENCOUNTER (EMERGENCY)
Facility: HOSPITAL | Age: 43
Discharge: HOME | End: 2023-09-20
Attending: EMERGENCY MEDICINE
Payer: MEDICAID

## 2023-09-20 VITALS
TEMPERATURE: 100 F | BODY MASS INDEX: 23.9 KG/M2 | HEART RATE: 79 BPM | RESPIRATION RATE: 14 BRPM | WEIGHT: 140 LBS | OXYGEN SATURATION: 99 % | SYSTOLIC BLOOD PRESSURE: 119 MMHG | DIASTOLIC BLOOD PRESSURE: 66 MMHG | HEIGHT: 64 IN

## 2023-09-20 DIAGNOSIS — Z20.2 POSSIBLE EXPOSURE TO STD: ICD-10-CM

## 2023-09-20 DIAGNOSIS — U07.1 COVID: Primary | ICD-10-CM

## 2023-09-20 LAB
ALBUMIN SERPL BCP-MCNC: 4.5 G/DL (ref 3.5–5.2)
ALP SERPL-CCNC: 73 U/L (ref 55–135)
ALT SERPL W/O P-5'-P-CCNC: 36 U/L (ref 10–44)
ANION GAP SERPL CALC-SCNC: 9 MMOL/L (ref 8–16)
AST SERPL-CCNC: 21 U/L (ref 10–40)
B-HCG UR QL: NEGATIVE
BACTERIA GENITAL QL WET PREP: ABNORMAL
BASOPHILS # BLD AUTO: 0.02 K/UL (ref 0–0.2)
BASOPHILS NFR BLD: 0.3 % (ref 0–1.9)
BILIRUB SERPL-MCNC: 0.6 MG/DL (ref 0.1–1)
BILIRUB UR QL STRIP: NEGATIVE
BUN SERPL-MCNC: 11 MG/DL (ref 6–20)
CALCIUM SERPL-MCNC: 9.1 MG/DL (ref 8.7–10.5)
CHLORIDE SERPL-SCNC: 106 MMOL/L (ref 95–110)
CLARITY UR: CLEAR
CLUE CELLS VAG QL WET PREP: ABNORMAL
CO2 SERPL-SCNC: 23 MMOL/L (ref 23–29)
COLOR UR: YELLOW
CREAT SERPL-MCNC: 0.9 MG/DL (ref 0.5–1.4)
DIFFERENTIAL METHOD: NORMAL
EOSINOPHIL # BLD AUTO: 0 K/UL (ref 0–0.5)
EOSINOPHIL NFR BLD: 0.5 % (ref 0–8)
ERYTHROCYTE [DISTWIDTH] IN BLOOD BY AUTOMATED COUNT: 11.9 % (ref 11.5–14.5)
EST. GFR  (NO RACE VARIABLE): >60 ML/MIN/1.73 M^2
FILAMENT FUNGI VAG WET PREP-#/AREA: ABNORMAL
GLUCOSE SERPL-MCNC: 94 MG/DL (ref 70–110)
GLUCOSE UR QL STRIP: NEGATIVE
HCT VFR BLD AUTO: 40.7 % (ref 37–48.5)
HGB BLD-MCNC: 13.9 G/DL (ref 12–16)
HGB UR QL STRIP: NEGATIVE
IMM GRANULOCYTES # BLD AUTO: 0.03 K/UL (ref 0–0.04)
IMM GRANULOCYTES NFR BLD AUTO: 0.5 % (ref 0–0.5)
INFLUENZA A, MOLECULAR: NEGATIVE
INFLUENZA B, MOLECULAR: NEGATIVE
KETONES UR QL STRIP: ABNORMAL
LEUKOCYTE ESTERASE UR QL STRIP: NEGATIVE
LYMPHOCYTES # BLD AUTO: 1.6 K/UL (ref 1–4.8)
LYMPHOCYTES NFR BLD: 27.1 % (ref 18–48)
MCH RBC QN AUTO: 30.5 PG (ref 27–31)
MCHC RBC AUTO-ENTMCNC: 34.2 G/DL (ref 32–36)
MCV RBC AUTO: 90 FL (ref 82–98)
MONOCYTES # BLD AUTO: 0.3 K/UL (ref 0.3–1)
MONOCYTES NFR BLD: 5.8 % (ref 4–15)
NEUTROPHILS # BLD AUTO: 3.8 K/UL (ref 1.8–7.7)
NEUTROPHILS NFR BLD: 65.8 % (ref 38–73)
NITRITE UR QL STRIP: NEGATIVE
NRBC BLD-RTO: 0 /100 WBC
PH UR STRIP: 6 [PH] (ref 5–8)
PLATELET # BLD AUTO: 247 K/UL (ref 150–450)
PMV BLD AUTO: 9.8 FL (ref 9.2–12.9)
POTASSIUM SERPL-SCNC: 4.1 MMOL/L (ref 3.5–5.1)
PROT SERPL-MCNC: 7.4 G/DL (ref 6–8.4)
PROT UR QL STRIP: NEGATIVE
RBC # BLD AUTO: 4.55 M/UL (ref 4–5.4)
SARS-COV-2 RDRP RESP QL NAA+PROBE: POSITIVE
SODIUM SERPL-SCNC: 138 MMOL/L (ref 136–145)
SP GR UR STRIP: 1.01 (ref 1–1.03)
SPECIMEN SOURCE: ABNORMAL
SPECIMEN SOURCE: NORMAL
T VAGINALIS GENITAL QL WET PREP: ABNORMAL
URN SPEC COLLECT METH UR: ABNORMAL
UROBILINOGEN UR STRIP-ACNC: NEGATIVE EU/DL
WBC # BLD AUTO: 5.72 K/UL (ref 3.9–12.7)
WBC #/AREA VAG WET PREP: ABNORMAL
YEAST GENITAL QL WET PREP: ABNORMAL

## 2023-09-20 PROCEDURE — 87210 SMEAR WET MOUNT SALINE/INK: CPT

## 2023-09-20 PROCEDURE — 96372 THER/PROPH/DIAG INJ SC/IM: CPT

## 2023-09-20 PROCEDURE — 87502 INFLUENZA DNA AMP PROBE: CPT

## 2023-09-20 PROCEDURE — 99284 EMERGENCY DEPT VISIT MOD MDM: CPT

## 2023-09-20 PROCEDURE — 63600175 PHARM REV CODE 636 W HCPCS

## 2023-09-20 PROCEDURE — 85025 COMPLETE CBC W/AUTO DIFF WBC: CPT

## 2023-09-20 PROCEDURE — 81025 URINE PREGNANCY TEST: CPT

## 2023-09-20 PROCEDURE — 81003 URINALYSIS AUTO W/O SCOPE: CPT

## 2023-09-20 PROCEDURE — 80053 COMPREHEN METABOLIC PANEL: CPT

## 2023-09-20 PROCEDURE — U0002 COVID-19 LAB TEST NON-CDC: HCPCS

## 2023-09-20 RX ORDER — CEFTRIAXONE 500 MG/1
500 INJECTION, POWDER, FOR SOLUTION INTRAMUSCULAR; INTRAVENOUS
Status: COMPLETED | OUTPATIENT
Start: 2023-09-20 | End: 2023-09-20

## 2023-09-20 RX ORDER — DOXYCYCLINE 100 MG/1
100 CAPSULE ORAL 2 TIMES DAILY
Qty: 20 CAPSULE | Refills: 0 | Status: SHIPPED | OUTPATIENT
Start: 2023-09-20 | End: 2023-09-30

## 2023-09-20 RX ORDER — CEFTRIAXONE 500 MG/1
500 INJECTION, POWDER, FOR SOLUTION INTRAMUSCULAR; INTRAVENOUS
Status: DISCONTINUED | OUTPATIENT
Start: 2023-09-20 | End: 2023-09-20

## 2023-09-20 RX ADMIN — CEFTRIAXONE SODIUM 500 MG: 500 INJECTION, POWDER, FOR SOLUTION INTRAMUSCULAR; INTRAVENOUS at 03:09

## 2023-09-20 NOTE — DISCHARGE INSTRUCTIONS
Thank you for letting me care for you today - it was nice to meet you and I hope you feel better soon. Please return to the ER if your symptoms don't improve or get worse. And be sure to follow up with your primary care provider within the next week and with gynecology for follow up care. [facility] will call you within 48 hours to make an appointment, or you can call [phone] to schedule.     Our goal at [facility] is to always give you outstanding care and exceptional service. You may receive a survey by mail or email in the next week about your experience in our ED. We would greatly appreciate you completing and returning the survey. Your feedback provides us with a way to recognize our staff who give very good care and it helps us learn how to improve when your experience was below our aspiration of excellence.     All the best,     Lauren Martin, MPH, PA-C  Emergency Department Physician Assistant  [facility]

## 2023-09-20 NOTE — ED PROVIDER NOTES
Encounter Date: 2023       History     Chief Complaint   Patient presents with    Cough     Cough, nasal congestion, chills, N/V, and diarrhea X1 week. Symptoms have been persistent with little relief from OTC medications. Increase in fatigue. Denies sore throat and headache.     Flank Pain     Denies blood/burning with urination. Pain to both sides more on the left. Decreased urination.      Patient is a 42-year-old female who presents for evaluation of cough, nasal congestion, diarrhea x1 week.  She has also had 2 episodes of nausea and vomiting yesterday. Patient has been taking ibuprofen without full resolution of the symptoms.  Additionally patient complains of occasional suprapubic tenderness and bilateral flank pain and is concerned for possible urinary tract infection.  Patient also mentions that she had unprotected sex approximately 1 month ago and would like to be screened for STDs.  She denies any unusual vaginal discharge or vaginal bleeding.  Patient denies chest pain, shortness of breath, fever, sore throat, headache, or any other complaints at this time    The history is provided by the patient.     Review of patient's allergies indicates:   Allergen Reactions    Roxicet [oxycodone-acetaminophen] Itching and Edema     Starting taking Roxicet Friday.  Starting experieincing facial swelling and general itching Saturday.    Acetaminophen Other (See Comments)     hallucinations     Past Medical History:   Diagnosis Date    PONV (postoperative nausea and vomiting)      Past Surgical History:   Procedure Laterality Date     SECTION      ENDOMETRIAL ABLATION N/A 3/10/2022    Procedure: ABLATION, ENDOMETRIUM;  Surgeon: Royce Ruiz MD;  Location: [facility] OR;  Service: OB/GYN;  Laterality: N/A;    HYSTEROSCOPY WITH DILATION AND CURETTAGE OF UTERUS N/A 3/10/2022    Procedure: HYSTEROSCOPY, WITH DILATION AND CURETTAGE OF UTERUS;  Surgeon: Royce Ruiz MD;  Location: [facility] OR;  Service:  OB/GYN;  Laterality: N/A;    TUBAL LIGATION       Family History   Problem Relation Age of Onset    Hypertension Mother     Cervical cancer Maternal Aunt     Ovarian cancer Maternal Aunt     Cancer Paternal Aunt     Breast cancer Maternal Grandmother      Social History     Tobacco Use    Smoking status: Never    Smokeless tobacco: Never   Substance Use Topics    Alcohol use: No    Drug use: Never     Review of Systems   Constitutional:  Positive for fatigue. Negative for fever.   HENT:  Negative for sore throat.    Respiratory:  Negative for shortness of breath.    Cardiovascular:  Negative for chest pain.   Gastrointestinal:  Positive for abdominal pain, diarrhea, nausea and vomiting. Negative for abdominal distention (Mild suprapubic), blood in stool and constipation.   Genitourinary:  Positive for flank pain, frequency and pelvic pain. Negative for dysuria, hematuria, vaginal bleeding and vaginal discharge.   Musculoskeletal:  Negative for back pain.   Skin:  Negative for rash.   Neurological:  Negative for weakness.   Hematological:  Does not bruise/bleed easily.       Physical Exam     Initial Vitals [09/20/23 1318]   BP Pulse Resp Temp SpO2   126/78 75 18 -- 99 %      MAP       --         Physical Exam    Constitutional: She appears well-developed and well-nourished.   HENT:   Head: Normocephalic and atraumatic.   Eyes: EOM are normal.   Neck:   Normal range of motion.  Cardiovascular:  Normal rate and regular rhythm.           Pulmonary/Chest: Breath sounds normal. No respiratory distress. She has no wheezes. She has no rhonchi. She has no rales.   Abdominal: Abdomen is soft. She exhibits no distension. There is abdominal tenderness in the suprapubic area.   No right CVA tenderness.  There is left CVA tenderness. There is no rebound and no guarding.   Musculoskeletal:      Cervical back: Normal range of motion.     Neurological: She is alert and oriented to person, place, and time.         ED Course    Procedures  Labs Reviewed   VAGINAL SCREEN - Abnormal; Notable for the following components:       Result Value    WBC - Vaginal Screen Rare (*)     Bacteria - Vaginal Screen Moderate (*)     All other components within normal limits    Narrative:     Release to patient->Immediate   URINALYSIS, REFLEX TO URINE CULTURE - Abnormal; Notable for the following components:    Ketones, UA 2+ (*)     All other components within normal limits    Narrative:     Specimen Source->Urine   SARS-COV-2 RNA AMPLIFICATION, QUAL - Abnormal; Notable for the following components:    SARS-CoV-2 RNA, Amplification, Qual Positive (*)     All other components within normal limits    Narrative:        COVID-19 critical result(s) called and verbal readback obtained   from SAVANAH HINOJOSA JR. by JS4 09/20/2023 14:29   INFLUENZA A & B BY MOLECULAR   C. TRACHOMATIS/N. GONORRHOEAE BY AMP DNA   CBC W/ AUTO DIFFERENTIAL   COMPREHENSIVE METABOLIC PANEL   PREGNANCY TEST, URINE RAPID   PREGNANCY TEST, URINE RAPID    Narrative:     Specimen Source->Urine          Imaging Results    None          Medications   cefTRIAXone injection 500 mg (500 mg Intramuscular Given 9/20/23 7008)     Medical Decision Making  Patient is a afebrile, nontoxic appearing 42 year old female who presents for evaluation of nasal congestion, cough fatigue, and suprapubic pain symptoms x 7 days. Patient concerned about possible STD expsure. Denies chest pain, SOB, wheezing, difficulty swallowing, neck pain.     Differential Diagnosis includes, but is not limited to: COVID, influenza, viral URI, bacterial/viral pharyngitis, BV, trichomonas, vaginal candidiasis, UTI/pyelonephritis, chlamydia, gonorrhea    All historical, clinical, and laboratory findings were reviewed with the patient in detail. Findings are consistent with a diagnosis of COVID and possible STD. There are no concerning features on physical exam to suggest bacterial otitis media/externa, sinusitis, pharyngitis, or  peritonsillar abscess. Vital signs do not suggest sepsis. Lung sounds are clear and not consistent with pneumonia. There is no neck pain or limited ROM to suggest retropharyngeal abscess or meningitis. UA not indicative of urinary tract infection.  G/C is currently pending.  Patient empirically treated with dose of Rocephin in the ER and doxycycline sent to pharmacy.  Referral placed to primary care and OBGYN.    Patient informed about the current COVID-19 isolation precautions.  Patient has been counseled regarding the need for follow-up as well as the indication to return to the emergency room should new or worrisome developments occur. The patient verbalized an understanding. The patient is asked if there are any questions or concerns. Patient given discharge instructions. We discuss the case, until all issues are addressed to the patient's satisfaction. Patient understands and is agreeable to the plan. Case discussed with Supervision Physician, who is in agreement with plan.     Amount and/or Complexity of Data Reviewed  Labs: ordered. Decision-making details documented in ED Course.    Risk  Prescription drug management.               ED Course as of 09/20/23 1700   Wed Sep 20, 2023   1427 Patient examined and assessed.  Complains of cough and suprapubic pain x1 week.  Patient also concerned about possible STDs.  Patient answering questions appropriately, speaking in complete sentences, respirations are even and unlabored.  AAO x 4.  [OB]   1429 Leukocytes, UA: Negative [OB]   1429 Influenza A, Molecular: Negative [OB]   1429 Influenza B, Molecular: Negative [OB]   1429 SARS-CoV-2 RNA, Amplification, Qual(!): Positive [OB]   1445 WBC: 5.72 [OB]   1456 Spoke with patient and nurse.  Patient will self swab.  Will order dose of Rocephin. [OB]   1554 Preg Test, Ur: Negative [OB]   1604 WBC - Vaginal Screen(!): Rare [OB]   1605 Bacteria - Vaginal Screen(!): Moderate [OB]      ED Course User Index  [OB] Lauren Martin,  KIMBERLY                    Clinical Impression:   Final diagnoses:  [U07.1] COVID (Primary)  [Z20.2] Possible exposure to STD        ED Disposition Condition    Discharge Stable          ED Prescriptions       Medication Sig Dispense Start Date End Date Auth. Provider    doxycycline (VIBRAMYCIN) 100 MG Cap Take 1 capsule (100 mg total) by mouth 2 (two) times daily. for 10 days 20 capsule 9/20/2023 9/30/2023 Lauren Martin PA-C          Follow-up Information    None          Lauren Martin PA-C  09/20/23 1700

## 2023-09-20 NOTE — Clinical Note
"Kenya"Maggie Charles was seen and treated in our emergency department on 9/20/2023.     COVID-19 is present in our communities across the state. There is limited testing for COVID at this time, so not all patients can be tested. In this situation, your employee meets the following criteria:    Kenya Charles has met the criteria for COVID-19 testing and has a POSITIVE result. She can return to work once they are asymptomatic for 24 hours without the use of fever reducing medications AND at least five days from the first positive result. A mask is recommended for 5 days post quarantine.     If you have any questions or concerns, or if I can be of further assistance, please do not hesitate to contact me.    Sincerely,             Lauren Martin PA-C"

## 2023-09-26 ENCOUNTER — LAB VISIT (OUTPATIENT)
Dept: LAB | Facility: HOSPITAL | Age: 43
End: 2023-09-26
Attending: STUDENT IN AN ORGANIZED HEALTH CARE EDUCATION/TRAINING PROGRAM
Payer: MEDICAID

## 2023-09-26 ENCOUNTER — PATIENT MESSAGE (OUTPATIENT)
Dept: OBSTETRICS AND GYNECOLOGY | Facility: CLINIC | Age: 43
End: 2023-09-26
Payer: MEDICAID

## 2023-09-26 ENCOUNTER — HOSPITAL ENCOUNTER (EMERGENCY)
Facility: HOSPITAL | Age: 43
Discharge: HOME | End: 2023-09-26
Attending: EMERGENCY MEDICINE
Payer: MEDICAID

## 2023-09-26 ENCOUNTER — OFFICE VISIT (OUTPATIENT)
Dept: OBSTETRICS AND GYNECOLOGY | Facility: CLINIC | Age: 43
End: 2023-09-26
Payer: MEDICAID

## 2023-09-26 VITALS
DIASTOLIC BLOOD PRESSURE: 78 MMHG | SYSTOLIC BLOOD PRESSURE: 124 MMHG | TEMPERATURE: 99 F | OXYGEN SATURATION: 100 % | RESPIRATION RATE: 18 BRPM | HEART RATE: 83 BPM

## 2023-09-26 VITALS
BODY MASS INDEX: 23.99 KG/M2 | DIASTOLIC BLOOD PRESSURE: 60 MMHG | WEIGHT: 139.75 LBS | SYSTOLIC BLOOD PRESSURE: 112 MMHG

## 2023-09-26 DIAGNOSIS — Z20.2 POSSIBLE EXPOSURE TO STD: ICD-10-CM

## 2023-09-26 DIAGNOSIS — R10.2 PELVIC PAIN: Primary | ICD-10-CM

## 2023-09-26 DIAGNOSIS — Z20.822 ENCOUNTER FOR LABORATORY TESTING FOR COVID-19 VIRUS: ICD-10-CM

## 2023-09-26 DIAGNOSIS — R10.2 PELVIC PAIN: ICD-10-CM

## 2023-09-26 DIAGNOSIS — Z04.9 CONDITION NOT FOUND: Primary | ICD-10-CM

## 2023-09-26 DIAGNOSIS — Z11.3 ROUTINE SCREENING FOR STI (SEXUALLY TRANSMITTED INFECTION): ICD-10-CM

## 2023-09-26 PROCEDURE — 3078F DIAST BP <80 MM HG: CPT | Mod: CPTII,,, | Performed by: STUDENT IN AN ORGANIZED HEALTH CARE EDUCATION/TRAINING PROGRAM

## 2023-09-26 PROCEDURE — 3074F PR MOST RECENT SYSTOLIC BLOOD PRESSURE < 130 MM HG: ICD-10-PCS | Mod: CPTII,,, | Performed by: STUDENT IN AN ORGANIZED HEALTH CARE EDUCATION/TRAINING PROGRAM

## 2023-09-26 PROCEDURE — 99281 EMR DPT VST MAYX REQ PHY/QHP: CPT | Mod: 27

## 2023-09-26 PROCEDURE — 1159F PR MEDICATION LIST DOCUMENTED IN MEDICAL RECORD: ICD-10-PCS | Mod: CPTII,,, | Performed by: STUDENT IN AN ORGANIZED HEALTH CARE EDUCATION/TRAINING PROGRAM

## 2023-09-26 PROCEDURE — 87389 HIV-1 AG W/HIV-1&-2 AB AG IA: CPT | Performed by: STUDENT IN AN ORGANIZED HEALTH CARE EDUCATION/TRAINING PROGRAM

## 2023-09-26 PROCEDURE — 99214 OFFICE O/P EST MOD 30 MIN: CPT | Mod: S$PBB,,, | Performed by: STUDENT IN AN ORGANIZED HEALTH CARE EDUCATION/TRAINING PROGRAM

## 2023-09-26 PROCEDURE — 1160F PR REVIEW ALL MEDS BY PRESCRIBER/CLIN PHARMACIST DOCUMENTED: ICD-10-PCS | Mod: CPTII,,, | Performed by: STUDENT IN AN ORGANIZED HEALTH CARE EDUCATION/TRAINING PROGRAM

## 2023-09-26 PROCEDURE — 99999 PR PBB SHADOW E&M-EST. PATIENT-LVL III: CPT | Mod: PBBFAC,,, | Performed by: STUDENT IN AN ORGANIZED HEALTH CARE EDUCATION/TRAINING PROGRAM

## 2023-09-26 PROCEDURE — 86592 SYPHILIS TEST NON-TREP QUAL: CPT | Performed by: STUDENT IN AN ORGANIZED HEALTH CARE EDUCATION/TRAINING PROGRAM

## 2023-09-26 PROCEDURE — 3008F BODY MASS INDEX DOCD: CPT | Mod: CPTII,,, | Performed by: STUDENT IN AN ORGANIZED HEALTH CARE EDUCATION/TRAINING PROGRAM

## 2023-09-26 PROCEDURE — 3078F PR MOST RECENT DIASTOLIC BLOOD PRESSURE < 80 MM HG: ICD-10-PCS | Mod: CPTII,,, | Performed by: STUDENT IN AN ORGANIZED HEALTH CARE EDUCATION/TRAINING PROGRAM

## 2023-09-26 PROCEDURE — 1159F MED LIST DOCD IN RCRD: CPT | Mod: CPTII,,, | Performed by: STUDENT IN AN ORGANIZED HEALTH CARE EDUCATION/TRAINING PROGRAM

## 2023-09-26 PROCEDURE — 3074F SYST BP LT 130 MM HG: CPT | Mod: CPTII,,, | Performed by: STUDENT IN AN ORGANIZED HEALTH CARE EDUCATION/TRAINING PROGRAM

## 2023-09-26 PROCEDURE — 3008F PR BODY MASS INDEX (BMI) DOCUMENTED: ICD-10-PCS | Mod: CPTII,,, | Performed by: STUDENT IN AN ORGANIZED HEALTH CARE EDUCATION/TRAINING PROGRAM

## 2023-09-26 PROCEDURE — 36415 COLL VENOUS BLD VENIPUNCTURE: CPT | Performed by: STUDENT IN AN ORGANIZED HEALTH CARE EDUCATION/TRAINING PROGRAM

## 2023-09-26 PROCEDURE — 1160F RVW MEDS BY RX/DR IN RCRD: CPT | Mod: CPTII,,, | Performed by: STUDENT IN AN ORGANIZED HEALTH CARE EDUCATION/TRAINING PROGRAM

## 2023-09-26 PROCEDURE — 99214 PR OFFICE/OUTPT VISIT, EST, LEVL IV, 30-39 MIN: ICD-10-PCS | Mod: S$PBB,,, | Performed by: STUDENT IN AN ORGANIZED HEALTH CARE EDUCATION/TRAINING PROGRAM

## 2023-09-26 PROCEDURE — 99213 OFFICE O/P EST LOW 20 MIN: CPT | Mod: PBBFAC,PO | Performed by: STUDENT IN AN ORGANIZED HEALTH CARE EDUCATION/TRAINING PROGRAM

## 2023-09-26 PROCEDURE — 99999 PR PBB SHADOW E&M-EST. PATIENT-LVL III: ICD-10-PCS | Mod: PBBFAC,,, | Performed by: STUDENT IN AN ORGANIZED HEALTH CARE EDUCATION/TRAINING PROGRAM

## 2023-09-26 PROCEDURE — 80074 ACUTE HEPATITIS PANEL: CPT | Performed by: STUDENT IN AN ORGANIZED HEALTH CARE EDUCATION/TRAINING PROGRAM

## 2023-09-26 RX ORDER — KETOROLAC TROMETHAMINE 10 MG/1
10 TABLET, FILM COATED ORAL EVERY 6 HOURS PRN
Qty: 30 TABLET | Refills: 0 | Status: SHIPPED | OUTPATIENT
Start: 2023-09-26 | End: 2023-10-01

## 2023-09-26 RX ORDER — IBUPROFEN 800 MG/1
800 TABLET ORAL EVERY 8 HOURS PRN
COMMUNITY
Start: 2023-05-01

## 2023-09-26 NOTE — ED PROVIDER NOTES
Encounter Date: 2023       History     Chief Complaint   Patient presents with    COVID-19 Concerns     Pt was dx with covid on , pt states she has MD apt and cant go unless she has a neg covid test, pt states she received a call 10 min prior to apt      This is a 42-year-old white female that presents to the ED in need of either a notes stating she does not need another COVID test or a COVID test that is negative in order for her to see her OBGYN.  The patient has been asymptomatic for over a week but states her OBGYN is requesting she has a negative COVID test before she comes in.      Review of patient's allergies indicates:   Allergen Reactions    Roxicet [oxycodone-acetaminophen] Itching and Edema     Starting taking Roxicet Friday.  Starting experieincing facial swelling and general itching Saturday.    Acetaminophen Other (See Comments)     hallucinations     Past Medical History:   Diagnosis Date    PONV (postoperative nausea and vomiting)      Past Surgical History:   Procedure Laterality Date     SECTION      ENDOMETRIAL ABLATION N/A 3/10/2022    Procedure: ABLATION, ENDOMETRIUM;  Surgeon: Royce Ruiz MD;  Location: [facility] OR;  Service: OB/GYN;  Laterality: N/A;    HYSTEROSCOPY WITH DILATION AND CURETTAGE OF UTERUS N/A 3/10/2022    Procedure: HYSTEROSCOPY, WITH DILATION AND CURETTAGE OF UTERUS;  Surgeon: Royce Ruiz MD;  Location: [facility] OR;  Service: OB/GYN;  Laterality: N/A;    TUBAL LIGATION       Family History   Problem Relation Age of Onset    Hypertension Mother     Cervical cancer Maternal Aunt     Ovarian cancer Maternal Aunt     Cancer Paternal Aunt     Breast cancer Maternal Grandmother      Social History     Tobacco Use    Smoking status: Never    Smokeless tobacco: Never   Substance Use Topics    Alcohol use: No    Drug use: Never     Review of Systems   Constitutional:  Negative for fever.   HENT:  Negative for congestion, ear pain and sore throat.     Respiratory:  Negative for cough and shortness of breath.    Cardiovascular:  Negative for chest pain and palpitations.   Psychiatric/Behavioral:  Negative for agitation and behavioral problems.        Physical Exam     Initial Vitals [09/26/23 1004]   BP Pulse Resp Temp SpO2   124/78 83 18 98.8 °F (37.1 °C) 100 %      MAP       --         Physical Exam    Constitutional: She appears well-developed and well-nourished.   HENT:   Head: Normocephalic and atraumatic.   Right Ear: External ear normal.   Left Ear: External ear normal.   Nose: Nose normal.   Mouth/Throat: Oropharynx is clear and moist. No oropharyngeal exudate.   Cardiovascular:  Normal rate, regular rhythm and normal heart sounds.           Pulmonary/Chest: Breath sounds normal. She has no wheezes.     Neurological: She is alert and oriented to person, place, and time.   Psychiatric: She has a normal mood and affect. Thought content normal.         ED Course   Procedures  Labs Reviewed - No data to display         Imaging Results    None          Medications - No data to display  Medical Decision Making  This is a 42-year-old white female that presents to ED stating she either needs a note stating she does not need a COVID test or present a negative COVID test so she can be seen by her OBGYN.  The patient has been asymptomatic for almost a week.  Therefore I do not believe she needs a COVID test at this time.  The patient has been discharged with a note explaining this.  She is to have PCP follow up in the next 2-3 days or return to the ED for worsening.  She verbalized understanding and was agreeable to the treatment plan.    Amount and/or Complexity of Data Reviewed  Labs: ordered.                               Clinical Impression:   Final diagnoses:  [Z04.9] Condition not found (Primary)  [Z20.822] Encounter for laboratory testing for COVID-19 virus        ED Disposition Condition    Discharge Stable          ED Prescriptions    None       Follow-up  Information       Follow up With Specialties Details Why Contact Info    [place] - Emergency Dept Emergency Medicine  If symptoms worsen [address] [facility] [address] [phone]             Miky Molina PA-C  09/26/23 1114

## 2023-09-26 NOTE — ED TRIAGE NOTES
Tested positive for COVID on 9/20 on about 5th day of symptoms. Is not symptom-free but was told by MD office that she needed negative COVID to be seen. Presents with no complaints at this time and has been without symptoms for at least 5 days.

## 2023-09-26 NOTE — PROGRESS NOTES
History & Physical  Gynecology      SUBJECTIVE:     Chief Complaint: Pelvic Pain     History of Present Illness:  Kenya is a 42 yr , patient of Dr. Ruiz, who presents with a 2 week history of pelvic pain and inflammation. She states that the pain occurs intermittently and is sub-optimally controlled with Ibuprofen. She presented to the ED where she was treated empirically for PID with Rocephin and a 7 day course of Doxycycline. She also treated herself for BV with clindamycin. She did have a sexual encounter about 8 weeks ago during which the condom broke. She is both s/p BTL and endometrial ablation. She uses rectal suppositories to help her have bowel movements.     Review of patient's allergies indicates:   Allergen Reactions    Oxycodone-acetaminophen Itching, Edema and Other (See Comments)     Starting taking Roxicet Friday.  Starting experieincing facial swelling and general itching Saturday.  Starting taking Roxicet Friday.  Starting experieincing facial swelling and general itching Saturday.    hallucinations    Acetaminophen Other (See Comments)     hallucinations       Past Medical History:   Diagnosis Date    PONV (postoperative nausea and vomiting)      Past Surgical History:   Procedure Laterality Date     SECTION      ENDOMETRIAL ABLATION N/A 3/10/2022    Procedure: ABLATION, ENDOMETRIUM;  Surgeon: Royce Ruiz MD;  Location: [facility] OR;  Service: OB/GYN;  Laterality: N/A;    HYSTEROSCOPY WITH DILATION AND CURETTAGE OF UTERUS N/A 3/10/2022    Procedure: HYSTEROSCOPY, WITH DILATION AND CURETTAGE OF UTERUS;  Surgeon: Royce Ruiz MD;  Location: [facility] OR;  Service: OB/GYN;  Laterality: N/A;    TUBAL LIGATION       OB History          4    Para   2    Term   1       1    AB   2    Living   1         SAB   2    IAB        Ectopic        Multiple        Live Births   1               Family History   Problem Relation Age of Onset    Hypertension Mother     Cervical  cancer Maternal Aunt     Ovarian cancer Maternal Aunt     Cancer Paternal Aunt     Breast cancer Maternal Grandmother      Social History     Tobacco Use    Smoking status: Never    Smokeless tobacco: Never   Substance Use Topics    Alcohol use: No    Drug use: Never       Current Outpatient Medications   Medication Sig    doxycycline (VIBRAMYCIN) 100 MG Cap Take 1 capsule (100 mg total) by mouth 2 (two) times daily. for 10 days    ibuprofen (ADVIL,MOTRIN) 800 MG tablet Take 800 mg by mouth every 8 (eight) hours as needed.    clindamycin (CLEOCIN) 300 MG capsule Take 300 mg by mouth 3 (three) times daily.    ketorolac (TORADOL) 10 mg tablet Take 1 tablet (10 mg total) by mouth every 6 (six) hours as needed for Pain.    metroNIDAZOLE (METROGEL) 0.75 % (37.5mg/5 gram) vaginal gel Place vaginally.     No current facility-administered medications for this visit.       Review of Systems:  Review of Systems   Constitutional:  Negative for chills, fatigue and fever.   HENT:  Negative for congestion.    Eyes:  Negative for visual disturbance.   Respiratory:  Negative for cough and shortness of breath.    Cardiovascular:  Negative for chest pain and palpitations.   Gastrointestinal:  Negative for abdominal distention, abdominal pain, constipation, diarrhea, nausea and vomiting.   Genitourinary:  Positive for pelvic pain. Negative for difficulty urinating, dysuria, hematuria, vaginal bleeding and vaginal discharge.   Skin:  Negative for rash.   Neurological:  Negative for dizziness, seizures, light-headedness and headaches.   Hematological:  Does not bruise/bleed easily.   Psychiatric/Behavioral:  Negative for dysphoric mood. The patient is not nervous/anxious.         OBJECTIVE:     Physical Exam:  Vitals:    09/26/23 1127   BP: 112/60      Physical Exam  Vitals and nursing note reviewed. Exam conducted with a chaperone present.   Constitutional:       General: She is not in acute distress.     Appearance: She is  well-developed.   HENT:      Head: Normocephalic and atraumatic.   Eyes:      Pupils: Pupils are equal, round, and reactive to light.   Cardiovascular:      Rate and Rhythm: Normal rate and regular rhythm.   Pulmonary:      Effort: Pulmonary effort is normal. No respiratory distress.   Abdominal:      General: There is no distension.      Palpations: Abdomen is soft. There is no mass.      Tenderness: There is no abdominal tenderness. There is no guarding.   Genitourinary:     Comments: SSE: Normal external female genitalia, normal urethral meatus, normal vaginal rugae, normal vaginal mucosa, no vaginal blood in canal, normal physiologic discharge, normal cervix, no adnexal masses palpated on bimanual exam.     Musculoskeletal:         General: Normal range of motion.      Cervical back: Normal range of motion and neck supple.   Skin:     General: Skin is warm and dry.   Neurological:      Mental Status: She is alert and oriented to person, place, and time.   Psychiatric:         Behavior: Behavior normal.         Thought Content: Thought content normal.         Judgment: Judgment normal.         ASSESSMENT/PLAN:     1. Pelvic pain  - Thorough review of patient's work up and history of date  - Prior pelvic imaging reviewed  - Reassuring clinical exam, further patient is getting treating for PID and BV  - Discussed etiologies of pelvic pain, gyn and non-gyn. Etiology of patient complaints uncertain. Post-tubal ablation syndrome?  - Will get pelvic imaging to assess for occult anatomical abnormality  - US Pelvis Complete Non OB; Future      2. Routine screening for STI (sexually transmitted infection)  - HIV 1/2 Ag/Ab (4th Gen); Future  - RPR; Future  - Hepatitis Panel, Acute; Future      Salo Sy M.D.  OB/GYN  [facility]

## 2023-09-26 NOTE — Clinical Note
"Kenya"Maggie Charles was seen and treated in our emergency department on 9/26/2023.     COVID-19 is present in our communities across the state. There is limited testing for COVID at this time, so not all patients can be tested. In this situation, your employee meets the following criteria:    Kenya Charles has expressed a desire/need to be tested for the COVID-19 virus but has none of the symptoms that one would associate with COVID-19. In the absence of symptoms, the patient does NOT meet the criteria for testing and should proceed with their work schedule as planned, following the routine infection control policies of the employer, as well as good hand hygiene.      If you have any questions or concerns, or if I can be of further assistance, please do not hesitate to contact me.    Sincerely,             Miky Molina PA-C"

## 2023-09-27 ENCOUNTER — HOSPITAL ENCOUNTER (OUTPATIENT)
Dept: RADIOLOGY | Facility: OTHER | Age: 43
Discharge: HOME | End: 2023-09-27
Attending: STUDENT IN AN ORGANIZED HEALTH CARE EDUCATION/TRAINING PROGRAM
Payer: MEDICAID

## 2023-09-27 DIAGNOSIS — Z11.3 ROUTINE SCREENING FOR STI (SEXUALLY TRANSMITTED INFECTION): ICD-10-CM

## 2023-09-27 LAB
HAV IGM SERPL QL IA: NORMAL
HBV CORE IGM SERPL QL IA: NORMAL
HBV SURFACE AG SERPL QL IA: NORMAL
HCV AB SERPL QL IA: NORMAL
HIV 1+2 AB+HIV1 P24 AG SERPL QL IA: NORMAL
RPR SER QL: NORMAL

## 2023-09-27 PROCEDURE — 76856 US PELVIS COMPLETE NON OB: ICD-10-PCS | Mod: 26,,, | Performed by: RADIOLOGY

## 2023-09-27 PROCEDURE — 76856 US EXAM PELVIC COMPLETE: CPT | Mod: TC

## 2023-09-27 PROCEDURE — 76856 US EXAM PELVIC COMPLETE: CPT | Mod: 26,,, | Performed by: RADIOLOGY

## 2023-10-04 ENCOUNTER — PATIENT MESSAGE (OUTPATIENT)
Dept: OBSTETRICS AND GYNECOLOGY | Facility: CLINIC | Age: 43
End: 2023-10-04
Payer: MEDICAID

## 2023-10-06 ENCOUNTER — OFFICE VISIT (OUTPATIENT)
Dept: HEPATOLOGY | Facility: CLINIC | Age: 43
End: 2023-10-06
Payer: MEDICAID

## 2023-10-06 DIAGNOSIS — K76.9 LIVER LESION: Primary | ICD-10-CM

## 2023-10-06 PROCEDURE — 1159F MED LIST DOCD IN RCRD: CPT | Mod: CPTII,95,, | Performed by: STUDENT IN AN ORGANIZED HEALTH CARE EDUCATION/TRAINING PROGRAM

## 2023-10-06 PROCEDURE — 99213 OFFICE O/P EST LOW 20 MIN: CPT | Mod: 95,,, | Performed by: STUDENT IN AN ORGANIZED HEALTH CARE EDUCATION/TRAINING PROGRAM

## 2023-10-06 PROCEDURE — 1159F PR MEDICATION LIST DOCUMENTED IN MEDICAL RECORD: ICD-10-PCS | Mod: CPTII,95,, | Performed by: STUDENT IN AN ORGANIZED HEALTH CARE EDUCATION/TRAINING PROGRAM

## 2023-10-06 PROCEDURE — 1160F RVW MEDS BY RX/DR IN RCRD: CPT | Mod: CPTII,95,, | Performed by: STUDENT IN AN ORGANIZED HEALTH CARE EDUCATION/TRAINING PROGRAM

## 2023-10-06 PROCEDURE — 1160F PR REVIEW ALL MEDS BY PRESCRIBER/CLIN PHARMACIST DOCUMENTED: ICD-10-PCS | Mod: CPTII,95,, | Performed by: STUDENT IN AN ORGANIZED HEALTH CARE EDUCATION/TRAINING PROGRAM

## 2023-10-06 PROCEDURE — 99213 PR OFFICE/OUTPT VISIT, EST, LEVL III, 20-29 MIN: ICD-10-PCS | Mod: 95,,, | Performed by: STUDENT IN AN ORGANIZED HEALTH CARE EDUCATION/TRAINING PROGRAM

## 2023-10-06 NOTE — LETTER
October 10, 2023        Bienvenido Ventura, DO  [address] [facility] [address]  [address]  [facility] [address]             Hepatology Clinic - [address] [place], [place] [address] [facility] [address]  Phone: [phone]   Patient: Kenya Charles   MR Number: [id number]   YOB: 1980   Date of Visit: 10/6/2023       Dear Dr. Ventura:    I saw your patient, Kenya Charles, today for evaluation. Attached you will find relevant portions of my assessment and plan of care.       If you have questions, please do not hesitate to call me. I look forward to following Kenya Charles along with you.    Sincerely,      Dudley Plaza MD            CC  No Recipients    Enclosure

## 2023-10-06 NOTE — PROGRESS NOTES
Hepatology Follow Up Note    The patient location is: [place]  The chief complaint leading to consultation is: liver lesions    Visit type: audiovisual    Face to Face time with patient: 10  20 minutes of total time spent on the encounter, which includes face to face time and non-face to face time preparing to see the patient (eg, review of tests), Obtaining and/or reviewing separately obtained history, Documenting clinical information in the electronic or other health record, Independently interpreting results (not separately reported) and communicating results to the patient/family/caregiver, or Care coordination (not separately reported).     Each patient to whom he or she provides medical services by telemedicine is:  (1) informed of the relationship between the physician and patient and the respective role of any other health care provider with respect to management of the patient; and (2) notified that he or she may decline to receive medical services by telemedicine and may withdraw from such care at any time.    Referring provider: No ref. provider found  PCP: No, Primary Doctor    Chief complaint: liver lesions    HPI:  Kenya Charles is a 43 y.o. female with liver lesions who presents for follow up.    She is without complaints today.  She presented to ED last month with URI symptoms, nausea, vomiting, diarrhea and was found to have COVID.  She presented to ED a week later due to needing a repeat COVID test.  No other recent hospitalizations or ED visits.  She denies signs of decompensated liver disease including no recent abdominal distention, encephalopathy, jaundice, GI bleeding.    Background:  She underwent noncontrasted CT in March 2023 for flank pain that incidentally showed 3 liver lesions measuring up to 2.3 cm.  Subsequent MRI in April 2023 showed a 1.8 cm liver lesion in the hepatic dome consistent with a hemangioma as well as 2.5 cm lesion in the right hepatic lobe with enhancement and  washout.    Prior to this she had never been told that she had any liver issues.  No known family history of liver disease. She denies signs of decompensated liver disease including no recent abdominal distension, encephalopathy, jaundice, GI bleeding.    Past Medical History:   Diagnosis Date    PONV (postoperative nausea and vomiting)        Past Surgical History:   Procedure Laterality Date     SECTION      ENDOMETRIAL ABLATION N/A 3/10/2022    Procedure: ABLATION, ENDOMETRIUM;  Surgeon: Royce Ruiz MD;  Location: [facility] OR;  Service: OB/GYN;  Laterality: N/A;    HYSTEROSCOPY WITH DILATION AND CURETTAGE OF UTERUS N/A 3/10/2022    Procedure: HYSTEROSCOPY, WITH DILATION AND CURETTAGE OF UTERUS;  Surgeon: Royce Ruiz MD;  Location: [facility] OR;  Service: OB/GYN;  Laterality: N/A;    TUBAL LIGATION         Family History   Problem Relation Age of Onset    Hypertension Mother     Cervical cancer Maternal Aunt     Ovarian cancer Maternal Aunt     Cancer Paternal Aunt     Breast cancer Maternal Grandmother        Social History     Tobacco Use    Smoking status: Never    Smokeless tobacco: Never   Substance Use Topics    Alcohol use: No    Drug use: Never       Current Outpatient Medications   Medication Sig Dispense Refill    clindamycin (CLEOCIN) 300 MG capsule Take 300 mg by mouth 3 (three) times daily.      ibuprofen (ADVIL,MOTRIN) 800 MG tablet Take 800 mg by mouth every 8 (eight) hours as needed.      metroNIDAZOLE (METROGEL) 0.75 % (37.5mg/5 gram) vaginal gel Place vaginally.       No current facility-administered medications for this visit.       Review of patient's allergies indicates:   Allergen Reactions    Oxycodone-acetaminophen Itching, Edema and Other (See Comments)     Starting taking Roxicet Friday.  Starting experieincing facial swelling and general itching Saturday.  Starting taking Roxicet Friday.  Starting experieincing facial swelling and general itching  Saturday.    hallucinations    Acetaminophen Other (See Comments)     hallucinations       Review of Systems   Constitutional:  Negative for fever and weight loss.   Cardiovascular:  Negative for leg swelling.   Gastrointestinal:  Positive for abdominal pain. Negative for blood in stool, constipation, diarrhea, heartburn, melena, nausea and vomiting.       There were no vitals filed for this visit.      Physical Exam  Constitutional:       General: She is not in acute distress.     Appearance: She is not ill-appearing.   HENT:      Head: Normocephalic and atraumatic.   Eyes:      General: No scleral icterus.     Extraocular Movements: Extraocular movements intact.   Pulmonary:      Effort: No respiratory distress.   Skin:     Coloration: Skin is not jaundiced.   Neurological:      Mental Status: She is alert.         LABS: I personally reviewed pertinent laboratory findings.    Lab Results   Component Value Date    WBC 5.72 09/20/2023    HGB 13.9 09/20/2023    HCT 40.7 09/20/2023    MCV 90 09/20/2023     09/20/2023       Lab Results   Component Value Date     09/20/2023    K 4.1 09/20/2023     09/20/2023    CO2 23 09/20/2023    BUN 11 09/20/2023    CREATININE 0.9 09/20/2023    CALCIUM 9.1 09/20/2023    ANIONGAP 9 09/20/2023    ESTGFRAFRICA >60 11/06/2019    EGFRNONAA >60 11/06/2019       Lab Results   Component Value Date    ALT 36 09/20/2023    AST 21 09/20/2023    ALKPHOS 73 09/20/2023    BILITOT 0.6 09/20/2023       IMAGING: I personally reviewed imaging studies.      Assessment/Recommendations:  43 y.o. female who was referred to Hepatology Clinic for liver lesions.    She underwent noncontrasted CT in March 2023 for flank pain that incidentally showed 3 liver lesions measuring up to 2.3 cm.  Subsequent MRI in April 2023 showed a 1.8 cm liver lesion in the hepatic dome consistent with a hemangioma as well as 2.5 cm lesion in the right hepatic lobe with enhancement and washout. LFTs normal  03/2023. FibroScan in April 2023 showed S0 steatosis and F0 fibrosis.    Her imaging was reviewed in multidisciplinary liver radiology conference.  One lesion was consistent with hemangioma.  The other was indeterminate but favored to be benign, possibly FNG.  Recommendations were for MRI with Eovist.  This was done in September 2023 and showed a hemangioma and FNH. Discussed the benign nature of hepatic hemangiomas and FNH.  No further evaluation or surveillance is warranted.    This note includes dictation done using M*Modal speech recognition program. Word recognition mistakes are occasionally missed on review.    Dudley Plaza MD  Staff Physician  Hepatology and Liver Transplant  [facility]  [facility]

## 2023-10-16 DIAGNOSIS — R10.9 ABDOMINAL PAIN, UNSPECIFIED ABDOMINAL LOCATION: Primary | ICD-10-CM

## 2023-10-20 ENCOUNTER — TELEPHONE (OUTPATIENT)
Dept: OBSTETRICS AND GYNECOLOGY | Facility: CLINIC | Age: 43
End: 2023-10-20
Payer: MEDICAID

## 2023-10-20 ENCOUNTER — OFFICE VISIT (OUTPATIENT)
Dept: OBSTETRICS AND GYNECOLOGY | Facility: CLINIC | Age: 43
End: 2023-10-20
Payer: MEDICAID

## 2023-10-20 VITALS
SYSTOLIC BLOOD PRESSURE: 115 MMHG | WEIGHT: 141.31 LBS | HEART RATE: 87 BPM | BODY MASS INDEX: 24.26 KG/M2 | DIASTOLIC BLOOD PRESSURE: 77 MMHG

## 2023-10-20 DIAGNOSIS — R10.2 PELVIC PAIN: ICD-10-CM

## 2023-10-20 DIAGNOSIS — N89.8 VAGINAL DISCHARGE: Primary | ICD-10-CM

## 2023-10-20 LAB
B-HCG UR QL: NEGATIVE
CTP QC/QA: YES

## 2023-10-20 PROCEDURE — 99999PBSHW POCT URINE PREGNANCY: ICD-10-PCS | Mod: PBBFAC,,,

## 2023-10-20 PROCEDURE — 81003 URINALYSIS AUTO W/O SCOPE: CPT

## 2023-10-20 PROCEDURE — 99999 PR PBB SHADOW E&M-EST. PATIENT-LVL III: ICD-10-PCS | Mod: PBBFAC,,,

## 2023-10-20 PROCEDURE — 99213 OFFICE O/P EST LOW 20 MIN: CPT | Mod: S$PBB,,,

## 2023-10-20 PROCEDURE — 1159F PR MEDICATION LIST DOCUMENTED IN MEDICAL RECORD: ICD-10-PCS | Mod: CPTII,,,

## 2023-10-20 PROCEDURE — 3078F DIAST BP <80 MM HG: CPT | Mod: CPTII,,,

## 2023-10-20 PROCEDURE — 3074F PR MOST RECENT SYSTOLIC BLOOD PRESSURE < 130 MM HG: ICD-10-PCS | Mod: CPTII,,,

## 2023-10-20 PROCEDURE — 1159F MED LIST DOCD IN RCRD: CPT | Mod: CPTII,,,

## 2023-10-20 PROCEDURE — 81025 URINE PREGNANCY TEST: CPT | Mod: PBBFAC,PO

## 2023-10-20 PROCEDURE — 3008F PR BODY MASS INDEX (BMI) DOCUMENTED: ICD-10-PCS | Mod: CPTII,,,

## 2023-10-20 PROCEDURE — 3008F BODY MASS INDEX DOCD: CPT | Mod: CPTII,,,

## 2023-10-20 PROCEDURE — 1160F RVW MEDS BY RX/DR IN RCRD: CPT | Mod: CPTII,,,

## 2023-10-20 PROCEDURE — 81514 NFCT DS BV&VAGINITIS DNA ALG: CPT

## 2023-10-20 PROCEDURE — 3078F PR MOST RECENT DIASTOLIC BLOOD PRESSURE < 80 MM HG: ICD-10-PCS | Mod: CPTII,,,

## 2023-10-20 PROCEDURE — 99213 PR OFFICE/OUTPT VISIT, EST, LEVL III, 20-29 MIN: ICD-10-PCS | Mod: S$PBB,,,

## 2023-10-20 PROCEDURE — 3074F SYST BP LT 130 MM HG: CPT | Mod: CPTII,,,

## 2023-10-20 PROCEDURE — 1160F PR REVIEW ALL MEDS BY PRESCRIBER/CLIN PHARMACIST DOCUMENTED: ICD-10-PCS | Mod: CPTII,,,

## 2023-10-20 PROCEDURE — 87491 CHLMYD TRACH DNA AMP PROBE: CPT

## 2023-10-20 PROCEDURE — 99999PBSHW POCT URINE PREGNANCY: Mod: PBBFAC,,,

## 2023-10-20 PROCEDURE — 99213 OFFICE O/P EST LOW 20 MIN: CPT | Mod: PBBFAC,PO

## 2023-10-20 PROCEDURE — 99999 PR PBB SHADOW E&M-EST. PATIENT-LVL III: CPT | Mod: PBBFAC,,,

## 2023-10-20 NOTE — PROGRESS NOTES
Kenya Charles is a 43 y.o. female  s/p BTL and endometrial ablation who presents with complaint of excessive vaginal discharge and left sided pelvic pain for 1 month. She presented to the ED on  where she was treated empirically for PID with Rocephin and a 7 day course of Doxycycline. G/C results were never resulted. On  she was seen by Dr Sy for follow up and pelvic u/s was unremarkable. She states discharge has not improved since antibiotic treatment, is odorless and looks like jelly. She has tried boric acid suppositories in the past which did not provide any relief. She does use scented soaps. Denies burning, itching, dysuria, hematuria.    She does admit to having chronic constipation. She uses rectal suppositories to help have bowel movements. She was referred to GI by Dr Sy but has not received a call for scheduling. She has tried scheduling herself and states no appointments were available.    Past Medical History:   Diagnosis Date    PONV (postoperative nausea and vomiting)      Past Surgical History:   Procedure Laterality Date     SECTION      ENDOMETRIAL ABLATION N/A 3/10/2022    Procedure: ABLATION, ENDOMETRIUM;  Surgeon: Royce Ruiz MD;  Location: [facility] OR;  Service: OB/GYN;  Laterality: N/A;    HYSTEROSCOPY WITH DILATION AND CURETTAGE OF UTERUS N/A 3/10/2022    Procedure: HYSTEROSCOPY, WITH DILATION AND CURETTAGE OF UTERUS;  Surgeon: Royce Ruiz MD;  Location: [facility] OR;  Service: OB/GYN;  Laterality: N/A;    TUBAL LIGATION       Social History     Tobacco Use    Smoking status: Never    Smokeless tobacco: Never   Substance Use Topics    Alcohol use: No    Drug use: Never     OB History    Para Term  AB Living   4 2 1 1 2 1   SAB IAB Ectopic Multiple Live Births   2       1      # Outcome Date GA Lbr Roger/2nd Weight Sex Delivery Anes PTL Lv   4   35w0d  2.449 kg (5 lb 6.4 oz) F CS-LTranv  Y CRISTHIAN   3 Term            2 SAB             1 SAB                /77   Pulse 87   Wt 64.1 kg (141 lb 5 oz)   BMI 24.26 kg/m²     ROS:  GENERAL: No fever, chills, fatigability or weight loss.  ABDOMEN: Constipation. Denies nausea or vomiting.   URINARY: No frequency, dysuria, hematuria.  VULVOVAGINAL: See HPI.  NEUROLOGICAL: No headaches. No vision changes.    PHYSICAL EXAM:  APPEARANCE: Well nourished, well developed, in no acute distress.  AFFECT: WNL, alert and oriented x 3  SKIN: No acne or hirsutism  CHEST: Good respiratory effect  ABDOMEN: Soft.  No masses. +TTP to LLQ  PELVIC: Normal external genitalia without lesions.  Normal hair distribution.  Adequate perineal body, normal urethral meatus.  Vagina moist and well rugated without lesions or discharge.  Cervix pink, without lesions, discharge or tenderness.  No significant cystocele or rectocele. No adnexal masses.  EXTREMITIES: No edema.    ASSESSMENT and PLAN:  1. Vaginal discharge  Vaginosis Screen by DNA Probe    C. trachomatis/N. gonorrhoeae by AMP DNA      2. Pelvic pain  Urinalysis, Reflex to Urine Culture Urine, Clean Catch    POCT Urine Pregnancy    CANCELED: C. trachomatis/N. gonorrhoeae by AMP DNA        UA negative for leuks, ketones, nitrites.   UPT negative.    Discussed reassuring ultrasound results and normal pelvic exam. Affirm taken again and g/c collected from urine to rule out infection. Will follow results and update treatment as needed. Counseled to d/c scented soaps, detergents, lotions as these can cause more frequent vaginal infections.    Discussed this is likely GI in etiology as Dr. Sy had indicated. Message sent to  regarding referral follow up.     Patient confirms understanding of encounter and all medical questions answered.

## 2023-10-20 NOTE — TELEPHONE ENCOUNTER
Spoke to pt and she informs us that she would like to be seen asap due to re occurring bv/discharge. Informed her that our pa can see her today at 2:40 p.m pt verbalized understanding.

## 2023-10-23 LAB
BILIRUB UR QL STRIP: NEGATIVE
C TRACH DNA SPEC QL NAA+PROBE: NOT DETECTED
CLARITY UR REFRACT.AUTO: CLEAR
COLOR UR AUTO: YELLOW
GLUCOSE UR QL STRIP: NEGATIVE
HGB UR QL STRIP: NEGATIVE
KETONES UR QL STRIP: NEGATIVE
LEUKOCYTE ESTERASE UR QL STRIP: NEGATIVE
N GONORRHOEA DNA SPEC QL NAA+PROBE: NOT DETECTED
NITRITE UR QL STRIP: NEGATIVE
PH UR STRIP: 6 [PH] (ref 5–8)
PROT UR QL STRIP: NEGATIVE
SP GR UR STRIP: 1.02 (ref 1–1.03)
URN SPEC COLLECT METH UR: NORMAL

## 2023-12-05 ENCOUNTER — OFFICE VISIT (OUTPATIENT)
Dept: GASTROENTEROLOGY | Facility: CLINIC | Age: 43
End: 2023-12-05
Payer: MEDICAID

## 2023-12-05 VITALS
DIASTOLIC BLOOD PRESSURE: 76 MMHG | BODY MASS INDEX: 24.25 KG/M2 | SYSTOLIC BLOOD PRESSURE: 109 MMHG | HEART RATE: 74 BPM | OXYGEN SATURATION: 99 % | HEIGHT: 64 IN | WEIGHT: 142.06 LBS

## 2023-12-05 DIAGNOSIS — R10.84 GENERALIZED ABDOMINAL PAIN: Primary | ICD-10-CM

## 2023-12-05 DIAGNOSIS — K59.04 CHRONIC IDIOPATHIC CONSTIPATION: ICD-10-CM

## 2023-12-05 PROBLEM — R52 GENERALIZED BODY ACHES: Status: RESOLVED | Noted: 2019-08-31 | Resolved: 2023-12-05

## 2023-12-05 PROCEDURE — 99999 PR PBB SHADOW E&M-EST. PATIENT-LVL IV: ICD-10-PCS | Mod: PBBFAC,,, | Performed by: NURSE PRACTITIONER

## 2023-12-05 PROCEDURE — 3074F PR MOST RECENT SYSTOLIC BLOOD PRESSURE < 130 MM HG: ICD-10-PCS | Mod: CPTII,,, | Performed by: NURSE PRACTITIONER

## 2023-12-05 PROCEDURE — 1159F MED LIST DOCD IN RCRD: CPT | Mod: CPTII,,, | Performed by: NURSE PRACTITIONER

## 2023-12-05 PROCEDURE — 3008F BODY MASS INDEX DOCD: CPT | Mod: CPTII,,, | Performed by: NURSE PRACTITIONER

## 2023-12-05 PROCEDURE — 1160F PR REVIEW ALL MEDS BY PRESCRIBER/CLIN PHARMACIST DOCUMENTED: ICD-10-PCS | Mod: CPTII,,, | Performed by: NURSE PRACTITIONER

## 2023-12-05 PROCEDURE — 3008F PR BODY MASS INDEX (BMI) DOCUMENTED: ICD-10-PCS | Mod: CPTII,,, | Performed by: NURSE PRACTITIONER

## 2023-12-05 PROCEDURE — 99204 PR OFFICE/OUTPT VISIT, NEW, LEVL IV, 45-59 MIN: ICD-10-PCS | Mod: S$PBB,,, | Performed by: NURSE PRACTITIONER

## 2023-12-05 PROCEDURE — 99999 PR PBB SHADOW E&M-EST. PATIENT-LVL IV: CPT | Mod: PBBFAC,,, | Performed by: NURSE PRACTITIONER

## 2023-12-05 PROCEDURE — 3078F PR MOST RECENT DIASTOLIC BLOOD PRESSURE < 80 MM HG: ICD-10-PCS | Mod: CPTII,,, | Performed by: NURSE PRACTITIONER

## 2023-12-05 PROCEDURE — 99204 OFFICE O/P NEW MOD 45 MIN: CPT | Mod: S$PBB,,, | Performed by: NURSE PRACTITIONER

## 2023-12-05 PROCEDURE — 3078F DIAST BP <80 MM HG: CPT | Mod: CPTII,,, | Performed by: NURSE PRACTITIONER

## 2023-12-05 PROCEDURE — 1159F PR MEDICATION LIST DOCUMENTED IN MEDICAL RECORD: ICD-10-PCS | Mod: CPTII,,, | Performed by: NURSE PRACTITIONER

## 2023-12-05 PROCEDURE — 99214 OFFICE O/P EST MOD 30 MIN: CPT | Mod: PBBFAC,PN | Performed by: NURSE PRACTITIONER

## 2023-12-05 PROCEDURE — 3074F SYST BP LT 130 MM HG: CPT | Mod: CPTII,,, | Performed by: NURSE PRACTITIONER

## 2023-12-05 PROCEDURE — 1160F RVW MEDS BY RX/DR IN RCRD: CPT | Mod: CPTII,,, | Performed by: NURSE PRACTITIONER

## 2023-12-05 RX ORDER — POLYETHYLENE GLYCOL 3350 17 G/17G
17 POWDER, FOR SOLUTION ORAL DAILY
Qty: 510 G | Refills: 11 | Status: SHIPPED | OUTPATIENT
Start: 2023-12-05

## 2023-12-05 RX ORDER — DICYCLOMINE HYDROCHLORIDE 10 MG/1
10 CAPSULE ORAL 2 TIMES DAILY
Qty: 60 CAPSULE | Refills: 2 | Status: SHIPPED | OUTPATIENT
Start: 2023-12-05 | End: 2024-02-12

## 2023-12-05 RX ORDER — MELOXICAM 15 MG/1
15 TABLET ORAL
COMMUNITY
Start: 2023-11-10

## 2023-12-05 NOTE — PROGRESS NOTES
Subjective:       Patient ID: Kenya Charles is a 43 y.o. female.    Chief Complaint: Abdominal Pain (Left side) and Constipation    44 y/o female referred by GYN for abdominal pain and constipation.     Abdominal Pain  This is a recurrent problem. The current episode started more than 1 month ago. The problem occurs intermittently. The problem has been waxing and waning. Pain location: left side. The quality of the pain is aching, cramping and burning. The abdominal pain does not radiate. Associated symptoms include constipation. Pertinent negatives include no anorexia, belching, dysuria, fever, melena, nausea or vomiting. Nothing aggravates the pain. The pain is relieved by Bowel movements. She has tried nothing for the symptoms.       Past Medical History:   Diagnosis Date    PONV (postoperative nausea and vomiting)        Past Surgical History:   Procedure Laterality Date     SECTION      ENDOMETRIAL ABLATION N/A 3/10/2022    Procedure: ABLATION, ENDOMETRIUM;  Surgeon: Royce Ruiz MD;  Location: [facility] OR;  Service: OB/GYN;  Laterality: N/A;    HYSTEROSCOPY WITH DILATION AND CURETTAGE OF UTERUS N/A 3/10/2022    Procedure: HYSTEROSCOPY, WITH DILATION AND CURETTAGE OF UTERUS;  Surgeon: Royce Ruiz MD;  Location: [facility] OR;  Service: OB/GYN;  Laterality: N/A;    TUBAL LIGATION         Family History   Problem Relation Age of Onset    Hypertension Mother     Cervical cancer Maternal Aunt     Ovarian cancer Maternal Aunt     Cancer Paternal Aunt     Breast cancer Maternal Grandmother        Social History     Socioeconomic History    Marital status:    Tobacco Use    Smoking status: Never    Smokeless tobacco: Never   Substance and Sexual Activity    Alcohol use: No    Drug use: Never    Sexual activity: Yes     Partners: Male     Birth control/protection: See Surgical Hx       Review of Systems   Constitutional:  Negative for appetite change and fever.   Respiratory:  Negative for  "shortness of breath.    Cardiovascular:  Negative for chest pain.   Gastrointestinal:  Positive for abdominal pain and constipation. Negative for anorexia, melena, nausea and vomiting.   Genitourinary:  Negative for dysuria.   Hematological:  Negative for adenopathy.   Psychiatric/Behavioral:  Negative for dysphoric mood.          Objective:     Vitals:    12/05/23 1004   BP: 109/76   BP Location: Left arm   Patient Position: Sitting   BP Method: Medium (Automatic)   Pulse: 74   SpO2: 99%   Weight: 64.4 kg (142 lb 1.4 oz)   Height: 5' 4" (1.626 m)          Physical Exam  Constitutional:       General: She is not in acute distress.     Appearance: Normal appearance.   Eyes:      Conjunctiva/sclera: Conjunctivae normal.   Pulmonary:      Effort: Pulmonary effort is normal. No respiratory distress.   Musculoskeletal:         General: Normal range of motion.      Cervical back: Normal range of motion.   Skin:     General: Skin is warm and dry.   Neurological:      Mental Status: She is alert and oriented to person, place, and time.   Psychiatric:         Mood and Affect: Mood normal.         Behavior: Behavior normal.     Xray of abdomen was independently visualized and reviewed by me and showed moderate amount of stool throughout the colon.            Assessment:         ICD-10-CM ICD-9-CM   1. Generalized abdominal pain  R10.84 789.07   2. Chronic idiopathic constipation  K59.04 564.00       Plan:       Generalized abdominal pain  -     Ambulatory referral/consult to Gastroenterology  -     X-Ray Abdomen Flat And Erect; Future; Expected date: 12/05/2023  -     Pregnancy, urine rapid; Future  -     dicyclomine (BENTYL) 10 MG capsule; Take 1 capsule (10 mg total) by mouth 2 (two) times daily.  Dispense: 60 capsule; Refill: 2    Chronic idiopathic constipation  -     X-Ray Abdomen Flat And Erect; Future; Expected date: 12/05/2023  -     polyethylene glycol (GLYCOLAX) 17 gram/dose powder; Take 17 g by mouth once daily.  " Dispense: 510 g; Refill: 11  -     Dul/MOM clean out then start Miralax and fiber supplement daily        Follow up in about 6 weeks (around 1/16/2024) for medication management, If symptoms worsen or fail to improve.     Patient's Medications   New Prescriptions    DICYCLOMINE (BENTYL) 10 MG CAPSULE    Take 1 capsule (10 mg total) by mouth 2 (two) times daily.   Previous Medications    CLINDAMYCIN (CLEOCIN) 300 MG CAPSULE    Take 300 mg by mouth 3 (three) times daily.    IBUPROFEN (ADVIL,MOTRIN) 800 MG TABLET    Take 800 mg by mouth every 8 (eight) hours as needed.    MELOXICAM (MOBIC) 15 MG TABLET    Take 15 mg by mouth.    METRONIDAZOLE (METROGEL) 0.75 % (37.5MG/5 GRAM) VAGINAL GEL    Place vaginally.   Modified Medications    No medications on file   Discontinued Medications    No medications on file

## 2024-01-31 ENCOUNTER — PATIENT MESSAGE (OUTPATIENT)
Dept: OBSTETRICS AND GYNECOLOGY | Facility: CLINIC | Age: 44
End: 2024-01-31
Payer: MEDICAID

## 2024-02-08 ENCOUNTER — TELEPHONE (OUTPATIENT)
Dept: OBSTETRICS AND GYNECOLOGY | Facility: CLINIC | Age: 44
End: 2024-02-08
Payer: MEDICAID

## 2024-02-08 NOTE — TELEPHONE ENCOUNTER
Spoke with pt. Verified pt name and .    Explained to pt that Dr. Ruiz will be out of the office for 12 weeks due to an emergency and we will need to reschedule her appointment.     Pt verbalized understanding and scheduled appt with dr pepe for April 15 at 10;15am

## 2024-02-10 DIAGNOSIS — N76.0 ACUTE VAGINITIS: ICD-10-CM

## 2024-02-10 DIAGNOSIS — R10.84 GENERALIZED ABDOMINAL PAIN: ICD-10-CM

## 2024-02-12 RX ORDER — DICYCLOMINE HYDROCHLORIDE 10 MG/1
10 CAPSULE ORAL 2 TIMES DAILY
Qty: 60 CAPSULE | Refills: 2 | Status: SHIPPED | OUTPATIENT
Start: 2024-02-12 | End: 2024-05-15

## 2024-02-12 NOTE — TELEPHONE ENCOUNTER
Refill Routing Note   Medication(s) are not appropriate for processing by [facility] for the following reason(s):        Outside of protocol  No active prescription written by provider    [facility] action(s):  Route               Appointments  past 12m or future 3m with PCP    Date Provider   Last Visit   2/14/2023 Royce Ruiz MD   Next Visit   Visit date not found Royce Ruiz MD   ED visits in past 90 days: 0        Note composed:1:45 PM 02/12/2024

## 2024-04-11 ENCOUNTER — PATIENT MESSAGE (OUTPATIENT)
Dept: OBSTETRICS AND GYNECOLOGY | Facility: CLINIC | Age: 44
End: 2024-04-11
Payer: MEDICAID

## 2024-04-15 ENCOUNTER — HOSPITAL ENCOUNTER (OUTPATIENT)
Dept: RADIOLOGY | Facility: HOSPITAL | Age: 44
Discharge: HOME | End: 2024-04-15
Attending: OBSTETRICS & GYNECOLOGY
Payer: MEDICAID

## 2024-04-15 ENCOUNTER — OFFICE VISIT (OUTPATIENT)
Dept: OBSTETRICS AND GYNECOLOGY | Facility: CLINIC | Age: 44
End: 2024-04-15
Payer: MEDICAID

## 2024-04-15 ENCOUNTER — PATIENT MESSAGE (OUTPATIENT)
Dept: GASTROENTEROLOGY | Facility: CLINIC | Age: 44
End: 2024-04-15
Payer: MEDICAID

## 2024-04-15 VITALS — WEIGHT: 145.5 LBS | BODY MASS INDEX: 24.98 KG/M2 | DIASTOLIC BLOOD PRESSURE: 81 MMHG | SYSTOLIC BLOOD PRESSURE: 123 MMHG

## 2024-04-15 DIAGNOSIS — Z72.89 OTHER PROBLEMS RELATED TO LIFESTYLE: ICD-10-CM

## 2024-04-15 DIAGNOSIS — Z01.419 WELL WOMAN EXAM WITH ROUTINE GYNECOLOGICAL EXAM: Primary | ICD-10-CM

## 2024-04-15 DIAGNOSIS — Z12.31 ENCOUNTER FOR SCREENING MAMMOGRAM FOR MALIGNANT NEOPLASM OF BREAST: ICD-10-CM

## 2024-04-15 DIAGNOSIS — N94.10 DYSPAREUNIA IN FEMALE: ICD-10-CM

## 2024-04-15 DIAGNOSIS — Z12.4 ROUTINE CERVICAL SMEAR: ICD-10-CM

## 2024-04-15 DIAGNOSIS — Z11.3 ENCOUNTER FOR SCREENING FOR INFECTIONS WITH PREDOMINANTLY SEXUAL MODE OF TRANSMISSION: ICD-10-CM

## 2024-04-15 PROCEDURE — 77063 BREAST TOMOSYNTHESIS BI: CPT | Mod: 26,,, | Performed by: RADIOLOGY

## 2024-04-15 PROCEDURE — 77067 SCR MAMMO BI INCL CAD: CPT | Mod: 26,,, | Performed by: RADIOLOGY

## 2024-04-15 PROCEDURE — 87624 HPV HI-RISK TYP POOLED RSLT: CPT | Performed by: OBSTETRICS & GYNECOLOGY

## 2024-04-15 PROCEDURE — 3079F DIAST BP 80-89 MM HG: CPT | Mod: CPTII,,, | Performed by: OBSTETRICS & GYNECOLOGY

## 2024-04-15 PROCEDURE — 99396 PREV VISIT EST AGE 40-64: CPT | Mod: S$PBB,,, | Performed by: OBSTETRICS & GYNECOLOGY

## 2024-04-15 PROCEDURE — 3008F BODY MASS INDEX DOCD: CPT | Mod: CPTII,,, | Performed by: OBSTETRICS & GYNECOLOGY

## 2024-04-15 PROCEDURE — 88141 CYTOPATH C/V INTERPRET: CPT | Mod: ,,, | Performed by: PATHOLOGY

## 2024-04-15 PROCEDURE — 99213 OFFICE O/P EST LOW 20 MIN: CPT | Mod: PBBFAC,PO | Performed by: OBSTETRICS & GYNECOLOGY

## 2024-04-15 PROCEDURE — 99999 PR PBB SHADOW E&M-EST. PATIENT-LVL III: CPT | Mod: PBBFAC,,, | Performed by: OBSTETRICS & GYNECOLOGY

## 2024-04-15 PROCEDURE — 1159F MED LIST DOCD IN RCRD: CPT | Mod: CPTII,,, | Performed by: OBSTETRICS & GYNECOLOGY

## 2024-04-15 PROCEDURE — 77067 SCR MAMMO BI INCL CAD: CPT | Mod: TC

## 2024-04-15 PROCEDURE — 3074F SYST BP LT 130 MM HG: CPT | Mod: CPTII,,, | Performed by: OBSTETRICS & GYNECOLOGY

## 2024-04-15 PROCEDURE — 88175 CYTOPATH C/V AUTO FLUID REDO: CPT | Performed by: PATHOLOGY

## 2024-04-15 PROCEDURE — 87491 CHLMYD TRACH DNA AMP PROBE: CPT | Performed by: OBSTETRICS & GYNECOLOGY

## 2024-04-15 NOTE — PROGRESS NOTES
OBSTETRIC HISTORY:   OB History          4    Para   2    Term   1       1    AB   2    Living   1         SAB   2    IAB        Ectopic        Multiple        Live Births   1                  COMPREHENSIVE GYN HISTORY:  PAP History: Reports abnormal Paps.  Infection History: Denies STDs. Denies PID.  Benign History: Denies uterine fibroids. Denies ovarian cysts. Denies endometriosis.   Cancer History: Denies cervical cancer. Denies uterine cancer or hyperplasia. Denies ovarian cancer. Denies vulvar cancer or pre-cancer. Denies vaginal cancer or pre-cancer. Denies breast cancer. Denies colon cancer.  Sexual Activity History:   reports being sexually active and has had partner(s) who are male. She reports using the following method of birth control/protection: See Surgical Hx.   Menstrual History: None since ablation  Contraception: BTL    HPI:   43 y.o.  No LMP recorded. Patient has had an ablation.   Patient is  here for her annual gynecologic exam.  She has painful sex. She denies bladder, breast and bowel complaints.    ROS:  GENERAL: No weight gain or weight loss.   CHEST: No shortness of breath.   ABDOMEN: No abdominal pain, constipation, diarrhea, nausea, vomiting or rectal bleeding.   URINARY: No frequency, dysuria, hematuria, or burning on urination.  REPRODUCTIVE: See HPI.   BREASTS: No breast pain, lumps, or nipple discharge.   PSYCHIATRIC: No depression or anxiety.    PE:   /81   Wt 66 kg (145 lb 8 oz)   BMI 24.98 kg/m²   APPEARANCE: Well nourished, well developed, in no acute distress.  NECK: Neck symmetric without  thyromegaly.  NODES: No inguinal, cervical lymph node enlargement.  CHEST: Lungs clear to auscultation.  HEART: Regular rate and rhythm, no murmurs, rubs or gallops.  ABDOMEN: Soft. No tenderness or masses. No hernias. Asymmetry ASIS  BREASTS: Symmetrical, no skin changes or visible lesions. No palpable masses, nipple discharge or adenopathy bilaterally.  PELVIC:    VULVA: No lesions. Normal female genitalia.  URETHRAL MEATUS: Normal size and location, no lesions, no prolapse.  URETHRA: No masses, tenderness, prolapse or scarring.  VAGINA: Moist and well rugated, no discharge, no significant cystocele or rectocele.  CERVIX: No lesions and discharge.  UTERUS: Normal size, regular shape, mobile, non-tender, bladder base nontender.  ADNEXA: No masses or tenderness. +pelvic floor tenderness    PROCEDURES:  Pap smear  HRHPV  STD testing-- GC/CT, HIV, RPR, Hepatitis B and C       Assessment:  Normal Gynecologic Exam  Dyspareunia--pelvic floor PT  Screen STD    Plan:  Mammogram and Colonoscopy if indicated by current recommendations.  Return to clinic in one year or for any problems or complaints.    Counseling:  Patient was counseled today on A.C.S. Pap guidelines and recommendations for yearly pelvic exams and monthly self breast exams; to see her PCP for other health maintenance. Regular exercise and healthy diet.     As of April 1, 2021, the Cures Act has been passed nationally. This new law requires that all doctors progress notes, lab results, pathology reports and radiology reports be released IMMEDIATELY to the patient in the patient portal. That means that the results are released to you at the EXACT same time they are released to me. Therefore, with all of the patients that I have I am not able to reply to each patient exactly when the results come in. So there will be a delay from when you see the results to when I see them and have time to come up with a response to send you. Also I only see these results when I am on the computer at work. So if the results come in over the weekend or after 5 pm of a work day, I will not see them until the next business day. As you can tell, this is a challenge as a physician to give every patient the quick response they hope for and deserve. So please be patient!     Thanks for understanding,

## 2024-04-16 LAB
C TRACH DNA SPEC QL NAA+PROBE: NOT DETECTED
N GONORRHOEA DNA SPEC QL NAA+PROBE: NOT DETECTED

## 2024-04-17 ENCOUNTER — CLINICAL SUPPORT (OUTPATIENT)
Dept: REHABILITATION | Facility: OTHER | Age: 44
End: 2024-04-17
Payer: MEDICAID

## 2024-04-17 DIAGNOSIS — N94.10 DYSPAREUNIA IN FEMALE: ICD-10-CM

## 2024-04-17 PROCEDURE — 97112 NEUROMUSCULAR REEDUCATION: CPT

## 2024-04-17 PROCEDURE — 97530 THERAPEUTIC ACTIVITIES: CPT

## 2024-04-17 PROCEDURE — 97162 PT EVAL MOD COMPLEX 30 MIN: CPT

## 2024-04-23 LAB
FINAL PATHOLOGIC DIAGNOSIS: ABNORMAL
HPV HR 12 DNA SPEC QL NAA+PROBE: POSITIVE
HPV16 AG SPEC QL: POSITIVE
HPV18 DNA SPEC QL NAA+PROBE: NEGATIVE
Lab: ABNORMAL

## 2024-04-25 ENCOUNTER — PATIENT MESSAGE (OUTPATIENT)
Dept: OBSTETRICS AND GYNECOLOGY | Facility: CLINIC | Age: 44
End: 2024-04-25
Payer: MEDICAID

## 2024-05-02 DIAGNOSIS — N76.0 ACUTE VAGINITIS: ICD-10-CM

## 2024-05-02 RX ORDER — METRONIDAZOLE 7.5 MG/G
GEL VAGINAL
Qty: 70 G | Refills: 11 | Status: SHIPPED | OUTPATIENT
Start: 2024-05-02 | End: 2024-05-02 | Stop reason: SDUPTHER

## 2024-05-02 RX ORDER — METRONIDAZOLE 7.5 MG/G
GEL VAGINAL
Qty: 70 G | Refills: 11 | Status: SHIPPED | OUTPATIENT
Start: 2024-05-02

## 2024-05-15 DIAGNOSIS — R10.84 GENERALIZED ABDOMINAL PAIN: ICD-10-CM

## 2024-05-15 RX ORDER — DICYCLOMINE HYDROCHLORIDE 10 MG/1
10 CAPSULE ORAL 2 TIMES DAILY
Qty: 180 CAPSULE | Refills: 1 | Status: SHIPPED | OUTPATIENT
Start: 2024-05-15

## 2024-06-03 ENCOUNTER — PATIENT MESSAGE (OUTPATIENT)
Dept: OBSTETRICS AND GYNECOLOGY | Facility: CLINIC | Age: 44
End: 2024-06-03
Payer: MEDICAID

## 2024-06-03 ENCOUNTER — TELEPHONE (OUTPATIENT)
Dept: OBSTETRICS AND GYNECOLOGY | Facility: CLINIC | Age: 44
End: 2024-06-03
Payer: MEDICAID

## 2024-06-03 NOTE — TELEPHONE ENCOUNTER
Spoke to patient, she has a weird discharge form her vagina. Has been on medication for years with no relief. She would like to have some answers and get a hysterectomy. She has an abnormal papa in March. Scheduled for a colpo in July. She would like to push is up if at all possible. She will loose her insurance in 2 months.

## 2024-06-03 NOTE — TELEPHONE ENCOUNTER
If patient wants to be seen soon for vaginal discharge and pain see if anyone else has an appointment sooner

## 2024-06-03 NOTE — TELEPHONE ENCOUNTER
Pt was very upset on the phone she wants to talk to someone about having a hysterectomy. I got pt scheduled with  for Thursday.

## 2024-06-03 NOTE — TELEPHONE ENCOUNTER
Scheduled pt with Dr. Chaudhary for Thursday for discharge and to speak about having a hysterectomy. Pt was upset when talking to her about making this appointment pt feels she not being heard.

## 2024-06-06 ENCOUNTER — OFFICE VISIT (OUTPATIENT)
Dept: OBSTETRICS AND GYNECOLOGY | Facility: CLINIC | Age: 44
End: 2024-06-06
Payer: MEDICAID

## 2024-06-06 VITALS
DIASTOLIC BLOOD PRESSURE: 80 MMHG | SYSTOLIC BLOOD PRESSURE: 122 MMHG | BODY MASS INDEX: 24.29 KG/M2 | WEIGHT: 141.56 LBS

## 2024-06-06 DIAGNOSIS — R10.2 PELVIC PAIN: ICD-10-CM

## 2024-06-06 DIAGNOSIS — N89.8 VAGINAL DISCHARGE: Primary | ICD-10-CM

## 2024-06-06 DIAGNOSIS — R10.2 VULVAR PAIN: ICD-10-CM

## 2024-06-06 PROCEDURE — 99213 OFFICE O/P EST LOW 20 MIN: CPT | Mod: PBBFAC,PO | Performed by: STUDENT IN AN ORGANIZED HEALTH CARE EDUCATION/TRAINING PROGRAM

## 2024-06-06 PROCEDURE — 81514 NFCT DS BV&VAGINITIS DNA ALG: CPT | Performed by: STUDENT IN AN ORGANIZED HEALTH CARE EDUCATION/TRAINING PROGRAM

## 2024-06-06 PROCEDURE — 99999 PR PBB SHADOW E&M-EST. PATIENT-LVL III: CPT | Mod: PBBFAC,,, | Performed by: STUDENT IN AN ORGANIZED HEALTH CARE EDUCATION/TRAINING PROGRAM

## 2024-06-06 PROCEDURE — 1159F MED LIST DOCD IN RCRD: CPT | Mod: CPTII,,, | Performed by: STUDENT IN AN ORGANIZED HEALTH CARE EDUCATION/TRAINING PROGRAM

## 2024-06-06 PROCEDURE — 3079F DIAST BP 80-89 MM HG: CPT | Mod: CPTII,,, | Performed by: STUDENT IN AN ORGANIZED HEALTH CARE EDUCATION/TRAINING PROGRAM

## 2024-06-06 PROCEDURE — 99214 OFFICE O/P EST MOD 30 MIN: CPT | Mod: S$PBB,,, | Performed by: STUDENT IN AN ORGANIZED HEALTH CARE EDUCATION/TRAINING PROGRAM

## 2024-06-06 PROCEDURE — 3074F SYST BP LT 130 MM HG: CPT | Mod: CPTII,,, | Performed by: STUDENT IN AN ORGANIZED HEALTH CARE EDUCATION/TRAINING PROGRAM

## 2024-06-06 PROCEDURE — 3008F BODY MASS INDEX DOCD: CPT | Mod: CPTII,,, | Performed by: STUDENT IN AN ORGANIZED HEALTH CARE EDUCATION/TRAINING PROGRAM

## 2024-06-06 RX ORDER — HYDROCORTISONE 1 %
1 CREAM (GRAM) TOPICAL NIGHTLY
Qty: 30 G | Refills: 3 | Status: SHIPPED | OUTPATIENT
Start: 2024-06-06

## 2024-06-06 NOTE — PROGRESS NOTES
CC: Vaginal Discharge    HPI:  Kenya Charles is a 43 y.o. female  presents with complaint of continued vaginal discharge and vulvar irritation. Patient has been seen for this complaint for over a year in our office, she reports going on for over 7 years. Had normal vaginal discharge/lubrication before that time. Had ablation in , BTL in . Reports discharge is thick/white, in past sometimes itchy and odor but not today. Often feels pain and swelling in the vulva. Patient would like to have hysterectomy because she is tired of the vaginal discharge and pain. She cannot have sex due to the pain. She has taken oral and vaginal antibiotics many times without any improvement in her symptoms.     ROS:  GENERAL: No fever, chills, fatigability or weight loss.  VULVAR: see HPI  VAGINAL: no abnormal bleeding and no lesions.  ABDOMEN: No abdominal pain. Denies nausea. Denies vomiting. No diarrhea. No constipation  BREAST: Denies pain. No lumps. No discharge.  URINARY: No incontinence, no nocturia, no frequency and no dysuria.  CARDIOVASCULAR: No chest pain. No shortness of breath. No leg cramps.  NEUROLOGICAL: No headaches. No vision changes.      Patient History:  Past Medical History:   Diagnosis Date    PONV (postoperative nausea and vomiting)      Past Surgical History:   Procedure Laterality Date     SECTION      ENDOMETRIAL ABLATION N/A 3/10/2022    Procedure: ABLATION, ENDOMETRIUM;  Surgeon: Royce Ruiz MD;  Location: [facility] OR;  Service: OB/GYN;  Laterality: N/A;    HYSTEROSCOPY WITH DILATION AND CURETTAGE OF UTERUS N/A 3/10/2022    Procedure: HYSTEROSCOPY, WITH DILATION AND CURETTAGE OF UTERUS;  Surgeon: Royce Ruiz MD;  Location: [facility] OR;  Service: OB/GYN;  Laterality: N/A;    TUBAL LIGATION       Social History     Tobacco Use    Smoking status: Never    Smokeless tobacco: Never   Substance Use Topics    Alcohol use: No    Drug use: Never     Family History   Problem Relation  Name Age of Onset    Hypertension Mother      Cervical cancer Maternal Aunt      Ovarian cancer Maternal Aunt      Cancer Paternal Aunt      Breast cancer Maternal Grandmother       OB History    Para Term  AB Living   4 2 1 1 2 1   SAB IAB Ectopic Multiple Live Births   2       1      # Outcome Date GA Lbr Roger/2nd Weight Sex Type Anes PTL Lv   4   35w0d  2.449 kg (5 lb 6.4 oz) F CS-LTranv  Y CRISTHIAN   3 Term            2 SAB            1 SAB                Objective:   /80   Wt 64.2 kg (141 lb 8.6 oz)   BMI 24.29 kg/m²   No LMP recorded. Patient has had an ablation.      PHYSICAL EXAM:  APPEARANCE: Well nourished, well developed, in no acute distress.  AFFECT: WNL, alert and oriented x 3  SKIN: No acne or hirsutism  NECK: Neck symmetric without masses or thyromegaly  CHEST: Good respiratory effect  PELVIC: Normal external genitalia without lesions.  Normal hair distribution.  Adequate perineal body, normal urethral meatus.  Vagina moist and well rugated without lesions, small amount of white discharge present.  Cervix pink, without lesions, discharge or tenderness.  No significant cystocele or rectocele.    EXTREMITIES: No edema.      ASSESSMENT and PLAN:    ICD-10-CM ICD-9-CM    1. Vaginal discharge  N89.8 623.5 Vaginosis Screen by DNA Probe      2. Vulvar pain  R10.2 625.9       3. Pelvic pain  R10.2 [id number]           Vaginal discharge, vulvar pain, pelvic pain    - affirm collected today  - discussed option to try vaginal steroid suppository to reduce inflammation/irritation (discussed DIV), patient ok with trying this as she has not done so in the past   - reviewed past vaginal testing, US reports and patient surgical history. Pelvic pain may be related to BTL+ablation (PATSS)  - patient desires hysterectomy to address vaginal discharge. Discussed with patient today that hysterectomy can address her pelvic pain and pain with sex, but will not be indicated/appropriate for vaginal  discharge treatment as it would not change the normal vaginal environment or discharge that she has. Counseled patient that her discharge may be exactly the same after surgery, and I cannot guarantee that surgery will completely resolve her pelvic pain either.   - patient desires to proceed with scheduling hysterectomy for pelvic pain/pain with sex.   - counseled patient she would need to complete the colposcopy before surgery to ensure correct type of procedure/follow up and if any cancerous tissue in cervix she would be referred to gyn oncology (discussed low likelihood based on pap LGSIL but she will keep colpo appointment)       Follow up: as needed or for preop once scheduled       Giselle Chaudhary MD  OBGYN [facility]

## 2024-06-12 ENCOUNTER — PATIENT MESSAGE (OUTPATIENT)
Dept: OBSTETRICS AND GYNECOLOGY | Facility: CLINIC | Age: 44
End: 2024-06-12
Payer: MEDICAID

## 2024-06-12 DIAGNOSIS — R10.2 PELVIC PAIN: Primary | ICD-10-CM

## 2024-06-19 ENCOUNTER — PROCEDURE VISIT (OUTPATIENT)
Facility: CLINIC | Age: 44
End: 2024-06-19
Payer: MEDICAID

## 2024-06-19 ENCOUNTER — DOCUMENTATION ONLY (OUTPATIENT)
Dept: OBSTETRICS AND GYNECOLOGY | Facility: CLINIC | Age: 44
End: 2024-06-19
Payer: MEDICAID

## 2024-06-19 VITALS — BODY MASS INDEX: 24.35 KG/M2 | WEIGHT: 141.88 LBS

## 2024-06-19 DIAGNOSIS — R87.612 LGSIL ON PAP SMEAR OF CERVIX: Primary | ICD-10-CM

## 2024-06-19 DIAGNOSIS — B97.7 HPV (HUMAN PAPILLOMA VIRUS) INFECTION: ICD-10-CM

## 2024-06-19 DIAGNOSIS — R10.2 PELVIC PAIN: ICD-10-CM

## 2024-06-19 PROCEDURE — 88305 TISSUE EXAM BY PATHOLOGIST: CPT | Mod: 59 | Performed by: PATHOLOGY

## 2024-06-19 PROCEDURE — 57454 BX/CURETT OF CERVIX W/SCOPE: CPT | Mod: PBBFAC,PN | Performed by: STUDENT IN AN ORGANIZED HEALTH CARE EDUCATION/TRAINING PROGRAM

## 2024-06-19 PROCEDURE — 99499 UNLISTED E&M SERVICE: CPT | Mod: S$PBB,,, | Performed by: STUDENT IN AN ORGANIZED HEALTH CARE EDUCATION/TRAINING PROGRAM

## 2024-06-19 RX ORDER — ESTRADIOL 0.1 MG/G
1 CREAM VAGINAL
Qty: 42.5 G | Refills: 3 | Status: SHIPPED | OUTPATIENT
Start: 2024-06-20

## 2024-06-19 RX ORDER — GABAPENTIN 100 MG/1
100 CAPSULE ORAL 3 TIMES DAILY
Qty: 90 CAPSULE | Refills: 11 | Status: SHIPPED | OUTPATIENT
Start: 2024-06-19 | End: 2025-06-19

## 2024-06-19 NOTE — PROCEDURES
Colposcopy    Date/Time: 6/19/2024 10:15 AM    Performed by: Giselle Chaudhary MD  Authorized by: Giselle Chaudhary MD      Colposcopy Site:  Cervix  Acrowhite Lesion? Yes    Atypical Vessels: No    Transformation Zone Adequate?: No    Biopsy?: Yes         Location:  Cervix ((6 00))  ECC Performed?: Yes    LEEP Performed?: No    Estimated blood loss (cc):  0   Patient tolerated the procedure well with no immediate complications.   Post-operative instructions were provided for the patient.

## 2024-06-19 NOTE — PROGRESS NOTES
CC: abnormal pap, here for colposcopy     HPI:  Kenya Charles is a 43 y.o. female   presents for colposcopy.  No LMP recorded. Patient has had an ablation..  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      ROS:  GENERAL: Denies weight gain or weight loss. Feeling well overall.   SKIN: Denies rash or lesions.   HEAD: Denies head injury or headache.   NODES: Denies enlarged lymph nodes.   CHEST: Denies chest pain or shortness of breath.   CARDIOVASCULAR: Denies palpitations or left sided chest pain.   ABDOMEN: No abdominal pain, constipation, diarrhea, nausea, vomiting or rectal bleeding.   URINARY: No frequency, dysuria, hematuria, or burning on urination.  REPRODUCTIVE: See HPI.   BREASTS: The patient performs breast self-examination and denies pain, lumps, or nipple discharge.   HEMATOLOGIC: No easy bruisability or excessive bleeding.  MUSCULOSKELETAL: Denies joint pain or swelling.   NEUROLOGIC: Denies syncope or weakness.   PSYCHIATRIC: Denies depression, anxiety or mood swings.    Objective:   Wt 64.4 kg (141 lb 13.9 oz)   BMI 24.35 kg/m²       Physical Exam:  APPEARANCE: Well nourished, well developed, in no acute distress.  AFFECT: WNL, alert and oriented x 3  PELVIC: Normal external genitalia without lesions.  Normal hair distribution.  Adequate perineal body, normal urethral meatus.  Vagina moist and well rugated without lesions or discharge.  Cervix pink, without discharge or tenderness. Colpo with biopsy, ECC completed today       Assessment and Plan:    Abnormal pap smear  - The abnormal test findings and HPV infection were discussed. Patient counseled on the need for colposcopy and possible biopsies/ECC to determine further indicated treatments. Discussed minimal risk of bleeding and infection with colposcopy, and alternatives to colposcopy and patient agreed to proceed.    - Post-colposcopy counseling  completed including: manage post-colposcopy cramping with NSAIDs or Tylenol.  Avoid intercourse, douching, or tampons in the vagina for at least 2-3 days.  Expect a clumpy blackish discharge due to Monsel's solution application for several days.  Report heavy bleeding, worsening pain or pain that does not respond to above medications, or foul-smelling vaginal discharge.   - Follow up pending colposcopy results.      Stephanie Heaney, MD OBGYN [facility]

## 2024-06-19 NOTE — PROGRESS NOTES
PA in epic completed via cover my meds for gabapentin     Information regarding your request  GABAPENTIN 100 MG CAPSULE is covered for this Beneficiary without Prior Authorization.         ANTHONY FERNANDEZ (Key: [id number])  PA Case ID #: [id number]  Need Help? Call us at [phone]  Outcome  Additional Information Required  GABAPENTIN 100 MG CAPSULE is covered for this Beneficiary without Prior Authorization.

## 2024-06-21 LAB
FINAL PATHOLOGIC DIAGNOSIS: NORMAL
GROSS: NORMAL
Lab: NORMAL

## 2024-07-03 ENCOUNTER — TELEPHONE (OUTPATIENT)
Dept: OBSTETRICS AND GYNECOLOGY | Facility: CLINIC | Age: 44
End: 2024-07-03
Payer: MEDICAID

## 2024-07-03 NOTE — TELEPHONE ENCOUNTER
----- Message from Sandy Stafford MA sent at 7/3/2024  9:33 AM CDT -----    ----- Message -----  From: Bernie Schneider  Sent: 7/3/2024   8:49 AM CDT  To: Giselle Chaudhary Staff    Type:  Patient Returning Call    Who Called:pt  Who Left Message for Patient:Belem Royal MA  Does the patient know what this is regarding?:surgery   Would the patient rather a call back or a response via [facility]? Call   Best Call Back Number:[phone]  Additional Information:

## 2024-08-15 ENCOUNTER — OFFICE VISIT (OUTPATIENT)
Dept: OBSTETRICS AND GYNECOLOGY | Facility: CLINIC | Age: 44
End: 2024-08-15
Payer: MEDICAID

## 2024-08-15 VITALS
BODY MASS INDEX: 24.41 KG/M2 | DIASTOLIC BLOOD PRESSURE: 77 MMHG | SYSTOLIC BLOOD PRESSURE: 113 MMHG | WEIGHT: 142.19 LBS

## 2024-08-15 DIAGNOSIS — R10.2 PELVIC PAIN: ICD-10-CM

## 2024-08-15 DIAGNOSIS — G89.18 POST-OP PAIN: ICD-10-CM

## 2024-08-15 DIAGNOSIS — Z01.818 PREOP EXAMINATION: Primary | ICD-10-CM

## 2024-08-15 PROCEDURE — 99213 OFFICE O/P EST LOW 20 MIN: CPT | Mod: PBBFAC,PO | Performed by: STUDENT IN AN ORGANIZED HEALTH CARE EDUCATION/TRAINING PROGRAM

## 2024-08-15 PROCEDURE — 99499 UNLISTED E&M SERVICE: CPT | Mod: S$PBB,,, | Performed by: STUDENT IN AN ORGANIZED HEALTH CARE EDUCATION/TRAINING PROGRAM

## 2024-08-15 PROCEDURE — 99999 PR PBB SHADOW E&M-EST. PATIENT-LVL III: CPT | Mod: PBBFAC,,, | Performed by: STUDENT IN AN ORGANIZED HEALTH CARE EDUCATION/TRAINING PROGRAM

## 2024-08-15 RX ORDER — FAMOTIDINE 20 MG/1
20 TABLET, FILM COATED ORAL
OUTPATIENT
Start: 2024-08-15

## 2024-08-15 RX ORDER — PHENAZOPYRIDINE HYDROCHLORIDE 100 MG/1
200 TABLET, FILM COATED ORAL
OUTPATIENT
Start: 2024-08-15 | End: 2024-08-15

## 2024-08-15 RX ORDER — ONDANSETRON 4 MG/1
8 TABLET, ORALLY DISINTEGRATING ORAL EVERY 8 HOURS PRN
Qty: 15 TABLET | Refills: 0 | Status: SHIPPED | OUTPATIENT
Start: 2024-08-15

## 2024-08-15 RX ORDER — CEFAZOLIN SODIUM 2 G/50ML
2 SOLUTION INTRAVENOUS
OUTPATIENT
Start: 2024-08-15

## 2024-08-15 RX ORDER — GABAPENTIN 100 MG/1
100 CAPSULE ORAL 3 TIMES DAILY
Qty: 42 CAPSULE | Refills: 0 | Status: SHIPPED | OUTPATIENT
Start: 2024-08-15 | End: 2024-08-29

## 2024-08-15 RX ORDER — SODIUM CHLORIDE 9 MG/ML
INJECTION, SOLUTION INTRAVENOUS CONTINUOUS
OUTPATIENT
Start: 2024-08-15

## 2024-08-15 RX ORDER — MUPIROCIN 20 MG/G
OINTMENT TOPICAL
OUTPATIENT
Start: 2024-08-15

## 2024-08-15 RX ORDER — DOCUSATE SODIUM 100 MG/1
100 CAPSULE, LIQUID FILLED ORAL DAILY PRN
Qty: 30 CAPSULE | Refills: 1 | Status: SHIPPED | OUTPATIENT
Start: 2024-08-15 | End: 2025-08-15

## 2024-08-15 RX ORDER — HYDROCODONE BITARTRATE AND ACETAMINOPHEN 5; 325 MG/1; MG/1
1 TABLET ORAL EVERY 6 HOURS PRN
Qty: 20 TABLET | Refills: 0 | Status: SHIPPED | OUTPATIENT
Start: 2024-08-15

## 2024-08-15 RX ORDER — SCOLOPAMINE TRANSDERMAL SYSTEM 1 MG/1
1 PATCH, EXTENDED RELEASE TRANSDERMAL
Qty: 1 PATCH | Refills: 0 | Status: SHIPPED | OUTPATIENT
Start: 2024-08-15

## 2024-08-15 NOTE — PROGRESS NOTES
CC: pre op     HPI:  Kenya Charles is a 43 y.o. female  presents for pre op visit. Patient is scheduled for TL[facility] BS cysto on 24 for pelvic pain     Patient feels well today, has no complaints. Pain is actually much better right now, feeling well overall.     ROS:  GENERAL: No fever, chills, fatigability or weight loss.  VULVAR: No pain, no lesions and no itching.  VAGINAL: No relaxation, no itching, no discharge, no abnormal bleeding and no lesions.  ABDOMEN: No abdominal pain. Denies nausea. Denies vomiting. No diarrhea. No constipation  BREAST: Denies pain. No lumps. No discharge.  URINARY: No incontinence, no nocturia, no frequency and no dysuria.  CARDIOVASCULAR: No chest pain. No shortness of breath. No leg cramps.  NEUROLOGICAL: No headaches. No vision changes.      Patient History:  Past Medical History:   Diagnosis Date    PONV (postoperative nausea and vomiting)      Past Surgical History:   Procedure Laterality Date     SECTION      ENDOMETRIAL ABLATION N/A 3/10/2022    Procedure: ABLATION, ENDOMETRIUM;  Surgeon: Royce Ruiz MD;  Location: [facility] OR;  Service: OB/GYN;  Laterality: N/A;    HYSTEROSCOPY WITH DILATION AND CURETTAGE OF UTERUS N/A 3/10/2022    Procedure: HYSTEROSCOPY, WITH DILATION AND CURETTAGE OF UTERUS;  Surgeon: Royce Ruiz MD;  Location: [facility] OR;  Service: OB/GYN;  Laterality: N/A;    TUBAL LIGATION       Social History     Tobacco Use    Smoking status: Never    Smokeless tobacco: Never   Substance Use Topics    Alcohol use: No    Drug use: Never     Family History   Problem Relation Name Age of Onset    Hypertension Mother      Cervical cancer Maternal Aunt      Ovarian cancer Maternal Aunt      Cancer Paternal Aunt      Breast cancer Maternal Grandmother       OB History    Para Term  AB Living   4 2 1 1 2 1   SAB IAB Ectopic Multiple Live Births   2       1      # Outcome Date GA Lbr Roger/2nd Weight Sex Type Anes PTL Lv   4   2006 35w0d  2.449 kg (5 lb 6.4 oz) F CS-LTranv  Y CRISTHIAN   3 Term            2 SAB            1 SAB                Objective:   /77   Wt 64.5 kg (142 lb 3.2 oz)   BMI 24.41 kg/m²   No LMP recorded. Patient has had an ablation.      PHYSICAL EXAM:  APPEARANCE: Well nourished, well developed, in no acute distress.  AFFECT: WNL, alert and oriented x 3  SKIN: No acne or hirsutism  NECK: Neck symmetric without masses or thyromegaly  CHEST: Good respiratory effect  EXTREMITIES: No edema.      ASSESSMENT and PLAN:    ICD-10-CM ICD-9-CM    1. Preop examination  Z01.818 V72.84       2. Post-op pain  G89.18 338.18 docusate sodium (COLACE) 100 MG capsule      gabapentin (NEURONTIN) 100 MG capsule      ondansetron (ZOFRAN-ODT) 4 MG TbDL      scopolamine (TRANSDERM-SCOP) 1.3-1.5 mg (1 mg over 3 days)      HYDROcodone-acetaminophen (NORCO) 5-325 mg per tablet          Preop visit   - scheduled for TLH BS cysto on 8/22/24 for pelvic pain   - R/B/A of the various treatment options have been discussed and the patient has decided to proceed with surgical intervention. We discussed the various risk associated with gynecologic surgical procedures, including but not limited to, infection, bleeding, anesthetic complications, venous thromboembolism, and cardiovascular events. We discussed the possible need for blood transfusion and the risk of associated complications, including blood born infections. We discussed the risk of major vessel injury, gastrointestinal, urologic, and neurologic complications. We discussed the risk of bowel injury. The expected post-operative course was discussed and all the patient's questions were answered.   - procedure consent forms and blood transfusion consent forms signed, scanned into chart  - preop labs/ordered sign and held  - post op scripts ordered today for patient to  prior to surgery   - patient counseled NPO after midnight day prior to surgery, present at least 2 hours prior to  scheduled time, preop will call patient for additional instructions   - patient instructed to call office or send message if she has any additional questions or concerns prior to planned procedure       Follow up: post op visit as scheduled       Stephanie Heaney, MD OBGYN [facility]

## 2024-08-15 NOTE — H&P (VIEW-ONLY)
CC: pre op     HPI:  Kenya Charles is a 43 y.o. female  presents for pre op visit. Patient is scheduled for TL[facility] BS cysto on 24 for pelvic pain     Patient feels well today, has no complaints. Pain is actually much better right now, feeling well overall.     ROS:  GENERAL: No fever, chills, fatigability or weight loss.  VULVAR: No pain, no lesions and no itching.  VAGINAL: No relaxation, no itching, no discharge, no abnormal bleeding and no lesions.  ABDOMEN: No abdominal pain. Denies nausea. Denies vomiting. No diarrhea. No constipation  BREAST: Denies pain. No lumps. No discharge.  URINARY: No incontinence, no nocturia, no frequency and no dysuria.  CARDIOVASCULAR: No chest pain. No shortness of breath. No leg cramps.  NEUROLOGICAL: No headaches. No vision changes.      Patient History:  Past Medical History:   Diagnosis Date    PONV (postoperative nausea and vomiting)      Past Surgical History:   Procedure Laterality Date     SECTION      ENDOMETRIAL ABLATION N/A 3/10/2022    Procedure: ABLATION, ENDOMETRIUM;  Surgeon: Royce Ruiz MD;  Location: [facility] OR;  Service: OB/GYN;  Laterality: N/A;    HYSTEROSCOPY WITH DILATION AND CURETTAGE OF UTERUS N/A 3/10/2022    Procedure: HYSTEROSCOPY, WITH DILATION AND CURETTAGE OF UTERUS;  Surgeon: Royce Ruiz MD;  Location: [facility] OR;  Service: OB/GYN;  Laterality: N/A;    TUBAL LIGATION       Social History     Tobacco Use    Smoking status: Never    Smokeless tobacco: Never   Substance Use Topics    Alcohol use: No    Drug use: Never     Family History   Problem Relation Name Age of Onset    Hypertension Mother      Cervical cancer Maternal Aunt      Ovarian cancer Maternal Aunt      Cancer Paternal Aunt      Breast cancer Maternal Grandmother       OB History    Para Term  AB Living   4 2 1 1 2 1   SAB IAB Ectopic Multiple Live Births   2       1      # Outcome Date GA Lbr Roger/2nd Weight Sex Type Anes PTL Lv   4   2006 35w0d  2.449 kg (5 lb 6.4 oz) F CS-LTranv  Y CRISTHIAN   3 Term            2 SAB            1 SAB                Objective:   /77   Wt 64.5 kg (142 lb 3.2 oz)   BMI 24.41 kg/m²   No LMP recorded. Patient has had an ablation.      PHYSICAL EXAM:  APPEARANCE: Well nourished, well developed, in no acute distress.  AFFECT: WNL, alert and oriented x 3  SKIN: No acne or hirsutism  NECK: Neck symmetric without masses or thyromegaly  CHEST: Good respiratory effect  EXTREMITIES: No edema.      ASSESSMENT and PLAN:    ICD-10-CM ICD-9-CM    1. Preop examination  Z01.818 V72.84       2. Post-op pain  G89.18 338.18 docusate sodium (COLACE) 100 MG capsule      gabapentin (NEURONTIN) 100 MG capsule      ondansetron (ZOFRAN-ODT) 4 MG TbDL      scopolamine (TRANSDERM-SCOP) 1.3-1.5 mg (1 mg over 3 days)      HYDROcodone-acetaminophen (NORCO) 5-325 mg per tablet          Preop visit   - scheduled for TLH BS cysto on 8/22/24 for pelvic pain   - R/B/A of the various treatment options have been discussed and the patient has decided to proceed with surgical intervention. We discussed the various risk associated with gynecologic surgical procedures, including but not limited to, infection, bleeding, anesthetic complications, venous thromboembolism, and cardiovascular events. We discussed the possible need for blood transfusion and the risk of associated complications, including blood born infections. We discussed the risk of major vessel injury, gastrointestinal, urologic, and neurologic complications. We discussed the risk of bowel injury. The expected post-operative course was discussed and all the patient's questions were answered.   - procedure consent forms and blood transfusion consent forms signed, scanned into chart  - preop labs/ordered sign and held  - post op scripts ordered today for patient to  prior to surgery   - patient counseled NPO after midnight day prior to surgery, present at least 2 hours prior to  scheduled time, preop will call patient for additional instructions   - patient instructed to call office or send message if she has any additional questions or concerns prior to planned procedure       Follow up: post op visit as scheduled       Stephanie Heaney, MD OBGYN [facility]

## 2024-08-21 ENCOUNTER — ANESTHESIA EVENT (OUTPATIENT)
Dept: SURGERY | Facility: HOSPITAL | Age: 44
End: 2024-08-21
Payer: MEDICAID

## 2024-08-22 ENCOUNTER — HOSPITAL ENCOUNTER (OUTPATIENT)
Facility: HOSPITAL | Age: 44
Discharge: HOME | End: 2024-08-22
Attending: STUDENT IN AN ORGANIZED HEALTH CARE EDUCATION/TRAINING PROGRAM | Admitting: STUDENT IN AN ORGANIZED HEALTH CARE EDUCATION/TRAINING PROGRAM
Payer: MEDICAID

## 2024-08-22 ENCOUNTER — ANESTHESIA (OUTPATIENT)
Dept: SURGERY | Facility: HOSPITAL | Age: 44
End: 2024-08-22
Payer: MEDICAID

## 2024-08-22 VITALS
DIASTOLIC BLOOD PRESSURE: 61 MMHG | HEART RATE: 73 BPM | WEIGHT: 142 LBS | SYSTOLIC BLOOD PRESSURE: 101 MMHG | HEIGHT: 64 IN | RESPIRATION RATE: 18 BRPM | BODY MASS INDEX: 24.24 KG/M2 | OXYGEN SATURATION: 94 % | TEMPERATURE: 98 F

## 2024-08-22 DIAGNOSIS — R10.2 PELVIC PAIN: Primary | ICD-10-CM

## 2024-08-22 LAB
ABO + RH BLD: NORMAL
ALBUMIN SERPL BCP-MCNC: 4.3 G/DL (ref 3.5–5.2)
ALP SERPL-CCNC: 72 U/L (ref 55–135)
ALT SERPL W/O P-5'-P-CCNC: 20 U/L (ref 10–44)
ANION GAP SERPL CALC-SCNC: 9 MMOL/L (ref 8–16)
AST SERPL-CCNC: 20 U/L (ref 10–40)
B-HCG UR QL: NEGATIVE
BASOPHILS # BLD AUTO: 0.03 K/UL (ref 0–0.2)
BASOPHILS NFR BLD: 0.5 % (ref 0–1.9)
BILIRUB SERPL-MCNC: 0.6 MG/DL (ref 0.1–1)
BLD GP AB SCN CELLS X3 SERPL QL: NORMAL
BUN SERPL-MCNC: 15 MG/DL (ref 6–20)
CALCIUM SERPL-MCNC: 9.5 MG/DL (ref 8.7–10.5)
CHLORIDE SERPL-SCNC: 106 MMOL/L (ref 95–110)
CO2 SERPL-SCNC: 24 MMOL/L (ref 23–29)
CREAT SERPL-MCNC: 0.8 MG/DL (ref 0.5–1.4)
CTP QC/QA: YES
DIFFERENTIAL METHOD BLD: ABNORMAL
EOSINOPHIL # BLD AUTO: 0.1 K/UL (ref 0–0.5)
EOSINOPHIL NFR BLD: 1.5 % (ref 0–8)
ERYTHROCYTE [DISTWIDTH] IN BLOOD BY AUTOMATED COUNT: 11.8 % (ref 11.5–14.5)
EST. GFR  (NO RACE VARIABLE): >60 ML/MIN/1.73 M^2
GLUCOSE SERPL-MCNC: 98 MG/DL (ref 70–110)
HCT VFR BLD AUTO: 41.1 % (ref 37–48.5)
HGB BLD-MCNC: 13.8 G/DL (ref 12–16)
IMM GRANULOCYTES # BLD AUTO: 0.04 K/UL (ref 0–0.04)
IMM GRANULOCYTES NFR BLD AUTO: 0.7 % (ref 0–0.5)
LYMPHOCYTES # BLD AUTO: 1.5 K/UL (ref 1–4.8)
LYMPHOCYTES NFR BLD: 24.6 % (ref 18–48)
MCH RBC QN AUTO: 31.2 PG (ref 27–31)
MCHC RBC AUTO-ENTMCNC: 33.6 G/DL (ref 32–36)
MCV RBC AUTO: 93 FL (ref 82–98)
MONOCYTES # BLD AUTO: 0.5 K/UL (ref 0.3–1)
MONOCYTES NFR BLD: 7.5 % (ref 4–15)
NEUTROPHILS # BLD AUTO: 3.9 K/UL (ref 1.8–7.7)
NEUTROPHILS NFR BLD: 65.2 % (ref 38–73)
NRBC BLD-RTO: 0 /100 WBC
PLATELET # BLD AUTO: 264 K/UL (ref 150–450)
PMV BLD AUTO: 9.9 FL (ref 9.2–12.9)
POCT GLUCOSE: 94 MG/DL (ref 70–110)
POTASSIUM SERPL-SCNC: 3.8 MMOL/L (ref 3.5–5.1)
PROT SERPL-MCNC: 7.4 G/DL (ref 6–8.4)
RBC # BLD AUTO: 4.42 M/UL (ref 4–5.4)
SODIUM SERPL-SCNC: 139 MMOL/L (ref 136–145)
SPECIMEN OUTDATE: NORMAL
WBC # BLD AUTO: 6.02 K/UL (ref 3.9–12.7)

## 2024-08-22 PROCEDURE — 27201423 OPTIME MED/SURG SUP & DEVICES STERILE SUPPLY: Performed by: STUDENT IN AN ORGANIZED HEALTH CARE EDUCATION/TRAINING PROGRAM

## 2024-08-22 PROCEDURE — 37000008 HC ANESTHESIA 1ST 15 MINUTES: Performed by: STUDENT IN AN ORGANIZED HEALTH CARE EDUCATION/TRAINING PROGRAM

## 2024-08-22 PROCEDURE — 86900 BLOOD TYPING SEROLOGIC ABO: CPT | Performed by: STUDENT IN AN ORGANIZED HEALTH CARE EDUCATION/TRAINING PROGRAM

## 2024-08-22 PROCEDURE — 58571 TLH W/T/O 250 G OR LESS: CPT | Mod: ,,, | Performed by: STUDENT IN AN ORGANIZED HEALTH CARE EDUCATION/TRAINING PROGRAM

## 2024-08-22 PROCEDURE — 86901 BLOOD TYPING SEROLOGIC RH(D): CPT | Performed by: STUDENT IN AN ORGANIZED HEALTH CARE EDUCATION/TRAINING PROGRAM

## 2024-08-22 PROCEDURE — 36000711: Performed by: STUDENT IN AN ORGANIZED HEALTH CARE EDUCATION/TRAINING PROGRAM

## 2024-08-22 PROCEDURE — 36000710: Performed by: STUDENT IN AN ORGANIZED HEALTH CARE EDUCATION/TRAINING PROGRAM

## 2024-08-22 PROCEDURE — 36415 COLL VENOUS BLD VENIPUNCTURE: CPT | Performed by: STUDENT IN AN ORGANIZED HEALTH CARE EDUCATION/TRAINING PROGRAM

## 2024-08-22 PROCEDURE — 71000016 HC POSTOP RECOV ADDL HR: Performed by: STUDENT IN AN ORGANIZED HEALTH CARE EDUCATION/TRAINING PROGRAM

## 2024-08-22 PROCEDURE — C2628 CATHETER, OCCLUSION: HCPCS | Performed by: STUDENT IN AN ORGANIZED HEALTH CARE EDUCATION/TRAINING PROGRAM

## 2024-08-22 PROCEDURE — 63600175 PHARM REV CODE 636 W HCPCS: Performed by: STUDENT IN AN ORGANIZED HEALTH CARE EDUCATION/TRAINING PROGRAM

## 2024-08-22 PROCEDURE — D9220A PRA ANESTHESIA: Mod: CRNA,,, | Performed by: NURSE ANESTHETIST, CERTIFIED REGISTERED

## 2024-08-22 PROCEDURE — 25000003 PHARM REV CODE 250: Performed by: NURSE ANESTHETIST, CERTIFIED REGISTERED

## 2024-08-22 PROCEDURE — 81025 URINE PREGNANCY TEST: CPT | Performed by: STUDENT IN AN ORGANIZED HEALTH CARE EDUCATION/TRAINING PROGRAM

## 2024-08-22 PROCEDURE — 71000039 HC RECOVERY, EACH ADD'L HOUR: Performed by: STUDENT IN AN ORGANIZED HEALTH CARE EDUCATION/TRAINING PROGRAM

## 2024-08-22 PROCEDURE — 63600175 PHARM REV CODE 636 W HCPCS: Performed by: ANESTHESIOLOGY

## 2024-08-22 PROCEDURE — 80053 COMPREHEN METABOLIC PANEL: CPT | Performed by: STUDENT IN AN ORGANIZED HEALTH CARE EDUCATION/TRAINING PROGRAM

## 2024-08-22 PROCEDURE — 71000033 HC RECOVERY, INTIAL HOUR: Performed by: STUDENT IN AN ORGANIZED HEALTH CARE EDUCATION/TRAINING PROGRAM

## 2024-08-22 PROCEDURE — 71000015 HC POSTOP RECOV 1ST HR: Performed by: STUDENT IN AN ORGANIZED HEALTH CARE EDUCATION/TRAINING PROGRAM

## 2024-08-22 PROCEDURE — 85025 COMPLETE CBC W/AUTO DIFF WBC: CPT | Performed by: STUDENT IN AN ORGANIZED HEALTH CARE EDUCATION/TRAINING PROGRAM

## 2024-08-22 PROCEDURE — 88307 TISSUE EXAM BY PATHOLOGIST: CPT | Performed by: PATHOLOGY

## 2024-08-22 PROCEDURE — 88307 TISSUE EXAM BY PATHOLOGIST: CPT | Mod: 26,,, | Performed by: PATHOLOGY

## 2024-08-22 PROCEDURE — 25000003 PHARM REV CODE 250: Performed by: STUDENT IN AN ORGANIZED HEALTH CARE EDUCATION/TRAINING PROGRAM

## 2024-08-22 PROCEDURE — 37000009 HC ANESTHESIA EA ADD 15 MINS: Performed by: STUDENT IN AN ORGANIZED HEALTH CARE EDUCATION/TRAINING PROGRAM

## 2024-08-22 PROCEDURE — 58571 TLH W/T/O 250 G OR LESS: CPT | Mod: 80,,, | Performed by: OBSTETRICS & GYNECOLOGY

## 2024-08-22 PROCEDURE — 63600175 PHARM REV CODE 636 W HCPCS: Performed by: NURSE ANESTHETIST, CERTIFIED REGISTERED

## 2024-08-22 PROCEDURE — 86850 RBC ANTIBODY SCREEN: CPT | Performed by: STUDENT IN AN ORGANIZED HEALTH CARE EDUCATION/TRAINING PROGRAM

## 2024-08-22 PROCEDURE — D9220A PRA ANESTHESIA: Mod: ANES,,, | Performed by: ANESTHESIOLOGY

## 2024-08-22 RX ORDER — PHENAZOPYRIDINE HYDROCHLORIDE 100 MG/1
200 TABLET, FILM COATED ORAL
Status: DISCONTINUED | OUTPATIENT
Start: 2024-08-22 | End: 2024-08-22

## 2024-08-22 RX ORDER — HYDROMORPHONE HYDROCHLORIDE 2 MG/ML
0.5 INJECTION, SOLUTION INTRAMUSCULAR; INTRAVENOUS; SUBCUTANEOUS EVERY 5 MIN PRN
Status: DISCONTINUED | OUTPATIENT
Start: 2024-08-22 | End: 2024-08-22 | Stop reason: HOSPADM

## 2024-08-22 RX ORDER — SODIUM CHLORIDE 9 MG/ML
INJECTION, SOLUTION INTRAVENOUS CONTINUOUS
Status: DISCONTINUED | OUTPATIENT
Start: 2024-08-22 | End: 2024-08-22 | Stop reason: HOSPADM

## 2024-08-22 RX ORDER — MUPIROCIN 20 MG/G
OINTMENT TOPICAL
Status: DISCONTINUED | OUTPATIENT
Start: 2024-08-22 | End: 2024-08-22 | Stop reason: HOSPADM

## 2024-08-22 RX ORDER — KETOROLAC TROMETHAMINE 30 MG/ML
INJECTION, SOLUTION INTRAMUSCULAR; INTRAVENOUS
Status: DISCONTINUED | OUTPATIENT
Start: 2024-08-22 | End: 2024-08-22

## 2024-08-22 RX ORDER — ONDANSETRON HYDROCHLORIDE 2 MG/ML
INJECTION, SOLUTION INTRAVENOUS
Status: DISCONTINUED | OUTPATIENT
Start: 2024-08-22 | End: 2024-08-22

## 2024-08-22 RX ORDER — GLUCAGON 1 MG
1 KIT INJECTION
Status: DISCONTINUED | OUTPATIENT
Start: 2024-08-22 | End: 2024-08-22 | Stop reason: HOSPADM

## 2024-08-22 RX ORDER — KETAMINE HCL IN 0.9 % NACL 50 MG/5 ML
SYRINGE (ML) INTRAVENOUS
Status: DISCONTINUED | OUTPATIENT
Start: 2024-08-22 | End: 2024-08-22

## 2024-08-22 RX ORDER — HALOPERIDOL 5 MG/ML
INJECTION INTRAMUSCULAR
Status: DISCONTINUED | OUTPATIENT
Start: 2024-08-22 | End: 2024-08-22

## 2024-08-22 RX ORDER — MIDAZOLAM HYDROCHLORIDE 1 MG/ML
INJECTION INTRAMUSCULAR; INTRAVENOUS
Status: DISCONTINUED | OUTPATIENT
Start: 2024-08-22 | End: 2024-08-22

## 2024-08-22 RX ORDER — ACETAMINOPHEN 10 MG/ML
INJECTION, SOLUTION INTRAVENOUS
Status: DISCONTINUED | OUTPATIENT
Start: 2024-08-22 | End: 2024-08-22

## 2024-08-22 RX ORDER — PROPOFOL 10 MG/ML
VIAL (ML) INTRAVENOUS CONTINUOUS PRN
Status: DISCONTINUED | OUTPATIENT
Start: 2024-08-22 | End: 2024-08-22

## 2024-08-22 RX ORDER — HYDROMORPHONE HYDROCHLORIDE 2 MG/ML
INJECTION, SOLUTION INTRAMUSCULAR; INTRAVENOUS; SUBCUTANEOUS
Status: DISCONTINUED | OUTPATIENT
Start: 2024-08-22 | End: 2024-08-22

## 2024-08-22 RX ORDER — HYDROCODONE BITARTRATE 10 MG/1
1 CAPSULE, EXTENDED RELEASE ORAL EVERY 12 HOURS
Qty: 15 EACH | Refills: 0 | Status: SHIPPED | OUTPATIENT
Start: 2024-08-22 | End: 2024-08-22 | Stop reason: HOSPADM

## 2024-08-22 RX ORDER — FENTANYL CITRATE 50 UG/ML
INJECTION, SOLUTION INTRAMUSCULAR; INTRAVENOUS
Status: DISCONTINUED | OUTPATIENT
Start: 2024-08-22 | End: 2024-08-22

## 2024-08-22 RX ORDER — PROPOFOL 10 MG/ML
VIAL (ML) INTRAVENOUS
Status: DISCONTINUED | OUTPATIENT
Start: 2024-08-22 | End: 2024-08-22

## 2024-08-22 RX ORDER — ONDANSETRON HYDROCHLORIDE 2 MG/ML
4 INJECTION, SOLUTION INTRAVENOUS ONCE AS NEEDED
Status: DISCONTINUED | OUTPATIENT
Start: 2024-08-22 | End: 2024-08-22 | Stop reason: HOSPADM

## 2024-08-22 RX ORDER — SODIUM CHLORIDE 0.9 % (FLUSH) 0.9 %
10 SYRINGE (ML) INJECTION
Status: DISCONTINUED | OUTPATIENT
Start: 2024-08-22 | End: 2024-08-22 | Stop reason: HOSPADM

## 2024-08-22 RX ORDER — TRAMADOL HYDROCHLORIDE 50 MG/1
50 TABLET ORAL EVERY 4 HOURS PRN
Qty: 30 TABLET | Refills: 0 | Status: SHIPPED | OUTPATIENT
Start: 2024-08-22

## 2024-08-22 RX ORDER — FAMOTIDINE 20 MG/1
20 TABLET, FILM COATED ORAL
Status: COMPLETED | OUTPATIENT
Start: 2024-08-22 | End: 2024-08-22

## 2024-08-22 RX ORDER — ROCURONIUM BROMIDE 10 MG/ML
INJECTION, SOLUTION INTRAVENOUS
Status: DISCONTINUED | OUTPATIENT
Start: 2024-08-22 | End: 2024-08-22

## 2024-08-22 RX ORDER — DEXAMETHASONE SODIUM PHOSPHATE 4 MG/ML
INJECTION, SOLUTION INTRA-ARTICULAR; INTRALESIONAL; INTRAMUSCULAR; INTRAVENOUS; SOFT TISSUE
Status: DISCONTINUED | OUTPATIENT
Start: 2024-08-22 | End: 2024-08-22

## 2024-08-22 RX ORDER — CEFAZOLIN SODIUM 2 G/50ML
2 SOLUTION INTRAVENOUS
Status: COMPLETED | OUTPATIENT
Start: 2024-08-22 | End: 2024-08-22

## 2024-08-22 RX ORDER — LIDOCAINE HYDROCHLORIDE 20 MG/ML
INJECTION, SOLUTION EPIDURAL; INFILTRATION; INTRACAUDAL; PERINEURAL
Status: DISCONTINUED | OUTPATIENT
Start: 2024-08-22 | End: 2024-08-22

## 2024-08-22 RX ORDER — TRAMADOL HYDROCHLORIDE 50 MG/1
50 TABLET ORAL ONCE
Status: COMPLETED | OUTPATIENT
Start: 2024-08-22 | End: 2024-08-22

## 2024-08-22 RX ADMIN — ROCURONIUM BROMIDE 50 MG: 10 INJECTION, SOLUTION INTRAVENOUS at 07:08

## 2024-08-22 RX ADMIN — SODIUM CHLORIDE, SODIUM LACTATE, POTASSIUM CHLORIDE, AND CALCIUM CHLORIDE: .6; .31; .03; .02 INJECTION, SOLUTION INTRAVENOUS at 06:08

## 2024-08-22 RX ADMIN — HALOPERIDOL LACTATE 1 MG: 5 INJECTION, SOLUTION INTRAMUSCULAR at 07:08

## 2024-08-22 RX ADMIN — KETOROLAC TROMETHAMINE 30 MG: 30 INJECTION, SOLUTION INTRAMUSCULAR; INTRAVENOUS at 08:08

## 2024-08-22 RX ADMIN — ACETAMINOPHEN 1000 MG: 10 INJECTION INTRAVENOUS at 08:08

## 2024-08-22 RX ADMIN — FAMOTIDINE 20 MG: 20 TABLET ORAL at 06:08

## 2024-08-22 RX ADMIN — ONDANSETRON 8 MG: 2 INJECTION, SOLUTION INTRAMUSCULAR; INTRAVENOUS at 08:08

## 2024-08-22 RX ADMIN — Medication 25 MG: at 07:08

## 2024-08-22 RX ADMIN — MIDAZOLAM HYDROCHLORIDE 2 MG: 1 INJECTION, SOLUTION INTRAMUSCULAR; INTRAVENOUS at 06:08

## 2024-08-22 RX ADMIN — SUGAMMADEX 200 MG: 100 INJECTION, SOLUTION INTRAVENOUS at 08:08

## 2024-08-22 RX ADMIN — HYDROMORPHONE HYDROCHLORIDE 1 MG: 2 INJECTION INTRAMUSCULAR; INTRAVENOUS; SUBCUTANEOUS at 07:08

## 2024-08-22 RX ADMIN — PROPOFOL 150 MCG/KG/MIN: 10 INJECTION, EMULSION INTRAVENOUS at 07:08

## 2024-08-22 RX ADMIN — TRAMADOL HYDROCHLORIDE 50 MG: 50 TABLET, COATED ORAL at 09:08

## 2024-08-22 RX ADMIN — DEXAMETHASONE SODIUM PHOSPHATE 8 MG: 4 INJECTION, SOLUTION INTRA-ARTICULAR; INTRALESIONAL; INTRAMUSCULAR; INTRAVENOUS; SOFT TISSUE at 07:08

## 2024-08-22 RX ADMIN — ROCURONIUM BROMIDE 10 MG: 10 INJECTION, SOLUTION INTRAVENOUS at 07:08

## 2024-08-22 RX ADMIN — HYDROMORPHONE HYDROCHLORIDE 0.5 MG: 2 INJECTION INTRAMUSCULAR; INTRAVENOUS; SUBCUTANEOUS at 08:08

## 2024-08-22 RX ADMIN — MUPIROCIN: 20 OINTMENT TOPICAL at 06:08

## 2024-08-22 RX ADMIN — LIDOCAINE HYDROCHLORIDE 80 MG: 20 INJECTION, SOLUTION EPIDURAL; INFILTRATION; INTRACAUDAL; PERINEURAL at 07:08

## 2024-08-22 RX ADMIN — PROPOFOL 150 MG: 10 INJECTION, EMULSION INTRAVENOUS at 07:08

## 2024-08-22 RX ADMIN — CEFAZOLIN SODIUM 2 G: 2 SOLUTION INTRAVENOUS at 07:08

## 2024-08-22 RX ADMIN — FENTANYL CITRATE 100 MCG: 50 INJECTION INTRAMUSCULAR; INTRAVENOUS at 07:08

## 2024-08-22 NOTE — TRANSFER OF CARE
"Anesthesia Transfer of Care Note    Patient: Kenya Charles    Procedure(s) Performed: Procedure(s) (LRB):  HYSTERECTOMY,TOTAL,LAPAROSCOPIC,WITH SALPINGECTOMY (N/A)  CYSTOSCOPY (N/A)    Patient location: PACU    Anesthesia Type: general    Transport from OR: Transported from OR on 6-10 L/min O2 by face mask with adequate spontaneous ventilation    Post pain: adequate analgesia    Post assessment: no apparent anesthetic complications and tolerated procedure well    Post vital signs: stable    Level of consciousness: awake and alert    Nausea/Vomiting: no nausea/vomiting    Complications: none    Transfer of care protocol was followed      Last vitals: Visit Vitals  /70   Pulse 82   Temp 37.5 °C (99.5 °F) (Skin)   Resp 18   Ht 5' 4" (1.626 m)   Wt 64.4 kg (142 lb)   SpO2 100%   Breastfeeding No   BMI 24.37 kg/m²     "

## 2024-08-22 NOTE — PROGRESS NOTES
Updated patient's brother on surgery completion without complications. No questions at this time.

## 2024-08-22 NOTE — OP NOTE
Operative Note    Patient: Kenya Charles   MRN: [id number]  Surgery Date:  8/22/24    Surgeons and Role:     * Giselle Chaudhary MD - Primary     * Keren Alicea MD - Assisting    Other OR Staff/Assistants:  Circulator: Edel Gamez RN  Scrub Person: Martin Abbott ST  First Assistant: Kecia Madrid ST  Surgical Tech Apprentice: Julius Powell, Patient Care Assistant      1st Assistants Tasks:  Opening and closing, holding camera, retraction, dissecting tissue, and removing or altering tissue     Procedure  Primary Surgeon   HYSTERECTOMY,TOTAL,LAPAROSCOPIC,WITH SALPINGECTOMY, CYSTOSCOPY  Giselle Chaudhary MD   * Panel 2 does not exist *  * Panel 2 does not exist *   * Panel 3 does not exist *  * Panel 3 does not exist *    Total laparoscopic Hysterectomy  2. Cystoscopy      Pre-operative Diagnosis:  Pelvic pain [R10.2]    Post-operative Diagnosis: same as preop diagnosis    Anesthesia Type: General     Findings:   1. Laparoscopic entry showed no evidence of blunt or sharp trauma  2. Uterus was 6 week size  3. Right Adnexa & Ovarian Fossa WNL, fallopian tube absent   4. Left Adnexa & Ovarian Fossa WNL, fallopian tube absent  5. Anterior Cul de Sac: adhesions between ANDRAE and pelvic peritoneum   6. Posterior Cul de Sac WNL  7. Appendix WNL    EBL: 25cc  IVF: per anesthesia   UOP: 200ml    Complications: none    Specimens: uterus and cervix   Implants: none       Dispo: PACU      Procedure in Detail:    Abdominal Entry  The patient was taken to the operating room where general anesthesia was administered and found to be adequate.  She was placed in the Kevin stirrups, prepped and draped in the usual sterile fashion.  A uterus was sounded and a raymond uterine manipulator was placed in a normal fashion.    A 10 mm umbilical skin incision was made with the scalpel. A Veress needle was inserted into the umbilicus under tenting of the anterior abdominal wall.  Placement into the peritoneal  cavity was confirmed via saline drop test.  The abdomen was insufflated to 15mm Hg using Carbon dioxide.  A 10 mm trocar was advanced through this incision.  Excellent hemostasis was noted.  The patient was placed in deep Trendelenburg.  An 5 mm left lower quadrant skin incision was made with a scalpel and a 5 mm trocar was advanced through this incision under visualization of the camera.  A 5 mm right lower quadrant skin incision was made with the scalpel.  A 5 mm trocar was advanced through this incision under visualization of the camera.  Excellent hemostasis was noted.      The abdomen and pelvis were then examined laparoscopically and the above findings were noted.      Left Adnexa  The left utero-ovarian ligament and vessels were transected. The left round ligament was then transected and the anterior leaf of the broad ligament was the incised to the level of the bladder. The posterior leaf of the broad ligament was incised toward the medial aspect of the uterosacral ligament exposing the uterine vessels.        Right Adnexa  The right utero-ovarian ligament and vessels were transected. The right round ligament was then transected and the anterior leaf of the broad ligament was the incised to the level of the bladder. The posterior leaf of the broad ligament was incised toward the medial aspect of the uterosacral ligament exposing the uterine vessels.          Bladder Flap and Uterine Vessels, & Colpotomy  The bladder was elevated and the vesicouterine space was entered and the bladder flap was then further developed. The uterine vessels were then coagulated and transected, and lateralized to the level of the colpotomy cup bilaterally. The colpotomy incision was then made anteriorly made from 2 o'clock to 10 o'clock. The colpotomy incision was continued laterally on the left then the right side, finishing posteriorly. The uterus was then removed through the vagina.      Vaginal Cuff Closure  The vaginal cuff  was then closed with two mattress sutures in both angles of 0 vicryl using the endostitch device. The midline cuff was closed with two additional mattress sutures of 0 vicryl using the endostitch device. Areas of bleeding on the posterior cuff were coagulated with the ligasure device. Hemostasis was observed. Kay powder was placed over the cuff.     The bladder was then backfilled with approximately 250ml of sterile saline, guillermo catheter was removed and a cystoscopy was performed. Good ureteral jets were observed bilaterally and no bladder abnormality noted. Vaginal cuff palpated and no defects noted. The trocars were then removed under direct vision and the 10mm ports site closed with 0-vicryl at the fascia. The skin incisions were then re-approximated with 4-0 monocryl. Sponge, lap, needle and instrument counts were counts were correct x 2.  The patient tolerated the procedure well and was taken to the recovery room awake and in stable condition.        Stephanie Heaney, MD OBGYN [facility]

## 2024-08-22 NOTE — INTERVAL H&P NOTE
The patient has been examined and the H&P has been reviewed:    I concur with the findings and no changes have occurred since H&P was written.    Surgery risks, benefits and alternative options discussed and understood by patient/family.          Active Hospital Problems    Diagnosis  POA    *Pelvic pain [R10.2]  Yes      Resolved Hospital Problems   No resolved problems to display.

## 2024-08-22 NOTE — ANESTHESIA PREPROCEDURE EVALUATION
08/22/2024  Kenya Charles is a 43 y.o., female.      Pre-op Assessment     I have reviewed the Nursing Notes.    I have reviewed the Medications.     Review of Systems  Anesthesia Hx:  No problems with previous Anesthesia             Denies Family Hx of Anesthesia complications.     Social:  Non-Smoker, No Alcohol Use       Hematology/Oncology:  Hematology Normal   Oncology Normal                                   EENT/Dental:  EENT/Dental Normal           Cardiovascular:  Cardiovascular Normal Exercise tolerance: good                                           Pulmonary:  Pulmonary Normal                       Renal/:  Renal/ Normal                 Hepatic/GI:  Hepatic/GI Normal                 Musculoskeletal:  Musculoskeletal Normal                Neurological:  Neurology Normal                                      Endocrine:  Endocrine Normal                   Anesthesia Plan  Type of Anesthesia, risks & benefits discussed:    Anesthesia Type: Gen ETT  Intra-op Monitoring Plan: Standard ASA Monitors  Post Op Pain Control Plan: multimodal analgesia  Induction:  IV  Airway Plan: Direct, Post-Induction  Informed Consent: Informed consent signed with the Patient and all parties understand the risks and agree with anesthesia plan.  All questions answered.   ASA Score: 2    Ready For Surgery From Anesthesia Perspective.     .

## 2024-08-22 NOTE — ANESTHESIA PROCEDURE NOTES
Intubation    Date/Time: 8/22/2024 7:09 AM    Performed by: Baljinder Walter III CRNA  Authorized by: Devin Bailey MD    Intubation:     Induction:  Intravenous    Intubated:  Postinduction    Mask Ventilation:  Easy mask    Attempts:  1    Attempted By:  Student    Method of Intubation:  Video laryngoscopy    Blade:  Stovall 3    Laryngeal View Grade: Grade I - full view of cords      Difficult Airway Encountered?: No      Complications:  None    Airway Device:  Oral endotracheal tube    Airway Device Size:  7.0    Style/Cuff Inflation:  Cuffed (inflated to minimal occlusive pressure)    Tube secured:  21    Secured at:  The lips    Placement Verified By:  Capnometry    Complicating Factors:  None    Findings Post-Intubation:  BS equal bilateral

## 2024-08-22 NOTE — DISCHARGE SUMMARY
Obstetrics and Gynecology  Discharge Summary                                                             Admission Date: 8/22/2024  Discharge Date: 8/22/2024                                                  Attending: Giselle Chaudhary MD                                                                                     Admission Diagnosis: Pelvic pain [R10.2]     Discharge  diagnosis:   Active Problem List with Overview Notes    Diagnosis Date Noted    Pelvic pain 08/22/2024    Menorrhagia 03/10/2022    HLD (hyperlipidemia) 08/31/2019    Burning with urination 05/31/2018    Bladder spasm 05/31/2018    Urinary incontinence, mixed 05/31/2018    Chronic constipation 05/31/2018    Nocturia more than twice per night 05/31/2018    Vaginal discharge 05/31/2018    Status post bilateral salpingectomy 11/04/2016        Operative Procedure: Total laparoscopic Hysterectomy, cystoscopy     Complications: none    Pathology: pending      Hospital Course: Patient admitted to outpatient surgery for above procedure, completed without complication. Patient discharged home in stable condition after recovery.      D/C Labs:    Lab Results   Component Value Date    WBC 6.02 08/22/2024    HGB 13.8 08/22/2024    HCT 41.1 08/22/2024    MCV 93 08/22/2024     08/22/2024        BMP  Lab Results   Component Value Date     08/22/2024    K 3.8 08/22/2024     08/22/2024    CO2 24 08/22/2024    BUN 15 08/22/2024    CREATININE 0.8 08/22/2024    CALCIUM 9.5 08/22/2024    ANIONGAP 9 08/22/2024    ESTGFRAFRICA >60 11/06/2019    EGFRNONAA >60 11/06/2019        Condition at discharge: Stable    Medications:   Discharge Medication List as of 8/22/2024 10:31 AM        START taking these medications    Details   traMADoL (ULTRAM) 50 mg tablet Take 1 tablet (50 mg total) by mouth every 4 (four) hours as needed for Pain., Starting Thu 8/22/2024, Normal           CONTINUE these medications which have NOT CHANGED    Details   dicyclomine  (BENTYL) 10 MG capsule TAKE 1 CAPSULE BY MOUTH TWICE A DAY, Starting Wed 5/15/2024, Normal      docusate sodium (COLACE) 100 MG capsule Take 1 capsule (100 mg total) by mouth daily as needed for Constipation., Starting Thu 8/15/2024, Until Fri 8/15/2025 at 2359, Normal      estradioL (ESTRACE) 0.01 % (0.1 mg/gram) vaginal cream Place 1 g vaginally twice a week., Starting Thu 6/20/2024, Normal      !! gabapentin (NEURONTIN) 100 MG capsule Take 1 capsule (100 mg total) by mouth 3 (three) times daily., Starting Wed 6/19/2024, Until Thu 6/19/2025, Normal      !! gabapentin (NEURONTIN) 100 MG capsule Take 1 capsule (100 mg total) by mouth 3 (three) times daily. for 14 days, Starting Thu 8/15/2024, Until Thu 8/29/2024, Normal      hydrocortisone 1 % cream Apply 1 g topically every evening. place intravaginally with vaginal applicator at night for 4-6 weeks, Starting Thu 6/6/2024, Normal      ibuprofen (ADVIL,MOTRIN) 800 MG tablet Take 800 mg by mouth every 8 (eight) hours as needed., Starting Mon 5/1/2023, Historical Med      meloxicam (MOBIC) 15 MG tablet Take 15 mg by mouth., Starting Fri 11/10/2023, Historical Med      metroNIDAZOLE (METROGEL) 0.75 % (37.5mg/5 gram) vaginal gel APPLY 1 APPLICATOR PER VAGINA AT NIGHTTIME FOR 5-7 DAYS, Normal      ondansetron (ZOFRAN-ODT) 4 MG TbDL Take 2 tablets (8 mg total) by mouth every 8 (eight) hours as needed (nausea)., Starting Thu 8/15/2024, Normal      scopolamine (TRANSDERM-SCOP) 1.3-1.5 mg (1 mg over 3 days) Place 1 patch onto the skin every 72 hours., Starting Thu 8/15/2024, Normal       !! - Potential duplicate medications found. Please discuss with provider.        STOP taking these medications       clindamycin (CLEOCIN) 300 MG capsule Comments:   Reason for Stopping:         HYDROcodone-acetaminophen (NORCO) 5-325 mg per tablet Comments:   Reason for Stopping:         polyethylene glycol (GLYCOLAX) 17 gram/dose powder Comments:   Reason for Stopping:                Discharge Activity: No driving for 2 weeks, no heavy lifting, pushing, pulling, or strenuous activity for 6 weeks, pelvic rest for 6 weeks.    Diet: Regular      Discharge instructions: Notify MD or return to ED for evaluation if persistent fever >100.4, increasing abdominal pain, heavy vaginal bleeding, dizziness, shortness of breath/difficulty breathing      .  Disposition: home    Follow-up: 2 week with Dr. Chaudhary in clinic         Giselle Chaudhary MD

## 2024-08-25 NOTE — ANESTHESIA POSTPROCEDURE EVALUATION
Anesthesia Post Evaluation    Patient: Kenya Charles    Procedure(s) Performed: Procedure(s) (LRB):  HYSTERECTOMY,TOTAL,LAPAROSCOPIC,WITH SALPINGECTOMY (N/A)  CYSTOSCOPY (N/A)    Final Anesthesia Type: general      Patient location during evaluation: PACU  Patient participation: Yes- Able to Participate  Level of consciousness: awake and alert  Post-procedure vital signs: reviewed and stable  Pain management: adequate  Airway patency: patent    PONV status at discharge: No PONV  Anesthetic complications: no      Cardiovascular status: blood pressure returned to baseline and hemodynamically stable  Respiratory status: unassisted  Hydration status: euvolemic  Follow-up not needed.              Vitals Value Taken Time   /61 08/22/24 1230   Temp 36.8 °C (98.2 °F) 08/22/24 1230   Pulse 73 08/22/24 1230   Resp 18 08/22/24 1230   SpO2 94 % 08/22/24 1230         Event Time   Out of Recovery 09:43:14         Pain/Janelle Score: No data recorded

## 2024-08-26 ENCOUNTER — TELEPHONE (OUTPATIENT)
Dept: OBSTETRICS AND GYNECOLOGY | Facility: CLINIC | Age: 44
End: 2024-08-26
Payer: MEDICAID

## 2024-08-26 LAB
FINAL PATHOLOGIC DIAGNOSIS: NORMAL
GROSS: NORMAL
Lab: NORMAL

## 2024-08-26 NOTE — TELEPHONE ENCOUNTER
Called patient to follow up after surgery. She is doing well, no complaints or concerns. Had pain with BM today but feeling better already. No bleeding. Discussed incision care and pathology report, all questions answered.

## 2024-09-05 ENCOUNTER — OFFICE VISIT (OUTPATIENT)
Dept: OBSTETRICS AND GYNECOLOGY | Facility: CLINIC | Age: 44
End: 2024-09-05
Payer: MEDICAID

## 2024-09-05 VITALS
SYSTOLIC BLOOD PRESSURE: 103 MMHG | DIASTOLIC BLOOD PRESSURE: 73 MMHG | BODY MASS INDEX: 24.56 KG/M2 | WEIGHT: 143.13 LBS

## 2024-09-05 DIAGNOSIS — Z90.710 S/P LAPAROSCOPIC HYSTERECTOMY: Primary | ICD-10-CM

## 2024-09-05 PROCEDURE — 99024 POSTOP FOLLOW-UP VISIT: CPT | Mod: ,,, | Performed by: STUDENT IN AN ORGANIZED HEALTH CARE EDUCATION/TRAINING PROGRAM

## 2024-09-05 PROCEDURE — 3078F DIAST BP <80 MM HG: CPT | Mod: CPTII,,, | Performed by: STUDENT IN AN ORGANIZED HEALTH CARE EDUCATION/TRAINING PROGRAM

## 2024-09-05 PROCEDURE — 99213 OFFICE O/P EST LOW 20 MIN: CPT | Mod: PBBFAC,PO | Performed by: STUDENT IN AN ORGANIZED HEALTH CARE EDUCATION/TRAINING PROGRAM

## 2024-09-05 PROCEDURE — 3074F SYST BP LT 130 MM HG: CPT | Mod: CPTII,,, | Performed by: STUDENT IN AN ORGANIZED HEALTH CARE EDUCATION/TRAINING PROGRAM

## 2024-09-05 PROCEDURE — 1159F MED LIST DOCD IN RCRD: CPT | Mod: CPTII,,, | Performed by: STUDENT IN AN ORGANIZED HEALTH CARE EDUCATION/TRAINING PROGRAM

## 2024-09-05 PROCEDURE — 99999 PR PBB SHADOW E&M-EST. PATIENT-LVL III: CPT | Mod: PBBFAC,,, | Performed by: STUDENT IN AN ORGANIZED HEALTH CARE EDUCATION/TRAINING PROGRAM

## 2024-09-05 RX ORDER — MUPIROCIN 20 MG/G
OINTMENT TOPICAL 3 TIMES DAILY
Qty: 30 G | Refills: 0 | Status: SHIPPED | OUTPATIENT
Start: 2024-09-05

## 2024-09-06 NOTE — PROGRESS NOTES
CC: Postoperative visit    Kenya Charles is a 43 y.o. female  presents for a postoperative visit s/p TLH, cysto on 24.  Her postoperative course has been uncomplicated.  She is doing well postoperatively.    Pathology showed:  Uterus, cervix and bilateral fallopian tubes weighing 50.5 g showing:   Scarred inactive endometrium compatible with prior ablation   Negative cervix   A single intramural leiomyomas identified measuring up to 1 cm   Portions of left and right fallopian tubes are identified.     /73   Wt 64.9 kg (143 lb 1.6 oz)   LMP  (LMP Unknown)   BMI 24.56 kg/m²     ROS:  GENERAL: No fever, chills, fatigability or weight loss.  VULVAR: No pain, no lesions and no itching.  VAGINAL: No relaxation, no itching, no discharge, no abnormal bleeding and no lesions.  ABDOMEN: No abdominal pain. Denies nausea. Denies vomiting. No diarrhea. No constipation  BREAST: Denies pain. No lumps. No discharge.  URINARY: No incontinence, no nocturia, no frequency and no dysuria.  CARDIOVASCULAR: No chest pain. No shortness of breath. No leg cramps.  NEUROLOGICAL: No headaches. No vision changes.    Physical Exam  APPEARANCE: Well nourished, well developed, in no acute distress.  AFFECT: WNL, alert and oriented x 3  SKIN: No acne or hirsutism  ABDOMEN: Soft.  No tenderness or masses.  No hepatosplenomegaly.  No hernias.  INCISIONS: Clean, dry, intact. LLQ Well healed. Umbilical and RLQ mild erythema, no drainage.   PELVIC: deferred   EXTREMITIES: No edema.      1. S/P laparoscopic hysterectomy            Reviewed op note/procedure, pathology report with patient. All questions answered  Incisions healing well, umbilical and RLQ mild erythema no concern for infection. Discussed topical mupirocin to help with local irritation/inflammation while healing.  Follow up 6 week post op exam for vaginal cuff check       Giselle Chaudhary MD  OBGYN [facility]

## 2024-09-08 ENCOUNTER — PATIENT MESSAGE (OUTPATIENT)
Dept: OBSTETRICS AND GYNECOLOGY | Facility: CLINIC | Age: 44
End: 2024-09-08
Payer: MEDICAID

## 2024-09-09 RX ORDER — CEPHALEXIN 500 MG/1
500 CAPSULE ORAL 4 TIMES DAILY
Qty: 40 CAPSULE | Refills: 0 | Status: SHIPPED | OUTPATIENT
Start: 2024-09-09 | End: 2024-09-19

## 2024-10-09 ENCOUNTER — OFFICE VISIT (OUTPATIENT)
Dept: OBSTETRICS AND GYNECOLOGY | Facility: CLINIC | Age: 44
End: 2024-10-09
Payer: MEDICAID

## 2024-10-09 ENCOUNTER — LAB VISIT (OUTPATIENT)
Dept: LAB | Facility: HOSPITAL | Age: 44
End: 2024-10-09
Attending: STUDENT IN AN ORGANIZED HEALTH CARE EDUCATION/TRAINING PROGRAM
Payer: MEDICAID

## 2024-10-09 VITALS
WEIGHT: 144.38 LBS | DIASTOLIC BLOOD PRESSURE: 74 MMHG | BODY MASS INDEX: 24.79 KG/M2 | SYSTOLIC BLOOD PRESSURE: 114 MMHG

## 2024-10-09 DIAGNOSIS — Z90.710 S/P LAPAROSCOPIC HYSTERECTOMY: Primary | ICD-10-CM

## 2024-10-09 DIAGNOSIS — Z11.3 ROUTINE SCREENING FOR STI (SEXUALLY TRANSMITTED INFECTION): ICD-10-CM

## 2024-10-09 LAB
HBV SURFACE AG SERPL QL IA: NORMAL
HCV AB SERPL QL IA: NORMAL
HIV 1+2 AB+HIV1 P24 AG SERPL QL IA: NORMAL
TREPONEMA PALLIDUM IGG+IGM AB [PRESENCE] IN SERUM OR PLASMA BY IMMUNOASSAY: NONREACTIVE

## 2024-10-09 PROCEDURE — 87389 HIV-1 AG W/HIV-1&-2 AB AG IA: CPT | Performed by: STUDENT IN AN ORGANIZED HEALTH CARE EDUCATION/TRAINING PROGRAM

## 2024-10-09 PROCEDURE — 87624 HPV HI-RISK TYP POOLED RSLT: CPT | Performed by: STUDENT IN AN ORGANIZED HEALTH CARE EDUCATION/TRAINING PROGRAM

## 2024-10-09 PROCEDURE — 99024 POSTOP FOLLOW-UP VISIT: CPT | Mod: ,,, | Performed by: STUDENT IN AN ORGANIZED HEALTH CARE EDUCATION/TRAINING PROGRAM

## 2024-10-09 PROCEDURE — 1159F MED LIST DOCD IN RCRD: CPT | Mod: CPTII,,, | Performed by: STUDENT IN AN ORGANIZED HEALTH CARE EDUCATION/TRAINING PROGRAM

## 2024-10-09 PROCEDURE — 36415 COLL VENOUS BLD VENIPUNCTURE: CPT | Performed by: STUDENT IN AN ORGANIZED HEALTH CARE EDUCATION/TRAINING PROGRAM

## 2024-10-09 PROCEDURE — 3074F SYST BP LT 130 MM HG: CPT | Mod: CPTII,,, | Performed by: STUDENT IN AN ORGANIZED HEALTH CARE EDUCATION/TRAINING PROGRAM

## 2024-10-09 PROCEDURE — 87340 HEPATITIS B SURFACE AG IA: CPT | Performed by: STUDENT IN AN ORGANIZED HEALTH CARE EDUCATION/TRAINING PROGRAM

## 2024-10-09 PROCEDURE — 99999 PR PBB SHADOW E&M-EST. PATIENT-LVL III: CPT | Mod: PBBFAC,,, | Performed by: STUDENT IN AN ORGANIZED HEALTH CARE EDUCATION/TRAINING PROGRAM

## 2024-10-09 PROCEDURE — 86803 HEPATITIS C AB TEST: CPT | Performed by: STUDENT IN AN ORGANIZED HEALTH CARE EDUCATION/TRAINING PROGRAM

## 2024-10-09 PROCEDURE — 99213 OFFICE O/P EST LOW 20 MIN: CPT | Mod: PBBFAC,PO | Performed by: STUDENT IN AN ORGANIZED HEALTH CARE EDUCATION/TRAINING PROGRAM

## 2024-10-09 PROCEDURE — 3078F DIAST BP <80 MM HG: CPT | Mod: CPTII,,, | Performed by: STUDENT IN AN ORGANIZED HEALTH CARE EDUCATION/TRAINING PROGRAM

## 2024-10-09 PROCEDURE — 86593 SYPHILIS TEST NON-TREP QUANT: CPT | Performed by: STUDENT IN AN ORGANIZED HEALTH CARE EDUCATION/TRAINING PROGRAM

## 2024-10-09 NOTE — PROGRESS NOTES
CC: Postoperative visit    Kenya Charles is a 44 y.o. female  presents for a postoperative visit s/p TLH, cysto on 24.  Her postoperative course has been uncomplicated.  She is doing well postoperatively. She is feeling really good, in new relationship and would like to have STI screening today.     Pathology showed:  Uterus, cervix and bilateral fallopian tubes weighing 50.5 g showing:   Scarred inactive endometrium compatible with prior ablation   Negative cervix   A single intramural leiomyomas identified measuring up to 1 cm   Portions of left and right fallopian tubes are identified.     /74 (Patient Position: Sitting)   Wt 65.5 kg (144 lb 6.4 oz)   BMI 24.79 kg/m²     ROS:  GENERAL: No fever, chills, fatigability or weight loss.  VULVAR: No pain, no lesions and no itching.  VAGINAL: No relaxation, no itching, no discharge, no abnormal bleeding and no lesions.  ABDOMEN: No abdominal pain. Denies nausea. Denies vomiting. No diarrhea. No constipation  BREAST: Denies pain. No lumps. No discharge.  URINARY: No incontinence, no nocturia, no frequency and no dysuria.  CARDIOVASCULAR: No chest pain. No shortness of breath. No leg cramps.  NEUROLOGICAL: No headaches. No vision changes.    Physical Exam  APPEARANCE: Well nourished, well developed, in no acute distress.  AFFECT: WNL, alert and oriented x 3  SKIN: No acne or hirsutism  ABDOMEN: Soft.  No tenderness or masses.  No hepatosplenomegaly.  No hernias.  INCISIONS:  completely healed  PELVIC: vaginal cuff healed no lesions, palpated with no defects  EXTREMITIES: No edema.      1. S/P laparoscopic hysterectomy        2. Routine screening for STI (sexually transmitted infection)  Hepatitis B Surface Antigen    Hepatitis C Antibody    HIV 1/2 Ag/Ab (4th Gen)    Treponema Pallidium Antibodies IgG, IgM    HPV High Risk Genotypes, PCR        Incisions healed  Vaginal cuff well healed  STI testing completed today per patient request   May return  to normal exercise/sex  Follow up as needed or 1 year WWE Stephanie Heaney, MD OBGYN [facility]

## 2024-10-18 DIAGNOSIS — G89.18 POST-OP PAIN: ICD-10-CM

## 2024-10-18 RX ORDER — DOCUSATE SODIUM 100 MG/1
CAPSULE, LIQUID FILLED ORAL
Qty: 30 CAPSULE | Refills: 1 | Status: SHIPPED | OUTPATIENT
Start: 2024-10-18

## 2024-10-18 NOTE — TELEPHONE ENCOUNTER
Refill Routing Note   Medication(s) are not appropriate for processing by [facility] for the following reason(s):        Outside of protocol  Responsible provider unclear    [facility] action(s):  Route             Appointments  past 12m or future 3m with PCP    Date Provider   Last Visit   10/9/2024 Giselle Chaudhary MD   Next Visit   Visit date not found Giselle Chaudhary MD   ED visits in past 90 days: 0        Note composed:10:43 AM 10/18/2024

## 2025-03-07 ENCOUNTER — OFFICE VISIT (OUTPATIENT)
Facility: CLINIC | Age: 45
End: 2025-03-07
Payer: MEDICAID

## 2025-03-07 VITALS
SYSTOLIC BLOOD PRESSURE: 120 MMHG | WEIGHT: 154.88 LBS | DIASTOLIC BLOOD PRESSURE: 68 MMHG | BODY MASS INDEX: 26.44 KG/M2 | HEIGHT: 64 IN

## 2025-03-07 DIAGNOSIS — R10.2 PELVIC PAIN: Primary | ICD-10-CM

## 2025-03-07 DIAGNOSIS — N76.0 ACUTE VAGINITIS: ICD-10-CM

## 2025-03-07 DIAGNOSIS — N89.8 VAGINAL DISCHARGE: ICD-10-CM

## 2025-03-07 PROCEDURE — 3008F BODY MASS INDEX DOCD: CPT | Mod: CPTII,,, | Performed by: STUDENT IN AN ORGANIZED HEALTH CARE EDUCATION/TRAINING PROGRAM

## 2025-03-07 PROCEDURE — 99214 OFFICE O/P EST MOD 30 MIN: CPT | Mod: S$PBB,,, | Performed by: STUDENT IN AN ORGANIZED HEALTH CARE EDUCATION/TRAINING PROGRAM

## 2025-03-07 PROCEDURE — 3078F DIAST BP <80 MM HG: CPT | Mod: CPTII,,, | Performed by: STUDENT IN AN ORGANIZED HEALTH CARE EDUCATION/TRAINING PROGRAM

## 2025-03-07 PROCEDURE — 1159F MED LIST DOCD IN RCRD: CPT | Mod: CPTII,,, | Performed by: STUDENT IN AN ORGANIZED HEALTH CARE EDUCATION/TRAINING PROGRAM

## 2025-03-07 PROCEDURE — 99213 OFFICE O/P EST LOW 20 MIN: CPT | Mod: PBBFAC,PN | Performed by: STUDENT IN AN ORGANIZED HEALTH CARE EDUCATION/TRAINING PROGRAM

## 2025-03-07 PROCEDURE — 3074F SYST BP LT 130 MM HG: CPT | Mod: CPTII,,, | Performed by: STUDENT IN AN ORGANIZED HEALTH CARE EDUCATION/TRAINING PROGRAM

## 2025-03-07 PROCEDURE — 99999 PR PBB SHADOW E&M-EST. PATIENT-LVL III: CPT | Mod: PBBFAC,,, | Performed by: STUDENT IN AN ORGANIZED HEALTH CARE EDUCATION/TRAINING PROGRAM

## 2025-03-07 RX ORDER — METRONIDAZOLE 500 MG/1
500 TABLET ORAL EVERY 12 HOURS
Qty: 14 TABLET | Refills: 0 | Status: SHIPPED | OUTPATIENT
Start: 2025-03-07 | End: 2025-03-14

## 2025-03-10 NOTE — PROGRESS NOTES
CC: Follow-up    HPI:  Kenya Charles is a 44 y.o. female  presents with complaint of pelvic pain. Pain was a lot better after hysterectomy for about 5 months, started having pain again in . Pain is intermittent, worse with working out (cannot run, only can walk now) right at umbilical incision area. No mass in that spot. Had sex 3 days ago, having sour milky discharge. Burning pain in vagina after sex, no known latex allergy. No bleeding.     ROS:  GENERAL: No fever, chills, fatigability or weight loss.  VULVAR: No pain, no lesions and no itching.  VAGINAL: No relaxation, no itching, no abnormal bleeding and no lesions.  ABDOMEN: Denies nausea. Denies vomiting. No diarrhea. No constipation  BREAST: Denies pain. No lumps. No discharge.  URINARY: No incontinence, no nocturia, no frequency and no dysuria.  CARDIOVASCULAR: No chest pain. No shortness of breath. No leg cramps.  NEUROLOGICAL: No headaches. No vision changes.      Patient History:  Past Medical History:   Diagnosis Date    PONV (postoperative nausea and vomiting)      Past Surgical History:   Procedure Laterality Date     SECTION      CYSTOSCOPY N/A 2024    Procedure: CYSTOSCOPY;  Surgeon: Giselle Chaudhary MD;  Location: [facility] OR;  Service: OB/GYN;  Laterality: N/A;    ENDOMETRIAL ABLATION N/A 3/10/2022    Procedure: ABLATION, ENDOMETRIUM;  Surgeon: Royce Ruiz MD;  Location: [facility] OR;  Service: OB/GYN;  Laterality: N/A;    HYSTERECTOMY, TOTAL, LAPAROSCOPIC, WITH SALPINGECTOMY N/A 2024    Procedure: HYSTERECTOMY,TOTAL,LAPAROSCOPIC,WITH SALPINGECTOMY;  Surgeon: Giselle Chaudhary MD;  Location: [facility] OR;  Service: OB/GYN;  Laterality: N/A;    HYSTEROSCOPY WITH DILATION AND CURETTAGE OF UTERUS N/A 3/10/2022    Procedure: HYSTEROSCOPY, WITH DILATION AND CURETTAGE OF UTERUS;  Surgeon: Royce Ruiz MD;  Location: [facility] OR;  Service: OB/GYN;  Laterality: N/A;    TUBAL LIGATION       Social History[1]  Family History  "  Problem Relation Name Age of Onset    Hypertension Mother      Cervical cancer Maternal Aunt      Ovarian cancer Maternal Aunt      Cancer Paternal Aunt      Breast cancer Maternal Grandmother       OB History    Para Term  AB Living   3 1 0 1 2 1   SAB IAB Ectopic Multiple Live Births   0    1      # Outcome Date GA Lbr Roger/2nd Weight Sex Type Anes PTL Lv   3   35w0d  2.449 kg (5 lb 6.4 oz) F CS-LTranv  Y CRISTHIAN   2 AB            1 AB                Objective:   /68   Ht 5' 4" (1.626 m)   Wt 70.2 kg (154 lb 14 oz)   LMP  (LMP Unknown)   BMI 26.58 kg/m²   No LMP recorded (lmp unknown). Patient has had a hysterectomy.      PHYSICAL EXAM:  APPEARANCE: Well nourished, well developed, in no acute distress.  AFFECT: WNL, alert and oriented x 3  SKIN: No acne or hirsutism  NECK: Neck symmetric without masses or thyromegaly  CHEST: Good respiratory effect  ABDOMEN: Soft.  No tenderness or masses.  No hepatosplenomegaly.  No hernias. No reproducible pain on exam today   PELVIC: Normal external genitalia without lesions.  Normal hair distribution.  Adequate perineal body, normal urethral meatus.  Vagina moist and well rugated without lesions or discharge.  Cervix absent, vaginal cuff well healed   EXTREMITIES: No edema.      ASSESSMENT and PLAN:    ICD-10-CM ICD-9-CM    1. Pelvic pain  R10.2 [id number] metroNIDAZOLE (FLAGYL) 500 MG tablet      prasterone, dhea, (INTRAROSA) 6.5 mg Inst      Ambulatory Referral/Consult to Physical Therapy/Occupational Therapy      2. Acute vaginitis  N76.0 616.10 metroNIDAZOLE (FLAGYL) 500 MG tablet      3. Vaginal discharge  N89.8 623.5 metroNIDAZOLE (FLAGYL) 500 MG tablet        Pelvic pain  - counseling with patient today including multiple possible etiologies of pain including infection, GI, , MSK.   - recommended pelvic floor PT for evaluation and treatment of possible pelvic floor dysfunction   - discussed trial of intrarosa suppository     2. Vaginal " discharge  - suspect BV based on exam/history will treat with flagyl  - discussed avoid latex condoms to see if symptoms resolve         Follow up: as needed if symptoms worsen       Giselle Chaudhary MD  OBGYN [facility]            [1]   Social History  Tobacco Use    Smoking status: Never    Smokeless tobacco: Never   Substance Use Topics    Alcohol use: No    Drug use: Never

## 2025-03-11 ENCOUNTER — RESULTS FOLLOW-UP (OUTPATIENT)
Dept: GASTROENTEROLOGY | Facility: CLINIC | Age: 45
End: 2025-03-11

## 2025-03-11 ENCOUNTER — OFFICE VISIT (OUTPATIENT)
Dept: GASTROENTEROLOGY | Facility: CLINIC | Age: 45
End: 2025-03-11
Payer: MEDICAID

## 2025-03-11 VITALS
SYSTOLIC BLOOD PRESSURE: 104 MMHG | HEIGHT: 64 IN | BODY MASS INDEX: 25.76 KG/M2 | WEIGHT: 150.88 LBS | HEART RATE: 75 BPM | DIASTOLIC BLOOD PRESSURE: 70 MMHG

## 2025-03-11 DIAGNOSIS — R10.84 GENERALIZED ABDOMINAL PAIN: ICD-10-CM

## 2025-03-11 DIAGNOSIS — K59.04 CHRONIC IDIOPATHIC CONSTIPATION: Primary | ICD-10-CM

## 2025-03-11 DIAGNOSIS — R10.84 GENERALIZED ABDOMINAL PAIN: Primary | ICD-10-CM

## 2025-03-11 PROCEDURE — 99214 OFFICE O/P EST MOD 30 MIN: CPT | Mod: S$PBB,,, | Performed by: NURSE PRACTITIONER

## 2025-03-11 PROCEDURE — 3078F DIAST BP <80 MM HG: CPT | Mod: CPTII,,, | Performed by: NURSE PRACTITIONER

## 2025-03-11 PROCEDURE — 99213 OFFICE O/P EST LOW 20 MIN: CPT | Mod: PBBFAC,PN | Performed by: NURSE PRACTITIONER

## 2025-03-11 PROCEDURE — 3074F SYST BP LT 130 MM HG: CPT | Mod: CPTII,,, | Performed by: NURSE PRACTITIONER

## 2025-03-11 PROCEDURE — 99999 PR PBB SHADOW E&M-EST. PATIENT-LVL III: CPT | Mod: PBBFAC,,, | Performed by: NURSE PRACTITIONER

## 2025-03-11 PROCEDURE — 3008F BODY MASS INDEX DOCD: CPT | Mod: CPTII,,, | Performed by: NURSE PRACTITIONER

## 2025-03-11 PROCEDURE — 1160F RVW MEDS BY RX/DR IN RCRD: CPT | Mod: CPTII,,, | Performed by: NURSE PRACTITIONER

## 2025-03-11 PROCEDURE — 1159F MED LIST DOCD IN RCRD: CPT | Mod: CPTII,,, | Performed by: NURSE PRACTITIONER

## 2025-03-11 RX ORDER — DICYCLOMINE HYDROCHLORIDE 20 MG/1
20 TABLET ORAL 3 TIMES DAILY PRN
Qty: 60 TABLET | Refills: 2 | Status: SHIPPED | OUTPATIENT
Start: 2025-03-11

## 2025-03-11 NOTE — PROGRESS NOTES
Subjective:       Patient ID: Kenya Charles is a 44 y.o. female.    Chief Complaint: Constipation and Abdominal Pain (Left Side)    45 y/o female with hx of chronic constipation presents to clinic for follow up with c/o abdominal pain. Last seen in GI clinic 2023. Patient lower abdominal/pelvic pain briefly improved after hysterectomy 2024 with recurrent pain over the last month. Describes pain as cramping and dull ache in the lower abdomen. Pain worse with exercise. Not related to food intake. Denies nausea, vomiting, diarrhea, blood in stool, weight loss, or fever. She is constipated. Has BM ever 2-4. Has tried Miralax and Colace which she feels caused increased abdominal cramping and pain.         Past Medical History:   Diagnosis Date    PONV (postoperative nausea and vomiting)        Past Surgical History:   Procedure Laterality Date     SECTION      CYSTOSCOPY N/A 2024    Procedure: CYSTOSCOPY;  Surgeon: Giselle Chaudhary MD;  Location: [facility] OR;  Service: OB/GYN;  Laterality: N/A;    ENDOMETRIAL ABLATION N/A 3/10/2022    Procedure: ABLATION, ENDOMETRIUM;  Surgeon: Royce Ruiz MD;  Location: [facility] OR;  Service: OB/GYN;  Laterality: N/A;    HYSTERECTOMY, TOTAL, LAPAROSCOPIC, WITH SALPINGECTOMY N/A 2024    Procedure: HYSTERECTOMY,TOTAL,LAPAROSCOPIC,WITH SALPINGECTOMY;  Surgeon: Giselle Chaudhary MD;  Location: [facility] OR;  Service: OB/GYN;  Laterality: N/A;    HYSTEROSCOPY WITH DILATION AND CURETTAGE OF UTERUS N/A 3/10/2022    Procedure: HYSTEROSCOPY, WITH DILATION AND CURETTAGE OF UTERUS;  Surgeon: Royce Ruiz MD;  Location: [facility] OR;  Service: OB/GYN;  Laterality: N/A;    TUBAL LIGATION         Family History   Problem Relation Name Age of Onset    Hypertension Mother      Cervical cancer Maternal Aunt      Ovarian cancer Maternal Aunt      Cancer Paternal Aunt      Breast cancer Maternal Grandmother         Social History     Socioeconomic History    Marital status:  "   Tobacco Use    Smoking status: Never    Smokeless tobacco: Never   Substance and Sexual Activity    Alcohol use: No    Drug use: Never    Sexual activity: Yes     Partners: Male     Birth control/protection: See Surgical Hx, Condom     Social Drivers of Health     Financial Resource Strain: Medium Risk (3/7/2025)    Overall Financial Resource Strain (CARDIA)     Difficulty of Paying Living Expenses: Somewhat hard   Food Insecurity: Patient Declined (3/7/2025)    Hunger Vital Sign     Worried About Running Out of Food in the Last Year: Patient declined     Ran Out of Food in the Last Year: Patient declined   Transportation Needs: No Transportation Needs (3/7/2025)    PRAPARE - Transportation     Lack of Transportation (Medical): No     Lack of Transportation (Non-Medical): No   Physical Activity: Unknown (3/7/2025)    Exercise Vital Sign     Days of Exercise per Week: 0 days   Stress: Stress Concern Present (3/7/2025)    [employer] - Occupational Stress Questionnaire     Feeling of Stress : To some extent   Housing Stability: Low Risk  (3/7/2025)    Housing Stability Vital Sign     Unable to Pay for Housing in the Last Year: No     Number of Times Moved in the Last Year: 0     Homeless in the Last Year: No       Review of Systems   Constitutional:  Negative for appetite change and unexpected weight change.   HENT:  Negative for trouble swallowing.    Respiratory:  Negative for shortness of breath.    Cardiovascular:  Negative for chest pain.   Gastrointestinal:  Positive for abdominal pain and constipation. Negative for blood in stool, diarrhea, nausea and vomiting.   Hematological:  Negative for adenopathy. Does not bruise/bleed easily.   Psychiatric/Behavioral:  Negative for decreased concentration.          Objective:     Vitals:    03/11/25 1132   BP: 104/70   BP Location: Left arm   Patient Position: Sitting   Pulse: 75   Weight: 68.5 kg (150 lb 14.5 oz)   Height: 5' 4" " (1.626 m)          Physical Exam  Constitutional:       General: She is not in acute distress.     Appearance: Normal appearance.   HENT:      Head: Normocephalic.   Eyes:      Conjunctiva/sclera: Conjunctivae normal.   Pulmonary:      Effort: Pulmonary effort is normal. No respiratory distress.   Abdominal:      General: Bowel sounds are normal.      Palpations: Abdomen is soft.      Tenderness: There is generalized abdominal tenderness.   Musculoskeletal:         General: Normal range of motion.      Cervical back: Normal range of motion.   Skin:     General: Skin is warm and dry.   Neurological:      Mental Status: She is alert and oriented to person, place, and time.   Psychiatric:         Mood and Affect: Mood normal.         Behavior: Behavior normal.               Assessment:         ICD-10-CM ICD-9-CM   1. Chronic idiopathic constipation  K59.04 564.00   2. Generalized abdominal pain  R10.84 789.07       Plan:       Chronic idiopathic constipation  -     X-Ray Abdomen Flat And Erect; Future; Expected date: 03/11/2025  -     linaCLOtide (LINZESS) 72 mcg Cap capsule; Take 1 capsule (72 mcg total) by mouth once daily.  Dispense: 30 capsule; Refill: 2    Generalized abdominal pain  -     X-Ray Abdomen Flat And Erect; Future; Expected date: 03/11/2025  -     dicyclomine (BENTYL) 20 mg tablet; Take 1 tablet (20 mg total) by mouth 3 (three) times daily as needed (abdominal pain).  Dispense: 60 tablet; Refill: 2      Follow up in about 6 weeks (around 4/22/2025) for medication management, If symptoms worsen or fail to improve.     Patient's Medications   New Prescriptions    DICYCLOMINE (BENTYL) 20 MG TABLET    Take 1 tablet (20 mg total) by mouth 3 (three) times daily as needed (abdominal pain).    LINACLOTIDE (LINZESS) 72 MCG CAP CAPSULE    Take 1 capsule (72 mcg total) by mouth once daily.   Previous Medications    METRONIDAZOLE (FLAGYL) 500 MG TABLET    Take 1 tablet (500 mg total) by mouth every 12 (twelve)  hours. for 7 days   Modified Medications    No medications on file   Discontinued Medications    DICYCLOMINE (BENTYL) 10 MG CAPSULE    TAKE 1 CAPSULE BY MOUTH TWICE A DAY    DOCUSATE SODIUM (COLACE) 100 MG CAPSULE    TAKE 1 CAPSULE BY MOUTH DAILY AS NEEDED FOR CONSTIPATION.    ESTRADIOL (ESTRACE) 0.01 % (0.1 MG/GRAM) VAGINAL CREAM    Place 1 g vaginally twice a week.    GABAPENTIN (NEURONTIN) 100 MG CAPSULE    Take 1 capsule (100 mg total) by mouth 3 (three) times daily.    HYDROCORTISONE 1 % CREAM    Apply 1 g topically every evening. place intravaginally with vaginal applicator at night for 4-6 weeks    IBUPROFEN (ADVIL,MOTRIN) 800 MG TABLET    Take 800 mg by mouth every 8 (eight) hours as needed.    MELOXICAM (MOBIC) 15 MG TABLET    Take 15 mg by mouth.    METRONIDAZOLE (METROGEL) 0.75 % (37.5MG/5 GRAM) VAGINAL GEL    APPLY 1 APPLICATOR PER VAGINA AT NIGHTTIME FOR 5-7 DAYS    MUPIROCIN (BACTROBAN) 2 % OINTMENT    Apply topically 3 (three) times daily.    ONDANSETRON (ZOFRAN-ODT) 4 MG TBDL    Take 2 tablets (8 mg total) by mouth every 8 (eight) hours as needed (nausea).    PRASTERONE, DHEA, (INTRAROSA) 6.5 MG INST    Place 6.5 mg vaginally every evening.    SCOPOLAMINE (TRANSDERM-SCOP) 1.3-1.5 MG (1 MG OVER 3 DAYS)    Place 1 patch onto the skin every 72 hours.    TRAMADOL (ULTRAM) 50 MG TABLET    Take 1 tablet (50 mg total) by mouth every 4 (four) hours as needed for Pain.

## 2025-03-12 ENCOUNTER — OFFICE VISIT (OUTPATIENT)
Facility: CLINIC | Age: 45
End: 2025-03-12
Payer: MEDICAID

## 2025-03-12 ENCOUNTER — LAB VISIT (OUTPATIENT)
Dept: LAB | Facility: HOSPITAL | Age: 45
End: 2025-03-12
Payer: MEDICAID

## 2025-03-12 VITALS
RESPIRATION RATE: 18 BRPM | OXYGEN SATURATION: 98 % | SYSTOLIC BLOOD PRESSURE: 106 MMHG | HEART RATE: 90 BPM | DIASTOLIC BLOOD PRESSURE: 80 MMHG | BODY MASS INDEX: 25.7 KG/M2 | TEMPERATURE: 99 F | HEIGHT: 64 IN | WEIGHT: 150.56 LBS

## 2025-03-12 DIAGNOSIS — R10.2 PELVIC PAIN: ICD-10-CM

## 2025-03-12 DIAGNOSIS — R53.83 OTHER FATIGUE: ICD-10-CM

## 2025-03-12 DIAGNOSIS — Z00.00 ANNUAL PHYSICAL EXAM: ICD-10-CM

## 2025-03-12 DIAGNOSIS — N39.46 URINARY INCONTINENCE, MIXED: ICD-10-CM

## 2025-03-12 DIAGNOSIS — R63.5 ABNORMAL WEIGHT GAIN: ICD-10-CM

## 2025-03-12 DIAGNOSIS — E04.9 ENLARGED THYROID: ICD-10-CM

## 2025-03-12 DIAGNOSIS — Z00.00 ANNUAL PHYSICAL EXAM: Primary | ICD-10-CM

## 2025-03-12 LAB
ALBUMIN SERPL BCP-MCNC: 3.9 G/DL (ref 3.5–5.2)
ALP SERPL-CCNC: 76 U/L (ref 40–150)
ALT SERPL W/O P-5'-P-CCNC: 19 U/L (ref 10–44)
ANION GAP SERPL CALC-SCNC: 3 MMOL/L (ref 8–16)
AST SERPL-CCNC: 17 U/L (ref 10–40)
BASOPHILS # BLD AUTO: 0.02 K/UL (ref 0–0.2)
BASOPHILS NFR BLD: 0.4 % (ref 0–1.9)
BILIRUB SERPL-MCNC: 0.5 MG/DL (ref 0.1–1)
BUN SERPL-MCNC: 13 MG/DL (ref 6–20)
CALCIUM SERPL-MCNC: 9 MG/DL (ref 8.7–10.5)
CHLORIDE SERPL-SCNC: 110 MMOL/L (ref 95–110)
CHOLEST SERPL-MCNC: 225 MG/DL (ref 120–199)
CHOLEST/HDLC SERPL: 4 {RATIO} (ref 2–5)
CO2 SERPL-SCNC: 25 MMOL/L (ref 23–29)
CREAT SERPL-MCNC: 0.7 MG/DL (ref 0.5–1.4)
DIFFERENTIAL METHOD BLD: ABNORMAL
EOSINOPHIL # BLD AUTO: 0.1 K/UL (ref 0–0.5)
EOSINOPHIL NFR BLD: 1.1 % (ref 0–8)
ERYTHROCYTE [DISTWIDTH] IN BLOOD BY AUTOMATED COUNT: 12.3 % (ref 11.5–14.5)
EST. GFR  (NO RACE VARIABLE): >60 ML/MIN/1.73 M^2
ESTIMATED AVG GLUCOSE: 97 MG/DL (ref 68–131)
GLUCOSE SERPL-MCNC: 92 MG/DL (ref 70–110)
HBA1C MFR BLD: 5 % (ref 4–5.6)
HCT VFR BLD AUTO: 41.6 % (ref 37–48.5)
HDLC SERPL-MCNC: 56 MG/DL (ref 40–75)
HDLC SERPL: 24.9 % (ref 20–50)
HGB BLD-MCNC: 13.5 G/DL (ref 12–16)
IMM GRANULOCYTES # BLD AUTO: 0.03 K/UL (ref 0–0.04)
IMM GRANULOCYTES NFR BLD AUTO: 0.6 % (ref 0–0.5)
LDLC SERPL CALC-MCNC: 152 MG/DL (ref 63–159)
LYMPHOCYTES # BLD AUTO: 1.3 K/UL (ref 1–4.8)
LYMPHOCYTES NFR BLD: 23.5 % (ref 18–48)
MCH RBC QN AUTO: 30.5 PG (ref 27–31)
MCHC RBC AUTO-ENTMCNC: 32.5 G/DL (ref 32–36)
MCV RBC AUTO: 94 FL (ref 82–98)
MONOCYTES # BLD AUTO: 0.4 K/UL (ref 0.3–1)
MONOCYTES NFR BLD: 7.5 % (ref 4–15)
NEUTROPHILS # BLD AUTO: 3.6 K/UL (ref 1.8–7.7)
NEUTROPHILS NFR BLD: 66.9 % (ref 38–73)
NONHDLC SERPL-MCNC: 169 MG/DL
NRBC BLD-RTO: 0 /100 WBC
PLATELET # BLD AUTO: 307 K/UL (ref 150–450)
PMV BLD AUTO: 9.7 FL (ref 9.2–12.9)
POTASSIUM SERPL-SCNC: 4.5 MMOL/L (ref 3.5–5.1)
PROT SERPL-MCNC: 7.4 G/DL (ref 6–8.4)
RBC # BLD AUTO: 4.43 M/UL (ref 4–5.4)
SODIUM SERPL-SCNC: 138 MMOL/L (ref 136–145)
T4 SERPL-MCNC: 8.9 UG/DL (ref 4.5–11.5)
TRIGL SERPL-MCNC: 85 MG/DL (ref 30–150)
TSH SERPL DL<=0.005 MIU/L-ACNC: 0.77 UIU/ML (ref 0.4–4)
WBC # BLD AUTO: 5.44 K/UL (ref 3.9–12.7)

## 2025-03-12 PROCEDURE — 99215 OFFICE O/P EST HI 40 MIN: CPT | Mod: PBBFAC,PN

## 2025-03-12 PROCEDURE — 36415 COLL VENOUS BLD VENIPUNCTURE: CPT

## 2025-03-12 PROCEDURE — 80053 COMPREHEN METABOLIC PANEL: CPT

## 2025-03-12 PROCEDURE — 84443 ASSAY THYROID STIM HORMONE: CPT

## 2025-03-12 PROCEDURE — 84436 ASSAY OF TOTAL THYROXINE: CPT

## 2025-03-12 PROCEDURE — 83036 HEMOGLOBIN GLYCOSYLATED A1C: CPT

## 2025-03-12 PROCEDURE — 85025 COMPLETE CBC W/AUTO DIFF WBC: CPT

## 2025-03-12 PROCEDURE — 80061 LIPID PANEL: CPT

## 2025-03-12 PROCEDURE — 99999 PR PBB SHADOW E&M-EST. PATIENT-LVL V: CPT | Mod: PBBFAC,,,
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
## 2025-03-17 ENCOUNTER — HOSPITAL ENCOUNTER (OUTPATIENT)
Dept: RADIOLOGY | Facility: HOSPITAL | Age: 45
Discharge: HOME | End: 2025-03-17
Attending: NURSE PRACTITIONER
Payer: MEDICAID

## 2025-03-17 DIAGNOSIS — R10.84 GENERALIZED ABDOMINAL PAIN: ICD-10-CM

## 2025-03-17 PROCEDURE — 25500020 PHARM REV CODE 255: Performed by: NURSE PRACTITIONER

## 2025-03-17 PROCEDURE — 74177 CT ABD & PELVIS W/CONTRAST: CPT | Mod: TC

## 2025-03-17 PROCEDURE — 74177 CT ABD & PELVIS W/CONTRAST: CPT | Mod: 26,,, | Performed by: RADIOLOGY

## 2025-03-17 RX ADMIN — IOHEXOL 75 ML: 350 INJECTION, SOLUTION INTRAVENOUS at 09:03

## 2025-03-17 RX ADMIN — IOHEXOL 30 ML: 350 INJECTION, SOLUTION INTRAVENOUS at 08:03

## 2025-03-20 ENCOUNTER — HOSPITAL ENCOUNTER (OUTPATIENT)
Dept: RADIOLOGY | Facility: HOSPITAL | Age: 45
Discharge: HOME | End: 2025-03-20
Payer: MEDICAID

## 2025-03-20 DIAGNOSIS — E04.9 ENLARGED THYROID: ICD-10-CM

## 2025-03-20 PROCEDURE — 76536 US EXAM OF HEAD AND NECK: CPT | Mod: 26,,, | Performed by: RADIOLOGY

## 2025-03-20 PROCEDURE — 76536 US EXAM OF HEAD AND NECK: CPT | Mod: TC

## 2025-03-21 ENCOUNTER — RESULTS FOLLOW-UP (OUTPATIENT)
Facility: CLINIC | Age: 45
End: 2025-03-21
Payer: MEDICAID

## 2025-03-21 DIAGNOSIS — R11.0 NAUSEA: Primary | ICD-10-CM

## 2025-03-24 ENCOUNTER — RESULTS FOLLOW-UP (OUTPATIENT)
Dept: GASTROENTEROLOGY | Facility: CLINIC | Age: 45
End: 2025-03-24

## 2025-03-24 DIAGNOSIS — R10.84 GENERALIZED ABDOMINAL PAIN: Primary | ICD-10-CM

## 2025-03-24 DIAGNOSIS — K92.1 HEMATOCHEZIA: ICD-10-CM

## 2025-03-24 RX ORDER — POLYETHYLENE GLYCOL 3350, SODIUM SULFATE ANHYDROUS, SODIUM BICARBONATE, SODIUM CHLORIDE, POTASSIUM CHLORIDE 236; 22.74; 6.74; 5.86; 2.97 G/4L; G/4L; G/4L; G/4L; G/4L
POWDER, FOR SOLUTION ORAL
Qty: 4000 ML | Refills: 0 | Status: SHIPPED | OUTPATIENT
Start: 2025-03-24 | End: 2025-03-26

## 2025-03-25 ENCOUNTER — TELEPHONE (OUTPATIENT)
Dept: GASTROENTEROLOGY | Facility: CLINIC | Age: 45
End: 2025-03-25
Payer: MEDICAID

## 2025-03-25 NOTE — TELEPHONE ENCOUNTER
Dear Mrs. Charles    Attached are your instructions for your upcoming procedure. Please take some time to carefully review them and write down any questions you may have. A nurse will call you 1-2 weeks prior to your procedure to go over the instructions and answer any questions you may have.       Colonoscopy Instructions    Date of procedure: 04/08/2025    Arrival Time: We will call you 1 day prior to your procedure to provide you with an arrival time.    Location of Department:   [facility] [address] [place] [address]   1st Floor Same Day Surgery    The day before your procedure: 04/07/2025     You can only drink CLEAR LIQUIDS the whole day before your test. You CAN NOT eat any food for the entire day.    Clear Liquids That Are OK to Drink:   Water  Sports drinks (Gatorade, Power-Aid)  Crystal Light, Lemonade, Limeaid  Snowball, popsicle  Coffee or tea (no cream or nondairy creamer)  Clear juices without pulp (apple, white grape)  Jello (NO fruit or toppings)  Clear soda (sprite, coke, ginger ale)  Chicken broth (until 12 midnight the night before procedure)  Bouillon broth      What You CANNOT do:   Do not EAT solid food or drink milk/dairy products  Do not drink alcohol.  Do not take oral medications within 1 hour of starting   each dose of bowel prep.      Notes:   Please disregard the instructions that are in included with your prep from the pharmacy. ONLY FOLLOW THE PREP INSTRUCTIONS BELOW  GOLYTELY Bowel Prep is indicated for cleansing of the colon as a preparation for colonoscopy in adults.   Be sure to tell your doctor about all the medicines you take, including prescription and non-prescription medicines, vitamins, and herbal supplements. GOLYTELY Bowel Prep may affect how other medicines work.  Medication taken by mouth may not be absorbed properly when taken within 1 hour before the start of each dose of GOLYTELY Bowel Prep.    It is not uncommon to  experience some abdominal cramping, nausea and/or vomiting when taking the prep. If you have nausea and/or vomiting while taking the prep, stop drinking for 20 to 30 minutes then continue.    How to mix the prep: At 1200, add water in container to fill line. Mix/shake well. Place prep in fridge.    DOSE 1 --- Day Before Colonoscopy 04/07/2025 @ 5:00PM    Step 1-Drink one 8 ounce glass of prep every 10 minutes until half the gallon is gone  Step 2-Place gallon back in fridge    You may continue to drink clear liquids until bedtime to avoid dehydration.         DOSE 2 -- Day of the Colonoscopy- 04/08/2025 (this dose will be 5 hours prior to your arrival time)    Step 1-Drink one 8 ounce glass of prep every 10 minutes until the remainder of the gallon is gone      You may continue drinking water/clear liquids until 2 hours prior to your arrival time or as directed by the scheduling nurse.  NO chewing gum or candy morning of procedure        Medications Instructions below:    If you take HEART, BLOOD PRESSURE, SEIZURE, PAIN, LUNG (including inhalers/nebulizers), ANTI-REJECTION (transplant patients), or PSYCHIATRIC medications, please take at your regular times with a sip of water or as directed by the scheduling nurse.          If you begin taking any blood thinning medications, injectable weight loss/diabetes medications (other than insulin), or Adipex(Phentermine) prior to your procedure please contact the endoscopy scheduling department listed below AS SOON AS POSSIBLE. If these medications are not held for the appropriate amount of days prior to procedure, your procedure will be canceled.      If you have any health changes prior to your procedure, please contact the endoscopy department to report changes.    On occasion, unforeseen circumstances may cause a delay in your procedure start time. We respect your time and appreciate your patience during these circumstances.      Important contact information:    [facility]  Endoscopy Department - [phone].  Hours of operation are Monday-Friday 8:00-4:30pm.    If you have questions regarding the prep or you need to reschedule, please call [phone].    After hours questions requiring immediate assistance, contact [facility] nurse line at [phone] or [phone].     Questions about insurance or financial obligations call [phone] or [phone].      Comments:                           IMPORTANT INFORMATION TO KNOW BEFORE YOUR PROCEDURE     [facility]      An adult friend/ family member MUST come with you to drive you home. You can not drive, take a taxi, Uber, Lyft, bus, or any form of public transportation without being accompanied by a  responsible adult. The responsible adult CAN NOT be the  of the service.     person must be available to return to pick you up within 15 minutes of being notified of discharge.    Please bring a picture ID, insurance card, & copayment    LEAVE ALL VALUABLES AND JEWELRY AT HOME. [facility] WILL NOT HOLD OR BE RESPONSIBLE FOR ANY LOST VALUABLES, JEWELRY, OR PERSONAL BELONGINGS. YOUR PERSONAL BELONGINGS MUST BE HELD BY YOUR ACCOMPANYING FRIEND/FAMILY MEMBER.    Plan to be at the hospital for 2-4 hours.           Please follow the instructions below:  1. You will see an American flag flying in the front of the hospital from [facility] as close as you can in that lot.  2. Behind the flag, you will see a covered [address], enter through those automatic double doors.  3. Immediately when you enter, you should be greeted by a team member who can assist you.  4. If not, follow the hallway immediately in front of you to the right towards the cafeteria.  5. Once you reach the cafeteria, there are only two options. You can either enter the cafeteria or continue down the hallway to your left. You will want to continue down the hallway all the way down to your  left until you absolutely cannot go anymore. You will then find yourself in our Same Day Surgery lobby where you will check in behind the glass window.      [facility] Map for Endoscopy Procedures:                   COLONOSCOPY EDUCATION VIDEO:           Colonoscopy Instructional Video                                                        OR    Type link address into your web browser's address bar:  [email or web address]

## 2025-03-26 ENCOUNTER — OFFICE VISIT (OUTPATIENT)
Dept: PRIMARY CARE CLINIC | Facility: CLINIC | Age: 45
End: 2025-03-26
Payer: MEDICAID

## 2025-03-26 VITALS
TEMPERATURE: 99 F | DIASTOLIC BLOOD PRESSURE: 78 MMHG | SYSTOLIC BLOOD PRESSURE: 110 MMHG | HEIGHT: 64 IN | OXYGEN SATURATION: 98 % | WEIGHT: 152.88 LBS | BODY MASS INDEX: 26.1 KG/M2 | HEART RATE: 69 BPM

## 2025-03-26 DIAGNOSIS — R14.0 ABDOMINAL BLOATING: ICD-10-CM

## 2025-03-26 DIAGNOSIS — E04.1 THYROID NODULE: Primary | ICD-10-CM

## 2025-03-26 DIAGNOSIS — K42.9 UMBILICAL HERNIA WITHOUT OBSTRUCTION AND WITHOUT GANGRENE: ICD-10-CM

## 2025-03-26 DIAGNOSIS — T73.3XXD FATIGUE DUE TO EXCESSIVE EXERTION, SUBSEQUENT ENCOUNTER: ICD-10-CM

## 2025-03-26 DIAGNOSIS — K59.09 CHRONIC CONSTIPATION: ICD-10-CM

## 2025-03-26 PROCEDURE — 99999 PR PBB SHADOW E&M-EST. PATIENT-LVL V: CPT | Mod: PBBFAC,,,

## 2025-03-26 PROCEDURE — 3078F DIAST BP <80 MM HG: CPT | Mod: CPTII,,,

## 2025-03-26 PROCEDURE — G2211 COMPLEX E/M VISIT ADD ON: HCPCS | Mod: S$PBB,,,

## 2025-03-26 PROCEDURE — 99215 OFFICE O/P EST HI 40 MIN: CPT | Mod: PBBFAC,PN

## 2025-03-26 PROCEDURE — 1159F MED LIST DOCD IN RCRD: CPT | Mod: CPTII,,,

## 2025-03-26 PROCEDURE — 3008F BODY MASS INDEX DOCD: CPT | Mod: CPTII,,,

## 2025-03-26 PROCEDURE — 1160F RVW MEDS BY RX/DR IN RCRD: CPT | Mod: CPTII,,,

## 2025-03-26 PROCEDURE — 99215 OFFICE O/P EST HI 40 MIN: CPT | Mod: S$PBB,,,

## 2025-03-26 PROCEDURE — 3044F HG A1C LEVEL LT 7.0%: CPT | Mod: CPTII,,,

## 2025-03-26 PROCEDURE — 3074F SYST BP LT 130 MM HG: CPT | Mod: CPTII,,,

## 2025-03-26 RX ORDER — SCOPOLAMINE 1 MG/3D
1 PATCH, EXTENDED RELEASE TRANSDERMAL
Qty: 1 PATCH | Refills: 0 | Status: SHIPPED | OUTPATIENT
Start: 2025-03-26 | End: 2025-03-27

## 2025-03-26 NOTE — PROGRESS NOTES
Subjective:       Patient ID: Kenya Charles is a 44 y.o. female.    Chief Complaint: Abdominal pain  HPI    Kenya Charles is a 44 y.o. year old female  who comes to clinic for abdominal pain and blood work and test follow up    She recently had thyroid ultrasound done that showed Large mixed cystic and solid nodule in the right lobe measuring 4.0 x 1.8 x 2.4 cm consistent with a TR 3 nodule meeting criteria for fine-needle aspiration.   She endorses pressure and discomfort in her neck. She experiences unexplained episodes of feeling strange in her body, similar to anxiety attacks without apparent cause, lasting up to three days. She is uncertain if these symptoms are related to the thyroid nodule. Thyroid function tests, including TSH and T4, are normal.    GASTROINTESTINAL:  She reports constant left-sided abdominal pain and discomfort with significant inflammation. She experiences bloating after eating, with notable abdominal distention even with small portions of food or water intake. She has an umbilical hernia that may contribute to her symptoms. She denies burning sensations but describes significant discomfort with eating. She experiences chronic constipation with bowel movements every 1-2 days, producing small, hard, pellet-like stools with sensation of incomplete evacuation. She reports difficulty tolerating MiraLax due to pain.  Patient had hysterectomy done due to fibroid. Patient states gyn told her this might not fixture pain at this was not related to her abdominal pain. Patient still wanted to go ahead and get hysterectomy done. Patient states these do not improve her symptoms and now she has umbilical hernia.    FATIGUE:  She reports significant fatigue since January that persists despite weekend rest. She experiences excessive post-prandial sleepiness 4-5 times daily, requiring sleep after eating even simple foods. She has episodes of uncontrollable yawning lasting about 5 minutes. She  "sleeps 5 hours per night. 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 She has maintained this schedule since August of last year and feels she has reached her limit.       ROS negative except as above  Objective:      /78 (BP Location: Right arm, Patient Position: Sitting)   Pulse 69   Temp 98.5 °F (36.9 °C) (Oral)   Ht 5' 4" (1.626 m)   Wt 69.3 kg (152 lb 14.2 oz)   LMP  (LMP Unknown)   SpO2 98%   BMI 26.24 kg/m²    Physical Exam  Vitals reviewed.   Constitutional:       Appearance: Normal appearance.   HENT:      Head: Normocephalic.      Mouth/Throat:      Mouth: Mucous membranes are moist.   Cardiovascular:      Rate and Rhythm: Normal rate and regular rhythm.   Pulmonary:      Effort: Pulmonary effort is normal.      Breath sounds: No wheezing or rhonchi.   Musculoskeletal:         General: No swelling. Normal range of motion.      Cervical back: Normal range of motion.   Skin:     General: Skin is warm.      Coloration: Skin is not jaundiced.   Neurological:      Mental Status: She is alert.         Assessment:       1. Thyroid nodule    2. Abdominal bloating    3. Umbilical hernia without obstruction and without gangrene    4. Fatigue due to excessive exertion, subsequent encounter    5. Chronic constipation          Plan:       1. Thyroid nodule (Primary)  Ultrasound showed Large mixed cystic and solid nodule in the right lobe measuring 4.0 x 1.8 x 2.4 cm consistent with a TR 3 nodule meeting criteria for fine-needle aspiration.   Symptoms include pressure and discomfort in her neck  She has an appointment with endocrinology in 5 months. We will send her to ENT  - Ambulatory referral/consult to ENT; Future    2. Abdominal bloating  Patient has had multiple GI evaluations, we will get H pylori as patient endorses significant bloating after eating  - H. pylori antigen, stool; Future  - H. pylori antigen, " stool    3. Umbilical hernia without obstruction and without gangrene  CT scan showed Small fat containing umbilical hernia. She wants referral to General surgery  - Ambulatory referral/consult to General Surgery; Future    4. Fatigue due to excessive exertion, subsequent encounter  Blood work including hemoglobin, CMP and thyroid labs came back normal.  This might be related to work burnout.  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 She goes to bed at 11:30 a.m. and wakes up at 5:00 a.m..  Also states she sometimes does not sleep throughout the night as she sometimes gets woken up by her dog.  - Recommend addressing underlying issues causing fatigue, including improving sleep and reducing work stress.  - Advised the patient to increase sleep duration to at least 8 hours per night.    5. Chronic constipation  This might be causing her left-sided abdominal pain.  Could not tolerate MiraLax  GI prescribed linaclotide, patient not taking it yet.  Counseled on increasing fiber intake by taking Metamucil.        -   Follow up in about 4 weeks (around 4/23/2025) for abd pain follow up .      Silverio Franco M.D.    I spent a total of 45 minutes on the day of the visit.This includes face to face time and non-face to face time preparing to see the patient (eg, review of tests), obtaining and/or reviewing separately obtained history, documenting clinical information in the electronic or other health record, independently interpreting results and communicating results to the patient/family/caregiver, or care coordinator.    This note was generated with the assistance of ambient listening technology. Verbal consent was obtained by the patient and accompanying visitor(s) for the recording of patient appointment to facilitate this note. I attest to having reviewed and edited the generated note for accuracy, though some syntax or spelling errors may persist. Please contact the  author of this note for any clarification.

## 2025-04-09 ENCOUNTER — PATIENT MESSAGE (OUTPATIENT)
Dept: GASTROENTEROLOGY | Facility: CLINIC | Age: 45
End: 2025-04-09
Payer: MEDICAID

## 2025-04-09 DIAGNOSIS — R10.84 GENERALIZED ABDOMINAL PAIN: Primary | ICD-10-CM

## 2025-04-09 NOTE — TELEPHONE ENCOUNTER
Called and discussed    Reviewed  She has left side abd pain   Improved with heat pad    Not bad enough to go to ER    Instructions  Xray tomorrow  Try bentyl    Go to er if worsening    This seems to be chronic pain of unclear etiology

## 2025-04-10 ENCOUNTER — HOSPITAL ENCOUNTER (OUTPATIENT)
Dept: RADIOLOGY | Facility: HOSPITAL | Age: 45
Discharge: HOME | End: 2025-04-10
Attending: INTERNAL MEDICINE
Payer: MEDICAID

## 2025-04-10 DIAGNOSIS — R10.84 GENERALIZED ABDOMINAL PAIN: ICD-10-CM

## 2025-04-10 PROCEDURE — 74018 RADEX ABDOMEN 1 VIEW: CPT | Mod: TC,FY

## 2025-04-10 PROCEDURE — 74018 RADEX ABDOMEN 1 VIEW: CPT | Mod: 26,,, | Performed by: RADIOLOGY

## 2025-04-13 ENCOUNTER — RESULTS FOLLOW-UP (OUTPATIENT)
Dept: GASTROENTEROLOGY | Facility: HOSPITAL | Age: 45
End: 2025-04-13

## 2025-04-14 ENCOUNTER — OFFICE VISIT (OUTPATIENT)
Dept: SURGERY | Facility: CLINIC | Age: 45
End: 2025-04-14
Payer: MEDICAID

## 2025-04-14 DIAGNOSIS — A04.8 H. PYLORI INFECTION: Primary | ICD-10-CM

## 2025-04-14 DIAGNOSIS — K42.9 UMBILICAL HERNIA WITHOUT OBSTRUCTION AND WITHOUT GANGRENE: ICD-10-CM

## 2025-04-14 RX ORDER — OMEPRAZOLE 20 MG/1
20 CAPSULE, DELAYED RELEASE ORAL 2 TIMES DAILY
Qty: 20 CAPSULE | Refills: 0 | Status: SHIPPED | OUTPATIENT
Start: 2025-04-14 | End: 2025-04-24

## 2025-04-14 RX ORDER — BISMUTH SUBCITRATE POTASSIUM, METRONIDAZOLE AND TETRACYCLINE HYDROCHLORIDE 140; 125; 125 MG/1; MG/1; MG/1
3 CAPSULE ORAL
Qty: 120 CAPSULE | Refills: 0 | Status: SHIPPED | OUTPATIENT
Start: 2025-04-14 | End: 2025-04-24

## 2025-04-14 NOTE — PROGRESS NOTES
Spoke to patient on the phone regarding lab test.  Patient tested positive for H pylori.  Discussed etiology, treatment and complications of H pylori.  We will send treatment to the pharmacy.  Discussed with patient test of cure, she will need to repeat test 1 month after finishing treatment.    1. H. pylori infection (Primary)  - omeprazole (PRILOSEC) 20 MG capsule; Take 1 capsule (20 mg total) by mouth 2 (two) times a day. for 10 days  Dispense: 20 capsule; Refill: 0  - H. pylori antigen, stool; Future  - bismuth-metronidazole-tetracycline (PYLERA) 140-125-125 mg per capsule; Take 3 capsules by mouth 4 (four) times daily before meals and nightly. for 10 days  Dispense: 120 capsule; Refill: 0

## 2025-04-16 ENCOUNTER — HOSPITAL ENCOUNTER (OUTPATIENT)
Dept: RADIOLOGY | Facility: HOSPITAL | Age: 45
Discharge: HOME | End: 2025-04-16
Attending: OBSTETRICS & GYNECOLOGY
Payer: MEDICAID

## 2025-04-16 VITALS — WEIGHT: 151 LBS | BODY MASS INDEX: 25.78 KG/M2 | HEIGHT: 64 IN

## 2025-04-16 DIAGNOSIS — Z12.31 ENCOUNTER FOR SCREENING MAMMOGRAM FOR BREAST CANCER: ICD-10-CM

## 2025-04-16 PROCEDURE — 77067 SCR MAMMO BI INCL CAD: CPT | Mod: TC

## 2025-04-22 ENCOUNTER — OFFICE VISIT (OUTPATIENT)
Dept: PRIMARY CARE CLINIC | Facility: CLINIC | Age: 45
End: 2025-04-22
Payer: MEDICAID

## 2025-04-22 VITALS
OXYGEN SATURATION: 99 % | BODY MASS INDEX: 26.34 KG/M2 | WEIGHT: 154.31 LBS | HEART RATE: 79 BPM | DIASTOLIC BLOOD PRESSURE: 79 MMHG | SYSTOLIC BLOOD PRESSURE: 102 MMHG | RESPIRATION RATE: 16 BRPM | HEIGHT: 64 IN | TEMPERATURE: 98 F

## 2025-04-22 DIAGNOSIS — Z23 IMMUNIZATION DUE: ICD-10-CM

## 2025-04-22 DIAGNOSIS — R53.83 OTHER FATIGUE: ICD-10-CM

## 2025-04-22 DIAGNOSIS — A04.8 H. PYLORI INFECTION: Primary | ICD-10-CM

## 2025-04-22 PROCEDURE — 90651 9VHPV VACCINE 2/3 DOSE IM: CPT | Mod: PBBFAC,PN

## 2025-04-22 PROCEDURE — 99999 PR PBB SHADOW E&M-EST. PATIENT-LVL III: CPT | Mod: PBBFAC,,,

## 2025-04-22 PROCEDURE — 3074F SYST BP LT 130 MM HG: CPT | Mod: CPTII,,,

## 2025-04-22 PROCEDURE — 99213 OFFICE O/P EST LOW 20 MIN: CPT | Mod: PBBFAC,PN

## 2025-04-22 PROCEDURE — 3008F BODY MASS INDEX DOCD: CPT | Mod: CPTII,,,

## 2025-04-22 PROCEDURE — 99999PBSHW PR PBB SHADOW TECHNICAL ONLY FILED TO HB: Mod: PBBFAC,,,

## 2025-04-22 PROCEDURE — 3078F DIAST BP <80 MM HG: CPT | Mod: CPTII,,,

## 2025-04-22 PROCEDURE — 3044F HG A1C LEVEL LT 7.0%: CPT | Mod: CPTII,,,

## 2025-04-22 PROCEDURE — 90472 IMMUNIZATION ADMIN EACH ADD: CPT | Mod: PBBFAC,PN

## 2025-04-22 PROCEDURE — 99214 OFFICE O/P EST MOD 30 MIN: CPT | Mod: S$PBB,,,

## 2025-04-22 PROCEDURE — G2211 COMPLEX E/M VISIT ADD ON: HCPCS | Mod: S$PBB,,,

## 2025-04-22 PROCEDURE — 90677 PCV20 VACCINE IM: CPT | Mod: PBBFAC,PN

## 2025-04-22 PROCEDURE — 90471 IMMUNIZATION ADMIN: CPT | Mod: PBBFAC,PN

## 2025-04-22 PROCEDURE — 90715 TDAP VACCINE 7 YRS/> IM: CPT | Mod: PBBFAC,PN

## 2025-04-22 PROCEDURE — 1160F RVW MEDS BY RX/DR IN RCRD: CPT | Mod: CPTII,,,

## 2025-04-22 PROCEDURE — 1159F MED LIST DOCD IN RCRD: CPT | Mod: CPTII,,,

## 2025-04-22 RX ADMIN — HUMAN PAPILLOMAVIRUS 9-VALENT VACCINE, RECOMBINANT 0.5 ML: 30; 40; 60; 40; 20; 20; 20; 20; 20 INJECTION, SUSPENSION INTRAMUSCULAR at 04:04

## 2025-04-22 RX ADMIN — CLOSTRIDIUM TETANI TOXOID ANTIGEN (FORMALDEHYDE INACTIVATED), CORYNEBACTERIUM DIPHTHERIAE TOXOID ANTIGEN (FORMALDEHYDE INACTIVATED), BORDETELLA PERTUSSIS TOXOID ANTIGEN (GLUTARALDEHYDE INACTIVATED), BORDETELLA PERTUSSIS FILAMENTOUS HEMAGGLUTININ ANTIGEN (FORMALDEHYDE INACTIVATED), BORDETELLA PERTUSSIS PERTACTIN ANTIGEN, AND BORDETELLA PERTUSSIS FIMBRIAE 2/3 ANTIGEN 0.5 ML: 5; 2; 2.5; 5; 3; 5 INJECTION, SUSPENSION INTRAMUSCULAR at 04:04

## 2025-04-22 RX ADMIN — PNEUMOCOCCAL 20-VALENT CONJUGATE VACCINE 0.5 ML
2.2; 2.2; 2.2; 2.2; 2.2; 2.2; 2.2; 2.2; 2.2; 2.2; 2.2; 2.2; 2.2; 2.2; 2.2; 2.2; 4.4; 2.2; 2.2; 2.2 INJECTION, SUSPENSION INTRAMUSCULAR at 04:04

## 2025-04-22 NOTE — PROGRESS NOTES
SUBJECTIVE     Chief Complaint   Patient presents with    Abdominal Pain     Follow up. Some bloating present.   Antibiotics currently on day 5.        HPI  Kenya Charles is a 44 y.o. female with multiple medical diagnoses as listed in the medical history and problem list that presents for follow-up    HPI     History of Present Illness    CHIEF COMPLAINT:  Patient presents today for follow up of H. pylori    H. PYLORI INFECTION AND GI SYMPTOMS:  She is on day 5 of a 10-day treatment course for H. pylori, taking metronidazole 20mg every 12 hours with immediate symptom relief. She experiences bloating, particularly at night, and has 3-4 bowel movements in the morning. The infection may have originated from an illness in [place] one year ago where she experienced vomiting, diarrhea, and required IV fluids and antibiotics.    MEDICAL HISTORY:  She has a history of hysterectomy. Recent colonoscopy showed a polyp which was removed and was negative for dysplasia.    CURRENT SYMPTOMS:  She reports fatigue and exhaustion, particularly after prolonged periods of work, with difficulty getting up. She also experiences neck discomfort characterized by tightness and pain.    MEDICATIONS:  She takes Kefir probiotic, vitamin D3, and B12 supplements.    ROS:  General: -fever, -chills, +fatigue, -weight gain, -weight loss  Eyes: -vision changes, -redness, -discharge  ENT: -ear pain, -nasal congestion, -sore throat  Cardiovascular: -chest pain, -palpitations, -lower extremity edema  Respiratory: -cough, -shortness of breath  Gastrointestinal: -abdominal pain, -nausea, -vomiting, +diarrhea, -constipation, -blood in stool, +bloating, +abdominal distention  Genitourinary: -dysuria, -hematuria, -frequency  Musculoskeletal: -joint pain, -muscle pain  Skin: -rash, -lesion  Neurological: -headache, -dizziness, -numbness, -tingling  Psychiatric: -anxiety, -depression, -sleep difficulty  Neck: +neck pain, +neck tension         PAST MEDICAL  "HISTORY:  Past Medical History:   Diagnosis Date    Constipation     PONV (postoperative nausea and vomiting)        ALLERGIES AND MEDICATIONS: updated and reviewed.  Review of patient's allergies indicates:   Allergen Reactions    Oxycodone-acetaminophen Itching, Edema and Other (See Comments)     Starting taking Roxicet Friday.  Starting experieincing facial swelling and general itching Saturday.  Starting taking Roxicet Friday.  Starting experieincing facial swelling and general itching Saturday.    hallucinations    Acetaminophen Other (See Comments)     hallucinations     Current Outpatient Medications   Medication Sig Dispense Refill    dicyclomine (BENTYL) 20 mg tablet Take 1 tablet (20 mg total) by mouth 3 (three) times daily as needed (abdominal pain). 60 tablet 2    omeprazole (PRILOSEC) 20 MG capsule Take 1 capsule (20 mg total) by mouth 2 (two) times a day. for 10 days 20 capsule 0    linaCLOtide (LINZESS) 72 mcg Cap capsule Take 1 capsule (72 mcg total) by mouth once daily. (Patient not taking: Reported on 4/22/2025) 30 capsule 2     No current facility-administered medications for this visit.     OBJECTIVE     Physical Exam  Vitals:    04/22/25 1509   BP: 102/79   Pulse: 79   Resp: 16   Temp: 98.4 °F (36.9 °C)    Body mass index is 26.49 kg/m².  Weight: 70 kg (154 lb 5.2 oz)   Height: 5' 4" (162.6 cm)     Physical Exam  Vitals reviewed.   Constitutional:       General: She is not in acute distress.  HENT:      Right Ear: External ear normal.      Left Ear: External ear normal.      Nose: Nose normal.      Mouth/Throat:      Mouth: Mucous membranes are moist.   Eyes:      Extraocular Movements: Extraocular movements intact.      Conjunctiva/sclera: Conjunctivae normal.      Pupils: Pupils are equal, round, and reactive to light.   Pulmonary:      Effort: Pulmonary effort is normal.   Abdominal:      General: There is no distension.      Palpations: Abdomen is soft.   Musculoskeletal:         General: No " swelling. Normal range of motion.      Cervical back: Normal range of motion.   Skin:     General: Skin is warm and dry.      Findings: No rash.   Neurological:      General: No focal deficit present.      Mental Status: She is alert and oriented to person, place, and time.   Psychiatric:         Mood and Affect: Mood normal.         Behavior: Behavior normal.       Health Maintenance         Date Due Completion Date    Influenza Vaccine (1) 06/30/2025 (Originally 9/1/2024) ---    COVID-19 Vaccine (1 - 2024-25 season) 04/22/2026 (Originally 9/1/2024) ---    Mammogram 04/16/2026 4/16/2025    Hemoglobin A1c (Diabetic Prevention Screening) 03/12/2028 3/12/2025    TETANUS VACCINE 04/22/2035 4/22/2025    RSV Vaccine (Age 60+ and Pregnant patients) (1 - 1-dose 75+ series) 10/02/2056 ---          ASSESSMENT     44 y.o. female with     1. H. pylori infection    2. Immunization due        PLAN:     1. H. pylori infection  Ongoing. Continue Pylera and omeprazole as ordered.     2. Immunization due  Due. Ordered.   - Tdap vaccine injection 0.5 mL  - hpv vaccine,9-magdiel (GARDASIL 9) vaccine 0.5 mL  - pneumoc 20-magdiel conj-dip cr(PF) (PREVNAR-20 (PF)) injection Syrg 0.5 mL    3. Other fatigue  Ongoing. Addressing. Counseled patient on possible symptom   Explained to patient that fatigue, while a very common symptom is usually a very complicated symptom to diagnose due to the high number of differential diagnosis.  Explained how routine blood work can often eliminate many of the more common high risk conditions and also explained that an exhaustive work up often still reveals no definitive diagnosis.  Counseled patient that in a lot of cases fatigue is a symptom of over all poor exercise habits, nutrition and sleep/stress.  Counseled that no matter what plan of care shows, we should move forward with increasing exercise (especially aerobic), improving nutrition from processed foods to more natural whole foods prepared at home and to  work on sleep hygiene and stress management.  Patient voiced understanding.       Assessment & Plan      IMPRESSION:  - Assessed H. pylori, currently on day 5 of treatment.  - Evaluated reported symptoms of bloating and abdominal pain.  - Reviewed recent test results: colonoscopy (polyp found, negative for dysplasia) and CT Abdomen (no acute findings).  - Thyroid function appears normal based on recent labs.  - Explained H. pylori can cause fatigue.    CHRONIC GASTRITIS AND H. PYLORI INFECTION:  - Noted the patient's ongoing abdominal pain and bloating.  - Acknowledged the patient's gastritis symptoms and their relation to H. pylori.  - Continued the 10-day antibiotic treatment for H. pylori, with the patient currently on day 5.  - Prescribed metronidazole 20mg every 12 hours.  - Recommend dietary restrictions to manage gastritis symptoms.  - Noted the patient's diagnosis of H. pylori through a stool test.  - Observed symptoms consistent with H. pylori, including abdominal pain, bloating, and frequent bowel movements.  - Discussed the prevalence of H. pylori cases and potential sources of infection.  - Explained that H. pylori tests can remain positive for up to 1 month after treatment, even if the infection has cleared.  - Continued metronidazole 20 mg every 12 hours for H. pylori treatment.  - Recommend taking probiotics concurrently to support gut health and increase good bacteria.  - Planned to retest for H. pylori after treatment completion.  - Discussed dietary restrictions for H. pylori: avoid spicy foods, greasy foods, alcohol, coffee, and processed foods.  - Advised that fruits and vegetables are recommended for H. pylori diet.    FATIGUE:  - Noted the patient's reports of severe fatigue.  - Explained that H. pylori can cause fatigue.  - Acknowledged the fatigue and discussed potential causes.  - Recommend vitamins D3 and B12 for energy.      FOLLOW-UP:  - Scheduled follow up in 1 month to repeat H. pylori  scar.         PURNIMA Kurtz  [facility]  04/22/2025 3:16 PM    I spent a total of 35 minutes on the day of the visit.This includes face to face time and non-face to face time preparing to see the patient (eg, review of tests), obtaining and/or reviewing separately obtained history, documenting clinical information in the electronic or other health record, independently interpreting results and communicating results to the patient/family/caregiver, or care coordinator.     Follow up if symptoms worsen or fail to improve.    This note was generated with the assistance of ambient listening technology. Verbal consent was obtained by the patient and accompanying visitor(s) for the recording of patient appointment to facilitate this note. I attest to having reviewed and edited the generated note for accuracy, though some syntax or spelling errors may persist. Please contact the author of this note for any clarification.

## 2025-04-23 ENCOUNTER — PATIENT MESSAGE (OUTPATIENT)
Dept: OTOLARYNGOLOGY | Facility: CLINIC | Age: 45
End: 2025-04-23
Payer: MEDICAID

## 2025-04-30 ENCOUNTER — OFFICE VISIT (OUTPATIENT)
Dept: GASTROENTEROLOGY | Facility: CLINIC | Age: 45
End: 2025-04-30
Payer: MEDICAID

## 2025-04-30 VITALS
DIASTOLIC BLOOD PRESSURE: 73 MMHG | HEART RATE: 90 BPM | HEIGHT: 64 IN | SYSTOLIC BLOOD PRESSURE: 111 MMHG | BODY MASS INDEX: 26.07 KG/M2 | WEIGHT: 152.69 LBS

## 2025-04-30 DIAGNOSIS — K58.1 IRRITABLE BOWEL SYNDROME WITH CONSTIPATION: Primary | ICD-10-CM

## 2025-04-30 DIAGNOSIS — A04.8 H. PYLORI INFECTION: ICD-10-CM

## 2025-04-30 PROCEDURE — 3044F HG A1C LEVEL LT 7.0%: CPT | Mod: CPTII,,, | Performed by: NURSE PRACTITIONER

## 2025-04-30 PROCEDURE — 99214 OFFICE O/P EST MOD 30 MIN: CPT | Mod: S$PBB,,, | Performed by: NURSE PRACTITIONER

## 2025-04-30 PROCEDURE — 99999 PR PBB SHADOW E&M-EST. PATIENT-LVL III: CPT | Mod: PBBFAC,,, | Performed by: NURSE PRACTITIONER

## 2025-04-30 PROCEDURE — 3078F DIAST BP <80 MM HG: CPT | Mod: CPTII,,, | Performed by: NURSE PRACTITIONER

## 2025-04-30 PROCEDURE — 3074F SYST BP LT 130 MM HG: CPT | Mod: CPTII,,, | Performed by: NURSE PRACTITIONER

## 2025-04-30 PROCEDURE — 99213 OFFICE O/P EST LOW 20 MIN: CPT | Mod: PBBFAC,PN | Performed by: NURSE PRACTITIONER

## 2025-04-30 PROCEDURE — 1159F MED LIST DOCD IN RCRD: CPT | Mod: CPTII,,, | Performed by: NURSE PRACTITIONER

## 2025-04-30 PROCEDURE — 3008F BODY MASS INDEX DOCD: CPT | Mod: CPTII,,, | Performed by: NURSE PRACTITIONER

## 2025-04-30 PROCEDURE — 1160F RVW MEDS BY RX/DR IN RCRD: CPT | Mod: CPTII,,, | Performed by: NURSE PRACTITIONER

## 2025-04-30 NOTE — PATIENT INSTRUCTIONS
- Resume Linzess 72 mcg daily for constipation  - Dicyclomine 20 mg three times per day as needed for abdominal discomfort  - OTC digestive enzymes with each meal

## 2025-04-30 NOTE — PROGRESS NOTES
Subjective:       Patient ID: Kenya Charles is a 44 y.o. female.    Chief Complaint: Follow-up and Abdominal Pain    45 y/o female with chronic abdominal pain and constipation presents to clinic for follow up. S/p colonoscopy  revealed one benign polyp removed; otherwise normal. Recently treated for H. Pylori infection by PCP. Completed 10 day course of Pylera with PPI ~ four days ago. Plans to test for HP eradication in one month. Reports normal BM daily while on antibiotic therapy. She is now constipated. No longer taking Linzess. Has intermittent LLQ abdominal pain after eating. Pain relieved by BM. Taking dicyclomine with moderate relief.         Past Medical History:   Diagnosis Date    Constipation     PONV (postoperative nausea and vomiting)        Past Surgical History:   Procedure Laterality Date     SECTION      COLONOSCOPY N/A 2025    Procedure: COLONOSCOPY;  Surgeon: Robert Arevalo MD;  Location: [facility];  Service: Endoscopy;  Laterality: N/A;    CYSTOSCOPY N/A 2024    Procedure: CYSTOSCOPY;  Surgeon: Giselle Chaudhary MD;  Location: [facility] OR;  Service: OB/GYN;  Laterality: N/A;    ENDOMETRIAL ABLATION N/A 03/10/2022    Procedure: ABLATION, ENDOMETRIUM;  Surgeon: Royce Ruiz MD;  Location: [facility] OR;  Service: OB/GYN;  Laterality: N/A;    HYSTERECTOMY, TOTAL, LAPAROSCOPIC, WITH SALPINGECTOMY N/A 2024    Procedure: HYSTERECTOMY,TOTAL,LAPAROSCOPIC,WITH SALPINGECTOMY;  Surgeon: Giselle Chaudhary MD;  Location: [facility] OR;  Service: OB/GYN;  Laterality: N/A;    HYSTEROSCOPY WITH DILATION AND CURETTAGE OF UTERUS N/A 03/10/2022    Procedure: HYSTEROSCOPY, WITH DILATION AND CURETTAGE OF UTERUS;  Surgeon: Royce Ruiz MD;  Location: [facility] OR;  Service: OB/GYN;  Laterality: N/A;    TUBAL LIGATION         Family History   Problem Relation Name Age of Onset    Hypertension Mother      Cervical cancer Maternal Aunt      Ovarian cancer Maternal Aunt      Cancer Paternal  Aunt      Breast cancer Maternal Grandmother         Social History     Socioeconomic History    Marital status:    Tobacco Use    Smoking status: Never     Passive exposure: Never    Smokeless tobacco: Never   Substance and Sexual Activity    Alcohol use: No    Drug use: Never    Sexual activity: Yes     Partners: Male     Birth control/protection: See Surgical Hx, Condom     Social Drivers of Health     Financial Resource Strain: Medium Risk (3/7/2025)    Overall Financial Resource Strain (CARDIA)     Difficulty of Paying Living Expenses: Somewhat hard   Food Insecurity: Patient Declined (3/7/2025)    Hunger Vital Sign     Worried About Running Out of Food in the Last Year: Patient declined     Ran Out of Food in the Last Year: Patient declined   Transportation Needs: No Transportation Needs (3/7/2025)    PRAPARE - Transportation     Lack of Transportation (Medical): No     Lack of Transportation (Non-Medical): No   Physical Activity: Unknown (3/7/2025)    Exercise Vital Sign     Days of Exercise per Week: 0 days   Stress: Stress Concern Present (3/7/2025)    [employer] - Occupational Stress Questionnaire     Feeling of Stress : To some extent   Housing Stability: Low Risk  (3/7/2025)    Housing Stability Vital Sign     Unable to Pay for Housing in the Last Year: No     Number of Times Moved in the Last Year: 0     Homeless in the Last Year: No       Review of Systems   Constitutional:  Negative for appetite change and unexpected weight change.   HENT:  Negative for trouble swallowing.    Respiratory:  Negative for shortness of breath.    Cardiovascular:  Negative for chest pain.   Gastrointestinal:  Positive for abdominal pain and constipation. Negative for blood in stool, diarrhea, nausea and vomiting.   Hematological:  Negative for adenopathy. Does not bruise/bleed easily.   Psychiatric/Behavioral:  Negative for decreased concentration.          Objective:     Vitals:     "04/30/25 1407   BP: 111/73   BP Location: Left arm   Patient Position: Sitting   Pulse: 90   Weight: 69.3 kg (152 lb 10.7 oz)   Height: 5' 4" (1.626 m)          Physical Exam  Constitutional:       General: She is not in acute distress.     Appearance: Normal appearance.   HENT:      Head: Normocephalic.   Eyes:      Conjunctiva/sclera: Conjunctivae normal.   Pulmonary:      Effort: Pulmonary effort is normal. No respiratory distress.   Musculoskeletal:         General: Normal range of motion.      Cervical back: Normal range of motion.   Skin:     General: Skin is warm and dry.   Neurological:      Mental Status: She is alert and oriented to person, place, and time.   Psychiatric:         Mood and Affect: Mood normal.         Behavior: Behavior normal.               Assessment:         ICD-10-CM ICD-9-CM   1. Irritable bowel syndrome with constipation  K58.1 564.1   2. H. pylori infection  A04.8 041.86       Plan:       Irritable bowel syndrome with constipation  - IBS diet  - Resume Linzess 72 mcg daily for constipation  - Dicyclomine 20 mg three times per day as needed for abdominal discomfort  - OTC digestive enzymes with each meal    H. pylori infection  - Follow up with PCP to test stool for eradication in one month    Follow up if symptoms worsen or fail to improve.     Patient's Medications   New Prescriptions    No medications on file   Previous Medications    DICYCLOMINE (BENTYL) 20 MG TABLET    Take 1 tablet (20 mg total) by mouth 3 (three) times daily as needed (abdominal pain).    LINACLOTIDE (LINZESS) 72 MCG CAP CAPSULE    Take 1 capsule (72 mcg total) by mouth once daily.    OMEPRAZOLE (PRILOSEC) 20 MG CAPSULE    Take 1 capsule (20 mg total) by mouth 2 (two) times a day. for 10 days   Modified Medications    No medications on file   Discontinued Medications    No medications on file             "

## 2025-05-01 NOTE — PROGRESS NOTES
History & Physical    Subjective     History of Present Illness:  43 y/o female with chronic abdominal pain and constipation presents to surgery clinic.  S/p colonoscopy  revealed one benign polyp removed; otherwise normal. Recently treated for H. Pylori infection by PCP. Completed 10 day course of Pylera with PPI ~ four days ago. Plans to test for HP eradication in one month. Reports normal BM daily while on antibiotic therapy. She is now constipated. No longer taking Linzess. Has intermittent LLQ abdominal pain after eating. Pain relieved by BM. Taking dicyclomine with moderate relief.      The patient is found to have a umbilical hernia on CT scan.       Told her to monitor this for now.      If she develops pain or tenderness or notices this bulges more than she is comfortable if she can come back for surgical intervention.  No chief complaint on file.      Review of patient's allergies indicates:   Allergen Reactions    Oxycodone-acetaminophen Itching, Edema and Other (See Comments)     Starting taking Roxicet Friday.  Starting experieincing facial swelling and general itching Saturday.  Starting taking Roxicet Friday.  Starting experieincing facial swelling and general itching Saturday.    hallucinations    Acetaminophen Other (See Comments)     hallucinations       Current Medications[1]    Past Medical History:   Diagnosis Date    Constipation     PONV (postoperative nausea and vomiting)      Past Surgical History:   Procedure Laterality Date     SECTION      COLONOSCOPY N/A 2025    Procedure: COLONOSCOPY;  Surgeon: Robert Arevalo MD;  Location: [facility];  Service: Endoscopy;  Laterality: N/A;    CYSTOSCOPY N/A 2024    Procedure: CYSTOSCOPY;  Surgeon: Giselle Chaudhary MD;  Location: [facility] OR;  Service: OB/GYN;  Laterality: N/A;    ENDOMETRIAL ABLATION N/A 03/10/2022    Procedure: ABLATION, ENDOMETRIUM;  Surgeon: Royce Ruiz MD;  Location: [facility] OR;  Service: OB/GYN;   Laterality: N/A;    HYSTERECTOMY, TOTAL, LAPAROSCOPIC, WITH SALPINGECTOMY N/A 08/22/2024    Procedure: HYSTERECTOMY,TOTAL,LAPAROSCOPIC,WITH SALPINGECTOMY;  Surgeon: Giselle Chaudhary MD;  Location: [facility] OR;  Service: OB/GYN;  Laterality: N/A;    HYSTEROSCOPY WITH DILATION AND CURETTAGE OF UTERUS N/A 03/10/2022    Procedure: HYSTEROSCOPY, WITH DILATION AND CURETTAGE OF UTERUS;  Surgeon: Royce Ruiz MD;  Location: [facility] OR;  Service: OB/GYN;  Laterality: N/A;    TUBAL LIGATION       Family History   Problem Relation Name Age of Onset    Hypertension Mother      Cervical cancer Maternal Aunt      Ovarian cancer Maternal Aunt      Cancer Paternal Aunt      Breast cancer Maternal Grandmother       Social History[2]     Review of Systems:  Review of Systems   Constitutional:  Negative for appetite change, fatigue, fever and unexpected weight change.   HENT:  Negative for sore throat and trouble swallowing.    Eyes: Negative.    Respiratory:  Negative for cough, shortness of breath and wheezing.    Cardiovascular:  Negative for chest pain and leg swelling.   Gastrointestinal:  Negative for abdominal distention, abdominal pain, blood in stool, constipation, diarrhea, nausea and vomiting.   Endocrine: Negative.    Genitourinary: Negative.    Musculoskeletal:  Negative for back pain.   Skin: Negative.  Negative for rash.   Allergic/Immunologic: Negative.    Neurological: Negative.    Hematological: Negative.    Psychiatric/Behavioral:  Negative for confusion.         Objective     Vital Signs (Most Recent)              Physical Exam:  Physical Exam  Vitals and nursing note reviewed.   Constitutional:       Appearance: She is well-developed.   HENT:      Head: Normocephalic and atraumatic.   Cardiovascular:      Rate and Rhythm: Normal rate.      Heart sounds: Normal heart sounds.   Pulmonary:      Effort: Pulmonary effort is normal.   Abdominal:      General: Bowel sounds are normal. There is no distension.       Palpations: Abdomen is soft.      Tenderness: There is no abdominal tenderness.      Hernia: A hernia (small umbilical) is present.   Musculoskeletal:         General: Normal range of motion.      Cervical back: Normal range of motion.   Skin:     General: Skin is warm and dry.      Capillary Refill: Capillary refill takes less than 2 seconds.   Neurological:      Mental Status: She is alert and oriented to person, place, and time.   Psychiatric:         Behavior: Behavior normal.     Laboratory  CBC: Reviewed  CMP: Reviewed    Diagnostic Results:  CT: Reviewed  Umbilical hernia       Assessment and Plan   44-year-old female with umbilical hernia, abdominal pain from GI sources    PLAN:    Discussed robotic versus laparoscopic umbilical hernia repair with mesh placement.    Patient will call back if she would like to schedule her suture                [1]   Current Outpatient Medications   Medication Sig Dispense Refill    dicyclomine (BENTYL) 20 mg tablet Take 1 tablet (20 mg total) by mouth 3 (three) times daily as needed (abdominal pain). 60 tablet 2    linaCLOtide (LINZESS) 72 mcg Cap capsule Take 1 capsule (72 mcg total) by mouth once daily. (Patient not taking: Reported on 4/22/2025) 30 capsule 2    omeprazole (PRILOSEC) 20 MG capsule Take 1 capsule (20 mg total) by mouth 2 (two) times a day. for 10 days 20 capsule 0     No current facility-administered medications for this visit.   [2]   Social History  Tobacco Use    Smoking status: Never     Passive exposure: Never    Smokeless tobacco: Never   Substance Use Topics    Alcohol use: No    Drug use: Never

## 2025-05-30 ENCOUNTER — PATIENT MESSAGE (OUTPATIENT)
Dept: PRIMARY CARE CLINIC | Facility: CLINIC | Age: 45
End: 2025-05-30
Payer: MEDICAID

## 2025-06-02 ENCOUNTER — OFFICE VISIT (OUTPATIENT)
Dept: OTOLARYNGOLOGY | Facility: CLINIC | Age: 45
End: 2025-06-02
Payer: MEDICAID

## 2025-06-02 VITALS
BODY MASS INDEX: 25.97 KG/M2 | SYSTOLIC BLOOD PRESSURE: 115 MMHG | WEIGHT: 152.13 LBS | HEIGHT: 64 IN | HEART RATE: 72 BPM | DIASTOLIC BLOOD PRESSURE: 78 MMHG

## 2025-06-02 DIAGNOSIS — E04.2 MULTINODULAR GOITER: Chronic | ICD-10-CM

## 2025-06-02 DIAGNOSIS — E04.1 THYROID NODULE: Primary | Chronic | ICD-10-CM

## 2025-06-02 PROCEDURE — 3074F SYST BP LT 130 MM HG: CPT | Mod: CPTII,,, | Performed by: OTOLARYNGOLOGY

## 2025-06-02 PROCEDURE — 1159F MED LIST DOCD IN RCRD: CPT | Mod: CPTII,,, | Performed by: OTOLARYNGOLOGY

## 2025-06-02 PROCEDURE — 99213 OFFICE O/P EST LOW 20 MIN: CPT | Mod: PBBFAC,PN | Performed by: OTOLARYNGOLOGY

## 2025-06-02 PROCEDURE — 3008F BODY MASS INDEX DOCD: CPT | Mod: CPTII,,, | Performed by: OTOLARYNGOLOGY

## 2025-06-02 PROCEDURE — 99204 OFFICE O/P NEW MOD 45 MIN: CPT | Mod: S$PBB,,, | Performed by: OTOLARYNGOLOGY

## 2025-06-02 PROCEDURE — 3078F DIAST BP <80 MM HG: CPT | Mod: CPTII,,, | Performed by: OTOLARYNGOLOGY

## 2025-06-02 PROCEDURE — 99999 PR PBB SHADOW E&M-EST. PATIENT-LVL III: CPT | Mod: PBBFAC,,, | Performed by: OTOLARYNGOLOGY

## 2025-06-02 PROCEDURE — 3044F HG A1C LEVEL LT 7.0%: CPT | Mod: CPTII,,, | Performed by: OTOLARYNGOLOGY

## 2025-06-03 ENCOUNTER — TELEPHONE (OUTPATIENT)
Dept: INTERVENTIONAL RADIOLOGY/VASCULAR | Facility: CLINIC | Age: 45
End: 2025-06-03
Payer: MEDICAID

## 2025-06-05 ENCOUNTER — LAB VISIT (OUTPATIENT)
Dept: LAB | Facility: HOSPITAL | Age: 45
End: 2025-06-05
Payer: MEDICAID

## 2025-06-05 DIAGNOSIS — A04.8 H. PYLORI INFECTION: ICD-10-CM

## 2025-06-05 PROCEDURE — 87338 HPYLORI STOOL AG IA: CPT

## 2025-06-06 LAB
H. PYLORI SURFACE ANTIGEN, INTERPRETATION (OHS): NEGATIVE
HELICOBACTER PYLORI SURFACE ANTIGEN (OHS): 0.35

## 2025-06-09 ENCOUNTER — RESULTS FOLLOW-UP (OUTPATIENT)
Dept: PRIMARY CARE CLINIC | Facility: CLINIC | Age: 45
End: 2025-06-09

## 2025-06-17 ENCOUNTER — CLINICAL SUPPORT (OUTPATIENT)
Dept: PRIMARY CARE CLINIC | Facility: CLINIC | Age: 45
End: 2025-06-17
Payer: MEDICAID

## 2025-06-17 ENCOUNTER — TELEPHONE (OUTPATIENT)
Dept: INTERVENTIONAL RADIOLOGY/VASCULAR | Facility: HOSPITAL | Age: 45
End: 2025-06-17
Payer: MEDICAID

## 2025-06-17 DIAGNOSIS — Z23 NEED FOR HPV VACCINATION: Primary | ICD-10-CM

## 2025-06-17 PROCEDURE — 90471 IMMUNIZATION ADMIN: CPT | Mod: PBBFAC,PN

## 2025-06-17 PROCEDURE — 99999PBSHW PR PBB SHADOW TECHNICAL ONLY FILED TO HB: Mod: PBBFAC,,,

## 2025-06-17 PROCEDURE — 90651 9VHPV VACCINE 2/3 DOSE IM: CPT | Mod: PBBFAC,PN

## 2025-06-17 RX ADMIN — HUMAN PAPILLOMAVIRUS 9-VALENT VACCINE, RECOMBINANT 0.5 ML: 30; 40; 60; 40; 20; 20; 20; 20; 20 INJECTION, SUSPENSION INTRAMUSCULAR at 03:06

## 2025-06-17 NOTE — PROGRESS NOTES
2nd dose of HPV vaccine administered to L deltoid using aseptic technique. No adverse reactions noted during visit.

## 2025-06-18 ENCOUNTER — HOSPITAL ENCOUNTER (OUTPATIENT)
Dept: INTERVENTIONAL RADIOLOGY/VASCULAR | Facility: HOSPITAL | Age: 45
Discharge: HOME | End: 2025-06-18
Attending: OTOLARYNGOLOGY
Payer: MEDICAID

## 2025-06-18 VITALS
WEIGHT: 153 LBS | DIASTOLIC BLOOD PRESSURE: 65 MMHG | SYSTOLIC BLOOD PRESSURE: 111 MMHG | BODY MASS INDEX: 26.12 KG/M2 | RESPIRATION RATE: 18 BRPM | HEIGHT: 64 IN | HEART RATE: 79 BPM | OXYGEN SATURATION: 99 % | TEMPERATURE: 98 F

## 2025-06-18 DIAGNOSIS — E04.1 THYROID NODULE: ICD-10-CM

## 2025-06-18 PROCEDURE — 63600175 PHARM REV CODE 636 W HCPCS: Performed by: RADIOLOGY

## 2025-06-18 PROCEDURE — 88173 CYTOPATH EVAL FNA REPORT: CPT | Mod: TC | Performed by: OTOLARYNGOLOGY

## 2025-06-18 RX ORDER — LIDOCAINE HYDROCHLORIDE 10 MG/ML
INJECTION, SOLUTION INFILTRATION; PERINEURAL
Status: COMPLETED | OUTPATIENT
Start: 2025-06-18 | End: 2025-06-18

## 2025-06-18 RX ADMIN — LIDOCAINE HYDROCHLORIDE 5 ML: 10 INJECTION, SOLUTION INFILTRATION; PERINEURAL at 01:06

## 2025-06-18 NOTE — H&P
Radiology History & Physical      SUBJECTIVE:     Chief Complaint: thyroid nodules    History of Present Illness:  Kenya Charles is a 44 y.o. female who presents for right thyroid nodule FNA.    Past Medical History:   Diagnosis Date    Constipation     PONV (postoperative nausea and vomiting)      Past Surgical History:   Procedure Laterality Date     SECTION      COLONOSCOPY N/A 2025    Procedure: COLONOSCOPY;  Surgeon: Robert Arevalo MD;  Location: [facility];  Service: Endoscopy;  Laterality: N/A;    CYSTOSCOPY N/A 2024    Procedure: CYSTOSCOPY;  Surgeon: Giselle Chaudhary MD;  Location: [facility] OR;  Service: OB/GYN;  Laterality: N/A;    ENDOMETRIAL ABLATION N/A 03/10/2022    Procedure: ABLATION, ENDOMETRIUM;  Surgeon: Royce Ruiz MD;  Location: [facility] OR;  Service: OB/GYN;  Laterality: N/A;    HYSTERECTOMY, TOTAL, LAPAROSCOPIC, WITH SALPINGECTOMY N/A 2024    Procedure: HYSTERECTOMY,TOTAL,LAPAROSCOPIC,WITH SALPINGECTOMY;  Surgeon: Giselle Chaudhary MD;  Location: [facility] OR;  Service: OB/GYN;  Laterality: N/A;    HYSTEROSCOPY WITH DILATION AND CURETTAGE OF UTERUS N/A 03/10/2022    Procedure: HYSTEROSCOPY, WITH DILATION AND CURETTAGE OF UTERUS;  Surgeon: Royce Ruiz MD;  Location: [facility] OR;  Service: OB/GYN;  Laterality: N/A;    TUBAL LIGATION         Home Meds:   Prior to Admission medications    Medication Sig Start Date End Date Taking? Authorizing Provider   linaCLOtide (LINZESS) 72 mcg Cap capsule Take 1 capsule (72 mcg total) by mouth once daily. 3/11/25  Yes Cha Madden FNP   dicyclomine (BENTYL) 20 mg tablet Take 1 tablet (20 mg total) by mouth 3 (three) times daily as needed (abdominal pain). 3/11/25   Cha Madden FNP   omeprazole (PRILOSEC) 20 MG capsule Take 1 capsule (20 mg total) by mouth 2 (two) times a day. for 10 days 25  Silverio Franco MD     Anticoagulants/Antiplatelets: no anticoagulation    Allergies:   Review of  "patient's allergies indicates:   Allergen Reactions    Oxycodone-acetaminophen Itching, Edema and Other (See Comments)     Starting taking Roxicet Friday.  Starting experieincing facial swelling and general itching Saturday.  Starting taking Roxicet Friday.  Starting experieincing facial swelling and general itching Saturday.    hallucinations    Acetaminophen Other (See Comments)     hallucinations         OBJECTIVE:     Vital Signs (Most Recent)  Temp: 98.1 °F (36.7 °C) (06/18/25 1240)  Pulse: 73 (06/18/25 1240)  Resp: 12 (06/18/25 1240)  BP: 120/68 (06/18/25 1243)  SpO2: 100 % (06/18/25 1240)    Physical Exam:  General: no acute distress  Mental Status: alert and oriented to person, place and time  HEENT: normocephalic, atraumatic  Chest: unlabored breathing  Heart: regular heart rate  Abdomen: nondistended  Extremity: moves all extremities    Laboratory  No results found for: "INR", "PT", "PTT"    Lab Results   Component Value Date    WBC 5.44 03/12/2025    HGB 13.5 03/12/2025    HCT 41.6 03/12/2025    MCV 94 03/12/2025     03/12/2025      Lab Results   Component Value Date    GLU 92 03/12/2025     03/12/2025    K 4.5 03/12/2025     03/12/2025    CO2 25 03/12/2025    BUN 13 03/12/2025    CREATININE 0.7 03/12/2025    CALCIUM 9.0 03/12/2025    ALT 19 03/12/2025    AST 17 03/12/2025    ALBUMIN 3.9 03/12/2025    BILITOT 0.5 03/12/2025       ASSESSMENT/PLAN:     Sedation Plan: local anesthesia  Patient will undergo right thyroid nodule FNA.    Ismael Gavin MD  Interventional Radiology  Department of Radiology    "

## 2025-06-18 NOTE — SEDATION DOCUMENTATION
Pt arrived to [facility] for FNA . Pt oriented to unit and staff. Plan of care reviewed with patient, patient verbalizes understanding. Comfort measures utilized. Pt safely transferred from stretcher to procedural table. Fall risk reviewed with patient, fall risk interventions maintained. Safety strap applied, positioner pillows utilized to minimize pressure points. Blankets applied. Pt prepped and draped utilizing standard sterile technique. Patient placed on continuous monitoring. Timeouts completed utilizing standard universal time-out, per department and facility policy. RN to remain at bedside, continuous monitoring maintained. Pt resting comfortably.

## 2025-06-18 NOTE — PROCEDURES
Radiology Post-Procedure Note    Pre Op Diagnosis: right thyroid nodule  Post Op Diagnosis: Same    Procedure: Right thyroid nodule FNA    Procedure performed by: Ismael Gavin MD    Written Informed Consent Obtained: Yes  Specimen Removed: YES 3, 25g FNA passes  Estimated Blood Loss: Minimal    Findings:   Heterogenous right thyroid nodule. No complications.     Patient tolerated procedure well.    Ismael Gavin MD  Interventional Radiology  Department of Radiology

## 2025-06-18 NOTE — DISCHARGE INSTRUCTIONS
BIOPSY DISCHARGE EDUCATION       Information:   A biopsy is a procedure in which a biopsy needle is used to remove small amounts of tissue to evaluate for organ dysfunction or the presence of cancerous versus benign (non-cancerous) tissue.      What should I expect after the biopsy?      There may be some soreness around the biopsy site.    You may return to work after 24 hours unless your primary doctor instructs you otherwise.    You do not have any diet restrictions because of this procedure but should continue any that were given to you by any other doctors.    Continue all previously prescribed medications with the exception of Coumadin/warfarin which should be resumed after 24 hours. Pradaxa, Xarelto, Eliquis or Savaysa can be resumed after 48 hours.     Bathing & Wound Care:    You may shower after 24 hours; do not tub bath or submerge in water for 3 days (bath tub, hot tub, swimming pool, river or any other body of water).    Dressing to stay on 2-3 days (clean and dry)    If the biopsy site(s) become red, tender, swollen, or starts to drain, contact us.    Avoid heavy lifting and strenuous activity for 3 days.     Follow-up visit information:   Your ordering physician will typically get your biopsy results in three to five business days. If you do not hear from the ordering physician in 7 business days, please call his/her office to set up an appointment to review the results. Follow up with Interventional Radiology is not usually necessary.       Occasionally, a situation will require prompt attention and an emergency room visit is necessary:    Sudden chest pain, shortness of breath, or fainting    Increasing redness, swelling or drainage from the biopsy site    Increasing pain not relieved by medication    Bleeding or drainage from the needle site that is saturating the dressing    You have shaking, chills and/or a temperature over 100.3°F    New, sudden difficulty breathing    Drop in blood pressure,  and/or light-headed feeling    Interventional Radiology Clinic   For complications   [phone]. Monday - Friday, 8:00 am - 4:00 pm    [phone] After hours and on holidays. Ask to speak with the interventional radiologist on call.     For Scheduling   [phone] Monday - Friday, 8:00 am - 4:00 pm terventional radiologist on call.

## 2025-06-19 LAB
ESTROGEN SERPL-MCNC: ABNORMAL PG/ML
INSULIN SERPL-ACNC: ABNORMAL U[IU]/ML
LAB AP CLINICAL INFORMATION: ABNORMAL
LAB AP GROSS DESCRIPTION: ABNORMAL
LAB AP NON-GYN INTERPRETATION SPECIMEN 1: ABNORMAL
LAB AP PATHOLOGIST ADEQUACY INTERP: ABNORMAL
LAB AP PERFORMING LOCATION(S): ABNORMAL
LAB AP REPORT FOOTNOTES: ABNORMAL

## 2025-06-24 ENCOUNTER — RESULTS FOLLOW-UP (OUTPATIENT)
Dept: OTOLARYNGOLOGY | Facility: CLINIC | Age: 45
End: 2025-06-24

## 2025-07-23 ENCOUNTER — TELEPHONE (OUTPATIENT)
Dept: OTOLARYNGOLOGY | Facility: CLINIC | Age: 45
End: 2025-07-23
Payer: MEDICAID

## 2025-07-23 ENCOUNTER — PATIENT MESSAGE (OUTPATIENT)
Dept: OTOLARYNGOLOGY | Facility: CLINIC | Age: 45
End: 2025-07-23
Payer: MEDICAID

## 2025-07-24 ENCOUNTER — E-CONSULT (OUTPATIENT)
Dept: ENDOCRINOLOGY | Facility: CLINIC | Age: 45
End: 2025-07-24
Payer: MEDICAID

## 2025-07-24 ENCOUNTER — OFFICE VISIT (OUTPATIENT)
Dept: OTOLARYNGOLOGY | Facility: CLINIC | Age: 45
End: 2025-07-24
Payer: MEDICAID

## 2025-07-24 DIAGNOSIS — E04.1 THYROID NODULE: Primary | ICD-10-CM

## 2025-07-24 DIAGNOSIS — E04.2 MULTINODULAR GOITER: ICD-10-CM

## 2025-07-24 PROCEDURE — 99499 UNLISTED E&M SERVICE: CPT | Mod: S$PBB,,, | Performed by: STUDENT IN AN ORGANIZED HEALTH CARE EDUCATION/TRAINING PROGRAM

## 2025-07-24 NOTE — PROGRESS NOTES
Otolaryngology Telemedicine Note    Kenya Charles  Encounter Date: 7/24/2025   YOB: 1980  Referring Physician: No referring provider defined for this encounter.   PCP: Silverio Franco MD    This is a telehealth visit that was performed at [facility]. Verbal consent to participate in video visit was obtained.    Each patient to whom he or she provides medical services by telemedicine is:  (1) informed of the relationship between the physician and patient and the respective role of any other health care provider with respect to management of the patient; and (2) notified that he or she may decline to receive medical services by telemedicine and may withdraw from such care at any time.  I discussed with the patient that:  - I would evaluate the patient and recommend diagnostics and treatments based on my assessment  - Our sessions are not being recorded and that personal health information is protected  - Our team would provide follow up care in person if/when the patient needs it    The patient location is: [place], [place]  The chief complaint leading to consultation is: Follow up results of thyroid nodule molecular testing  Visit type: Virtual visit with synchronous audio and video  Total time spent with patient: 20 minutes    HPI: Kenya Charles is a 44 y.o. female who presents via virtual visit to follow up on results of testing of right thyroid nodule. She reports that she has had no issues since her FNA biopsy regarding the thyroid. She does report a feeling of neck tightness which seems to be worse when raising her arms above her head.      ROS     Review of patient's allergies indicates:   Allergen Reactions    Oxycodone-acetaminophen Itching, Edema and Other (See Comments)     Starting taking Roxicet Friday.  Starting experieincing facial swelling and general itching Saturday.  Starting taking Roxicet Friday.  Starting experieincing facial swelling and general itching  Saturday.    hallucinations    Acetaminophen Other (See Comments)     hallucinations       Past Medical History:   Diagnosis Date    Constipation     PONV (postoperative nausea and vomiting)        Past Surgical History:   Procedure Laterality Date     SECTION      COLONOSCOPY N/A 2025    Procedure: COLONOSCOPY;  Surgeon: Robert Arevalo MD;  Location: [facility] ENDO;  Service: Endoscopy;  Laterality: N/A;    CYSTOSCOPY N/A 2024    Procedure: CYSTOSCOPY;  Surgeon: Giselle Chaudhary MD;  Location: [facility] OR;  Service: OB/GYN;  Laterality: N/A;    ENDOMETRIAL ABLATION N/A 03/10/2022    Procedure: ABLATION, ENDOMETRIUM;  Surgeon: Royce Ruiz MD;  Location: [facility] OR;  Service: OB/GYN;  Laterality: N/A;    HYSTERECTOMY, TOTAL, LAPAROSCOPIC, WITH SALPINGECTOMY N/A 2024    Procedure: HYSTERECTOMY,TOTAL,LAPAROSCOPIC,WITH SALPINGECTOMY;  Surgeon: Giselle Chaudhary MD;  Location: [facility] OR;  Service: OB/GYN;  Laterality: N/A;    HYSTEROSCOPY WITH DILATION AND CURETTAGE OF UTERUS N/A 03/10/2022    Procedure: HYSTEROSCOPY, WITH DILATION AND CURETTAGE OF UTERUS;  Surgeon: Royce Ruiz MD;  Location: [facility] OR;  Service: OB/GYN;  Laterality: N/A;    TUBAL LIGATION         Social History[1]    Family History   Problem Relation Name Age of Onset    Hypertension Mother      Cervical cancer Maternal Aunt      Ovarian cancer Maternal Aunt      Cancer Paternal Aunt      Breast cancer Maternal Grandmother         Encounter Medications[2]    Physical Exam:  There were no vitals filed for this visit.    Constitutional  General Appearance: well nourished, well-developed, alert, oriented, in no acute distress  Communication: ability, understanding, voice quality normal  Head and Face  Inspection: normocephalic, atraumatic, no scars, lesions or masses    Eyes  Ocular Motility / Alignment: normal alignment, motility, no proptosis    Ears  Hearing: speech reception thresholds grossly normal      Nose  External  Nose: no lesions, trauma or deformity  Neck  Inspection: no erythema, no obvious large masses visible  Chest / Respiratory  Chest: no stridor or retractions, normal effort and expansion  Neurological  Cranial Nerves: grossly intact  General: alert and oriented        Diagnostic studies:     Thyroid ultrasound 3/20/2025:images interpreted by me and discussed with patient today.      FINDINGS:  Right lobe of the thyroid measures 5.4 x 2.0 x 2.7 cm.  Left lobe of the thyroid measures 4.8 x 1.8 x 1.2 cm.  Thyroid demonstrates normal background parenchyma and perfusion.     Large mixed cystic and solid nodule in the right lobe measuring 4.0 x 1.8 x 2.4 cm consistent with a TR 3 nodule meeting criteria for fine-needle aspiration.     Solid isoechoic nodule in the left lobe measuring 1.4 x 0.9 x 1.2 cm consistent with a TR 3 nodule meeting criteria for follow-up ultrasound in 1, 2, 3, and 5 years.  A few additional smaller nodules in the left lobe which do not meet criteria for follow-up or fine-needle aspiration.     No suspicious lymph nodes.     Impression:     Right TR 3 nodule meeting criteria for fine-needle aspiration.     Left TR 3 nodule meeting criteria for follow-up ultrasound in 1, 2, 3, and 5 years.    FNA biopsy results 6/18/2025:  1:  Thyroid, Right               Satisfactory for evaluation             [place] [facility] [place] [facility] Thyroid Cytology Category III: Atypia Undetermined Significance (AUS)                 Macrophages       7/21/2025: ThyGeNEXT molecular testing of right thyroid nodule FNA material: No mutations detected    7/21/2025: [id number] right thyroid nodule FNA materia : insufficient material for analysis    Assessment and Plan:  Kenya Charles is a 44 y.o. female with     Thyroid nodule    Multinodular goiter       We had a long discussion on pathology results, thyroid ultrasound results and molecular testing results of FNA sample of the right thyroid lobe. Discussed with Mrs. Charles that  FNA biopsy showed atypia of undetermined significance and further analysis with molecular testing via ThyGeNEXT oncologic panel was negative for any mutations.  There was insufficient mRNA to complete analysis ThyraMIRv2.  This information indicates in 85-90% probability of benign pathology. I will refer her to endocrinology for further evaluation.     Marcie Tauzin Wilkinson, MD [facility]          [1]   Social History  Socioeconomic History    Marital status:    Tobacco Use    Smoking status: Never     Passive exposure: Never    Smokeless tobacco: Never   Substance and Sexual Activity    Alcohol use: No    Drug use: Never    Sexual activity: Yes     Partners: Male     Birth control/protection: See Surgical Hx, Condom     Social Drivers of Health     Financial Resource Strain: Medium Risk (3/7/2025)    Overall Financial Resource Strain (CARDIA)     Difficulty of Paying Living Expenses: Somewhat hard   Food Insecurity: Patient Declined (3/7/2025)    Hunger Vital Sign     Worried About Running Out of Food in the Last Year: Patient declined     Ran Out of Food in the Last Year: Patient declined   Transportation Needs: No Transportation Needs (3/7/2025)    PRAPARE - Transportation     Lack of Transportation (Medical): No     Lack of Transportation (Non-Medical): No   Physical Activity: Unknown (3/7/2025)    Exercise Vital Sign     Days of Exercise per Week: 0 days   Stress: Stress Concern Present (3/7/2025)    [employer] [place] [place] [employer] - Occupational Stress Questionnaire     Feeling of Stress : To some extent   Housing Stability: Low Risk  (3/7/2025)    Housing Stability Vital Sign     Unable to Pay for Housing in the Last Year: No     Number of Times Moved in the Last Year: 0     Homeless in the Last Year: No   [2]   Outpatient Encounter Medications as of 7/24/2025   Medication Sig Dispense Refill    dicyclomine (BENTYL) 20 mg tablet Take 1 tablet (20 mg total) by mouth 3  (three) times daily as needed (abdominal pain). 60 tablet 2    linaCLOtide (LINZESS) 72 mcg Cap capsule Take 1 capsule (72 mcg total) by mouth once daily. 30 capsule 2    omeprazole (PRILOSEC) 20 MG capsule Take 1 capsule (20 mg total) by mouth 2 (two) times a day. for 10 days 20 capsule 0     No facility-administered encounter medications on file as of 7/24/2025.

## 2025-07-24 NOTE — CONSULTS
[facility]  Response for E-Consult     Patient Name: Kenya Charles  MRN: [id number]  Primary Care Provider: Silverio Franco MD   Requesting Provider: Claudia Nelson*  E-Consult to Endocrinology  Consult performed by: Bienvenido Dozier DO  Consult ordered by: Lawanda Nelson MD  Reason for consult: Thyroid Nodule  Assessment/Recommendations: See Note - Referral needed      After evaluation of your eConsult clinical questions, I believe the patient should be scheduled for an office visit in our specialty due to abnormal FNA with in ability for molecular testing.    For purpose of seeing endocrinology a referral is needed not an E-consult.  Please place an ambulatory referral to endocrinology and I can message staff to see if it is possible to arrange a same day visit and biopsy combined.  The physician at that time can hold extra samples for molecular marker testing as well.      Total time of Consultation: 5 minute    *This eConsult is based on the clinical data available to me and is furnished without benefit of a physician examination.  The eConsult will need to be interpreted in light of any clinical issues of changes in patient status not available to me at the time rime of filing this eConsults.  Significant changes in patient condition of level of acuity should result in a referral for in person consultation and reevaluation.  Please alert me if you have any furth questions.     Thank you for this eConsult referral.     DO [facility]

## 2025-07-29 ENCOUNTER — HOSPITAL ENCOUNTER (EMERGENCY)
Facility: HOSPITAL | Age: 45
Discharge: HOME | End: 2025-07-29
Attending: EMERGENCY MEDICINE
Payer: MEDICAID

## 2025-07-29 VITALS
WEIGHT: 152 LBS | HEART RATE: 80 BPM | TEMPERATURE: 99 F | SYSTOLIC BLOOD PRESSURE: 109 MMHG | HEIGHT: 64 IN | BODY MASS INDEX: 25.95 KG/M2 | DIASTOLIC BLOOD PRESSURE: 63 MMHG | OXYGEN SATURATION: 100 % | RESPIRATION RATE: 16 BRPM

## 2025-07-29 DIAGNOSIS — K59.04 CHRONIC IDIOPATHIC CONSTIPATION: ICD-10-CM

## 2025-07-29 DIAGNOSIS — R10.30 LOWER ABDOMINAL PAIN: Primary | ICD-10-CM

## 2025-07-29 DIAGNOSIS — N83.202 LEFT OVARIAN CYST: ICD-10-CM

## 2025-07-29 LAB
ABSOLUTE EOSINOPHIL (OHS): 0.07 K/UL
ABSOLUTE MONOCYTE (OHS): 0.46 K/UL (ref 0.3–1)
ABSOLUTE NEUTROPHIL COUNT (OHS): 4.8 K/UL (ref 1.8–7.7)
ALBUMIN SERPL BCP-MCNC: 4.2 G/DL (ref 3.5–5.2)
ALP SERPL-CCNC: 83 UNIT/L (ref 40–150)
ALT SERPL W/O P-5'-P-CCNC: 34 UNIT/L (ref 10–44)
ANION GAP (OHS): 6 MMOL/L (ref 8–16)
AST SERPL-CCNC: 30 UNIT/L (ref 11–45)
B-HCG UR QL: NEGATIVE
BASOPHILS # BLD AUTO: 0.03 K/UL
BASOPHILS NFR BLD AUTO: 0.4 %
BILIRUB SERPL-MCNC: 0.4 MG/DL (ref 0.1–1)
BILIRUB UR QL STRIP.AUTO: NEGATIVE
BUN SERPL-MCNC: 11 MG/DL (ref 6–20)
CALCIUM SERPL-MCNC: 9 MG/DL (ref 8.7–10.5)
CHLORIDE SERPL-SCNC: 108 MMOL/L (ref 95–110)
CLARITY UR: CLEAR
CO2 SERPL-SCNC: 24 MMOL/L (ref 23–29)
COLOR UR AUTO: NORMAL
CREAT SERPL-MCNC: 0.6 MG/DL (ref 0.5–1.4)
CREAT SERPL-MCNC: 0.7 MG/DL (ref 0.5–1.4)
CTP QC/QA: YES
ERYTHROCYTE [DISTWIDTH] IN BLOOD BY AUTOMATED COUNT: 12.6 % (ref 11.5–14.5)
GFR SERPLBLD CREATININE-BSD FMLA CKD-EPI: >60 ML/MIN/1.73/M2
GLUCOSE SERPL-MCNC: 93 MG/DL (ref 70–110)
GLUCOSE UR QL STRIP: NEGATIVE
HCT VFR BLD AUTO: 38.8 % (ref 37–48.5)
HGB BLD-MCNC: 13.5 GM/DL (ref 12–16)
HGB UR QL STRIP: NEGATIVE
IMM GRANULOCYTES # BLD AUTO: 0.04 K/UL (ref 0–0.04)
IMM GRANULOCYTES NFR BLD AUTO: 0.6 % (ref 0–0.5)
KETONES UR QL STRIP: NEGATIVE
LEUKOCYTE ESTERASE UR QL STRIP: NEGATIVE
LIPASE SERPL-CCNC: 35 U/L (ref 4–60)
LYMPHOCYTES # BLD AUTO: 1.76 K/UL (ref 1–4.8)
MAGNESIUM SERPL-MCNC: 2.1 MG/DL (ref 1.6–2.6)
MCH RBC QN AUTO: 30.7 PG (ref 27–31)
MCHC RBC AUTO-ENTMCNC: 34.8 G/DL (ref 32–36)
MCV RBC AUTO: 88 FL (ref 82–98)
NITRITE UR QL STRIP: NEGATIVE
NUCLEATED RBC (/100WBC) (OHS): 0 /100 WBC
PH UR STRIP: 6 [PH]
PLATELET # BLD AUTO: 305 K/UL (ref 150–450)
PMV BLD AUTO: 9.5 FL (ref 9.2–12.9)
POTASSIUM SERPL-SCNC: 4.2 MMOL/L (ref 3.5–5.1)
PROT SERPL-MCNC: 7.4 GM/DL (ref 6–8.4)
PROT UR QL STRIP: NEGATIVE
RBC # BLD AUTO: 4.4 M/UL (ref 4–5.4)
RELATIVE EOSINOPHIL (OHS): 1 %
RELATIVE LYMPHOCYTE (OHS): 24.6 % (ref 18–48)
RELATIVE MONOCYTE (OHS): 6.4 % (ref 4–15)
RELATIVE NEUTROPHIL (OHS): 67 % (ref 38–73)
SAMPLE: NORMAL
SODIUM SERPL-SCNC: 138 MMOL/L (ref 136–145)
SP GR UR STRIP: 1
UROBILINOGEN UR STRIP-ACNC: NEGATIVE EU/DL
WBC # BLD AUTO: 7.16 K/UL (ref 3.9–12.7)

## 2025-07-29 PROCEDURE — 63600175 PHARM REV CODE 636 W HCPCS: Mod: JZ,TB | Performed by: EMERGENCY MEDICINE

## 2025-07-29 PROCEDURE — 82374 ASSAY BLOOD CARBON DIOXIDE: CPT | Performed by: PHYSICIAN ASSISTANT

## 2025-07-29 PROCEDURE — 85025 COMPLETE CBC W/AUTO DIFF WBC: CPT | Performed by: PHYSICIAN ASSISTANT

## 2025-07-29 PROCEDURE — 96374 THER/PROPH/DIAG INJ IV PUSH: CPT

## 2025-07-29 PROCEDURE — 25500020 PHARM REV CODE 255: Performed by: EMERGENCY MEDICINE

## 2025-07-29 PROCEDURE — 81003 URINALYSIS AUTO W/O SCOPE: CPT | Performed by: PHYSICIAN ASSISTANT

## 2025-07-29 PROCEDURE — 83690 ASSAY OF LIPASE: CPT | Performed by: PHYSICIAN ASSISTANT

## 2025-07-29 PROCEDURE — 99285 EMERGENCY DEPT VISIT HI MDM: CPT | Mod: 25

## 2025-07-29 PROCEDURE — 82565 ASSAY OF CREATININE: CPT | Mod: 91

## 2025-07-29 PROCEDURE — 81025 URINE PREGNANCY TEST: CPT | Performed by: PHYSICIAN ASSISTANT

## 2025-07-29 PROCEDURE — 83735 ASSAY OF MAGNESIUM: CPT | Performed by: EMERGENCY MEDICINE

## 2025-07-29 RX ORDER — KETOROLAC TROMETHAMINE 30 MG/ML
15 INJECTION, SOLUTION INTRAMUSCULAR; INTRAVENOUS
Status: COMPLETED | OUTPATIENT
Start: 2025-07-29 | End: 2025-07-29

## 2025-07-29 RX ADMIN — KETOROLAC TROMETHAMINE 15 MG: 30 INJECTION, SOLUTION INTRAMUSCULAR; INTRAVENOUS at 08:07

## 2025-07-29 RX ADMIN — IOHEXOL 75 ML: 350 INJECTION, SOLUTION INTRAVENOUS at 08:07

## 2025-07-29 NOTE — FIRST PROVIDER EVALUATION
Emergency Department TeleTriage Encounter Note      CHIEF COMPLAINT    Chief Complaint   Patient presents with    Abdominal Pain     Lower abdominal pain 2 days. Seen at  and referred here for further evaluation.       VITAL SIGNS   Initial Vitals [07/29/25 1845]   BP Pulse Resp Temp SpO2   128/77 85 18 98.7 °F (37.1 °C) 100 %      MAP       --            ALLERGIES    Review of patient's allergies indicates:   Allergen Reactions    Oxycodone-acetaminophen Itching, Edema and Other (See Comments)     Starting taking Roxicet Friday.  Starting experieincing facial swelling and general itching Saturday.  Starting taking Roxicet Friday.  Starting experieincing facial swelling and general itching Saturday.    hallucinations    Acetaminophen Other (See Comments)     hallucinations       PROVIDER TRIAGE NOTE  Patient presents with RLQ pain for 2 days. She was seen at urgent care and referred to the ED. She reports history of constipation, but took meds and was able to have a BM last night.       ORDERS  Labs Reviewed - No data to display    ED Orders (720h ago, onward)      None              Virtual Visit Note: The provider triage portion of this emergency department evaluation and documentation was performed via [employer], a HIPAA-compliant telemedicine application, in concert with a tele-presenter in the room. A face to face patient evaluation with one of my colleagues will occur once the patient is placed in an emergency department room.      DISCLAIMER: This note was prepared with [employer] voice recognition transcription software. Garbled syntax, mangled pronouns, and other bizarre constructions may be attributed to that software system.

## 2025-07-30 NOTE — ED NOTES
Pt requested more blankets. Warm blankets provided to pt, and chair reclined back for better comfort for pt. Pt states no other complaints, wants and/or needs at this time

## 2025-07-30 NOTE — ED PROVIDER NOTES
Encounter Date: 2025       History     Chief Complaint   Patient presents with    Abdominal Pain     Lower abdominal pain 2 days. Seen at  and referred here for further evaluation.     44-year-old female past medical history as above presents complaint of abdominal pain.  Patient says that she has a history of constipation and takes medication for this typically provide relief.  Says that she has been having lower abdominal pain for the past few days and went to urgent care this morning.  They noticed that she had some right lower quadrant abdominal pain and so advised that she go to the emergency department for evaluation.  Patient denies fever, chills, nausea, vomiting, dysuria, hematuria, increased pain with p.o. intake.  Says that she last had a bowel movement this morning.    The history is provided by the patient.     Review of patient's allergies indicates:   Allergen Reactions    Oxycodone-acetaminophen Itching, Edema and Other (See Comments)     Starting taking Roxicet Friday.  Starting experieincing facial swelling and general itching Saturday.  Starting taking Roxicet Friday.  Starting experieincing facial swelling and general itching Saturday.    hallucinations    Acetaminophen Other (See Comments)     hallucinations     Past Medical History:   Diagnosis Date    Constipation     PONV (postoperative nausea and vomiting)      Past Surgical History:   Procedure Laterality Date     SECTION      COLONOSCOPY N/A 2025    Procedure: COLONOSCOPY;  Surgeon: Robert Arevalo MD;  Location: [facility];  Service: Endoscopy;  Laterality: N/A;    CYSTOSCOPY N/A 2024    Procedure: CYSTOSCOPY;  Surgeon: Giselle Chaudhary MD;  Location: [facility] OR;  Service: OB/GYN;  Laterality: N/A;    ENDOMETRIAL ABLATION N/A 03/10/2022    Procedure: ABLATION, ENDOMETRIUM;  Surgeon: Royce Ruiz MD;  Location: [facility] OR;  Service: OB/GYN;  Laterality: N/A;    HYSTERECTOMY, TOTAL, LAPAROSCOPIC, WITH  SALPINGECTOMY N/A 08/22/2024    Procedure: HYSTERECTOMY,TOTAL,LAPAROSCOPIC,WITH SALPINGECTOMY;  Surgeon: Giselle Chaudhary MD;  Location: [facility] OR;  Service: OB/GYN;  Laterality: N/A;    HYSTEROSCOPY WITH DILATION AND CURETTAGE OF UTERUS N/A 03/10/2022    Procedure: HYSTEROSCOPY, WITH DILATION AND CURETTAGE OF UTERUS;  Surgeon: Royce Ruiz MD;  Location: [facility] OR;  Service: OB/GYN;  Laterality: N/A;    TUBAL LIGATION       Family History   Problem Relation Name Age of Onset    Hypertension Mother      Cervical cancer Maternal Aunt      Ovarian cancer Maternal Aunt      Cancer Paternal Aunt      Breast cancer Maternal Grandmother       Social History[1]  Review of Systems    Physical Exam     Initial Vitals [07/29/25 1845]   BP Pulse Resp Temp SpO2   128/77 85 18 98.7 °F (37.1 °C) 100 %      MAP       --         Physical Exam    Nursing note and vitals reviewed.  Constitutional: She appears well-developed. She is not diaphoretic. No distress.   HENT:   Head: Normocephalic and atraumatic.   Eyes: EOM are normal. Pupils are equal, round, and reactive to light.   Neck:   Normal range of motion.  Cardiovascular:  Normal rate.           Pulmonary/Chest: No respiratory distress.   Abdominal: Abdomen is soft. She exhibits no distension. There is abdominal tenderness (Mild right lower quadrant tenderness to deep palpation.).   Musculoskeletal:         General: Normal range of motion.      Cervical back: Normal range of motion.     Neurological: She is alert and oriented to person, place, and time.   Skin: Skin is warm and dry.   Psychiatric: She has a normal mood and affect.         ED Course   Procedures  Labs Reviewed   COMPREHENSIVE METABOLIC PANEL - Abnormal       Result Value    Sodium 138      Potassium 4.2      Chloride 108      CO2 24      Glucose 93      BUN 11      Creatinine 0.6      Calcium 9.0      Protein Total 7.4      Albumin 4.2      Bilirubin Total 0.4      ALP 83      AST 30      ALT 34      Anion  Gap 6 (*)     eGFR >60     CBC WITH DIFFERENTIAL - Abnormal    WBC 7.16      RBC 4.40      HGB 13.5      HCT 38.8      MCV 88      MCH 30.7      MCHC 34.8      RDW 12.6      Platelet Count 305      MPV 9.5      Nucleated RBC 0      Neut % 67.0      Lymph % 24.6      Mono % 6.4      Eos % 1.0      Basophil % 0.4      Imm Grans % 0.6 (*)     Neut # 4.80      Lymph # 1.76      Mono # 0.46      Eos # 0.07      Baso # 0.03      Imm Grans # 0.04     LIPASE - Normal    Lipase Level 35     URINALYSIS, REFLEX TO URINE CULTURE - Normal    Color, UA Straw      Appearance, UA Clear      pH, UA 6.0      Spec Grav UA 1.005      Protein, UA Negative      Glucose, UA Negative      Ketones, UA Negative      Bilirubin, UA Negative      Blood, UA Negative      Nitrites, UA Negative      Urobilinogen, UA Negative      Leukocyte Esterase, UA Negative     MAGNESIUM - Normal    Magnesium  2.1     CBC W/ AUTO DIFFERENTIAL    Narrative:     The following orders were created for panel order CBC W/ AUTO DIFFERENTIAL.  Procedure                               Abnormality         Status                     ---------                               -----------         ------                     CBC with Differential[[id number]]       Abnormal            Final result                 Please view results for these tests on the individual orders.   GREY TOP URINE HOLD   POCT URINE PREGNANCY    POC Preg Test, Ur Negative       Acceptable Yes     ISTAT CREATININE    POC Creatinine 0.7      Sample VENOUS            Imaging Results              CT Abdomen Pelvis With IV Contrast NO Oral Contrast (Final result)  Result time 07/29/25 21:36:26      Final result by Doretha Ibarra MD (07/29/25 21:36:26)                   Impression:      No significant acute abnormality in the abdomen or pelvis noting 1.4 and 1.7 cm cystic foci in the left ovary.    No other acute finding in the abdomen or pelvis.    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    Nonspecific asymmetric question slight prominence of the left adrenal gland.      Electronically signed by: Doretha Arvizutri  Date:    07/29/2025  Time:    21:36               Narrative:    EXAMINATION:  CT ABDOMEN PELVIS WITH IV CONTRAST    CLINICAL HISTORY:  RLQ abdominal pain (Age >= 14y);    TECHNIQUE:  Low dose axial images, sagittal and coronal reformations were obtained from the lung bases to the pubic symphysis following the IV administration of 75 mL of Omnipaque 350 .    COMPARISON:  CT abdomen pelvis 03/17/2025, MRI abdomen 09/11/2023 2    FINDINGS:  Lung bases appear clear allowing for minor dependent atelectasis.  There is no pleural or pericardial effusion.  There is no evidence of cardiomegaly.    There is a stable 2 x 2.3 cm low density in the right lobe of the liver with nodular focus of peripheral enhancement similar to prior exam compatible with hemangioma.  There is a more vague area of enhancement in the right lobe of the liver more inferiorly axial image 50 series 2 corresponding to prior described FNH as seen on MRI.  Liver is otherwise unremarkable with no acute abnormality.  Spleen, pancreas and gallbladder are unremarkable.  There is asymmetric prominence of the left adrenal gland similar to prior exams noting a small area of central calcification.  Right adrenal gland is unremarkable.    Stomach and small bowel are unremarkable.  Appendix is normal.  Colon is unremarkable.    Bladder appears normal.  Patient is post hysterectomy.  There are 1.7 and a 1.4 cm left ovarian cystic foci.  No right adnexal masses.  No free fluid or adenopathy in the pelvis or in the abdomen.    Aorta appears normal.  There is no retroperitoneal adenopathy or inflammation.    Osseous structures grossly appear intact.                                       Medications   ketorolac injection 15 mg (15 mg Intravenous Given 7/29/25 2026)   iohexoL (OMNIPAQUE 350) injection 75 mL  (75 mLs Intravenous Given 7/29/25 2038)     Medical Decision Making  45 yo F w Pmhx as above p/w complaint of abdominal pain. Ddx includes but not limited to appendicitis, cholecystitis, cholangitis, pancreatitis, hepatitis, abdominal aortic aneurysm, diabetic ketoacidosis, bowel obstruction, intra-abdominal perforation, intra-abdominal infection vs. abscess, nephrolithiasis, pyelonephritis, urinary tract infection, electrolyte and/or metabolic abnormality, ovarian torsion, shingles. No acute lab abnormalities. CT A/P w/o acute pathology including on my independent review. Pt informed about incidental findings of left ovarian cyst. Says she already has known hepatic hemangioma. Discharged with strict return precautions and outpatient f/u. Given refill of her Linzess.     No acute emergent medical condition has been identified. The patient appears to be low risk for an emergent medical condition is appropriate for discharge with outpatient f/u as detailed in discharge instructions for reevaluation and possible continued outpatient workup and management. I have discussed the workup with the patient, who has verbalized understanding of the plan and need for outpatient follow-up.  This evaluation does not preclude the development of an emergent condition so in addition, I have advised the patient that they can return to the ED at any time with worsening or change of their symptoms, or with any other medical complaint.         Amount and/or Complexity of Data Reviewed  External Data Reviewed: notes.     Details: Seen 7/24/25 by ENT  Labs: ordered. Decision-making details documented in ED Course.  Radiology: ordered and independent interpretation performed.    Risk  Prescription drug management.               ED Course as of 07/29/25 2207   Tue Jul 29, 2025 2003 WBC: 7.16  Normal  [AT]   2003 Urinalysis, Reflex to Urine Culture Urine, Clean Catch  Normal  [AT]   2004 hCG Qualitative, Urine: Negative [AT]   2010  Creatinine: 0.6  Normal  [AT]   2155 CT A/P Impression:   No significant acute abnormality in the abdomen or pelvis noting 1.4 and 1.7 cm cystic foci in the left ovary.  No other acute finding in the abdomen or pelvis.   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   Nonspecific asymmetric question slight prominence of the left adrenal gland. [AT]      ED Course User Index  [AT] Raisa Gruber MD                               Clinical Impression:  Final diagnoses:  [R10.30] Lower abdominal pain (Primary)  [N83.202] Left ovarian cyst          ED Disposition Condition    Discharge Stable          ED Prescriptions       Medication Sig Dispense Start Date End Date Auth. Provider    linaCLOtide (LINZESS) 72 mcg Cap capsule Take 1 capsule (72 mcg total) by mouth once daily. 30 capsule 7/29/2025 8/28/2025 Raisa Gruber MD          Follow-up Information       Follow up With Specialties Details Why Contact Info    Silverio Franco MD Family Medicine Schedule an appointment as soon as possible for a visit in 2 days  [address]  [facility] [address] [phone]      [facility] - Emergency Dept Emergency Medicine  As needed, If symptoms worsen [address] [facility] [address] [phone]                   [1]   Social History  Tobacco Use    Smoking status: Never     Passive exposure: Never    Smokeless tobacco: Never   Substance Use Topics    Alcohol use: No    Drug use: Never        Raisa Gruber MD  07/29/25 0457

## 2025-07-30 NOTE — DISCHARGE INSTRUCTIONS

## 2025-07-30 NOTE — ED NOTES
Went to give pt medications, pt not in recliner, pt currently in CT. Waiting for pt to return to give medication

## 2025-07-31 ENCOUNTER — TELEPHONE (OUTPATIENT)
Dept: ENDOCRINOLOGY | Facility: CLINIC | Age: 45
End: 2025-07-31
Payer: MEDICAID

## 2025-07-31 DIAGNOSIS — E04.1 THYROID NODULE: Primary | ICD-10-CM

## 2025-07-31 NOTE — TELEPHONE ENCOUNTER
Tried reaching out to patient to schedule Referral for [facility]DC w/ FNA. Pt doesn't have VM set up. Scheduled patient and mailed appointment letter

## 2025-08-19 ENCOUNTER — OFFICE VISIT (OUTPATIENT)
Dept: UROGYNECOLOGY | Facility: CLINIC | Age: 45
End: 2025-08-19
Payer: MEDICAID

## 2025-08-19 VITALS
WEIGHT: 154.75 LBS | BODY MASS INDEX: 26.42 KG/M2 | HEIGHT: 64 IN | DIASTOLIC BLOOD PRESSURE: 75 MMHG | SYSTOLIC BLOOD PRESSURE: 119 MMHG | HEART RATE: 94 BPM

## 2025-08-19 DIAGNOSIS — N39.41 URGE INCONTINENCE OF URINE: ICD-10-CM

## 2025-08-19 DIAGNOSIS — M62.89 HIGH-TONE PELVIC FLOOR DYSFUNCTION: Primary | ICD-10-CM

## 2025-08-19 DIAGNOSIS — N39.3 STRESS INCONTINENCE: ICD-10-CM

## 2025-08-19 DIAGNOSIS — N95.8 GENITOURINARY SYNDROME OF MENOPAUSE: ICD-10-CM

## 2025-08-19 DIAGNOSIS — R39.15 URINARY URGENCY: ICD-10-CM

## 2025-08-19 DIAGNOSIS — R30.0 BURNING WITH URINATION: ICD-10-CM

## 2025-08-19 DIAGNOSIS — R39.89 BLADDER PAIN: ICD-10-CM

## 2025-08-19 LAB
BILIRUB SERPL-MCNC: NORMAL MG/DL
BLOOD URINE, POC: NORMAL
CLARITY, POC UA: CLEAR
COLOR, POC UA: NORMAL
GLUCOSE UR QL STRIP: NORMAL
KETONES UR QL STRIP: NORMAL
LEUKOCYTE ESTERASE URINE, POC: NORMAL
NITRITE, POC UA: NORMAL
PH, POC UA: 5
POC RESIDUAL URINE VOLUME: 85 ML (ref 0–100)
PROTEIN, POC: NORMAL
SPECIFIC GRAVITY, POC UA: 1
UROBILINOGEN, POC UA: NORMAL

## 2025-08-19 PROCEDURE — 3074F SYST BP LT 130 MM HG: CPT | Mod: CPTII,,, | Performed by: OBSTETRICS & GYNECOLOGY

## 2025-08-19 PROCEDURE — G2211 COMPLEX E/M VISIT ADD ON: HCPCS | Mod: ,,, | Performed by: OBSTETRICS & GYNECOLOGY

## 2025-08-19 PROCEDURE — 3078F DIAST BP <80 MM HG: CPT | Mod: CPTII,,, | Performed by: OBSTETRICS & GYNECOLOGY

## 2025-08-19 PROCEDURE — 99214 OFFICE O/P EST MOD 30 MIN: CPT | Mod: PBBFAC | Performed by: OBSTETRICS & GYNECOLOGY

## 2025-08-19 PROCEDURE — 51798 US URINE CAPACITY MEASURE: CPT | Mod: PBBFAC | Performed by: OBSTETRICS & GYNECOLOGY

## 2025-08-19 PROCEDURE — 3044F HG A1C LEVEL LT 7.0%: CPT | Mod: CPTII,,, | Performed by: OBSTETRICS & GYNECOLOGY

## 2025-08-19 PROCEDURE — 99999 PR PBB SHADOW E&M-EST. PATIENT-LVL IV: CPT | Mod: PBBFAC,,, | Performed by: OBSTETRICS & GYNECOLOGY

## 2025-08-19 PROCEDURE — 87563 M. GENITALIUM AMP PROBE: CPT | Performed by: OBSTETRICS & GYNECOLOGY

## 2025-08-19 PROCEDURE — 99999PBSHW POCT BLADDER SCAN: Mod: PBBFAC,,,

## 2025-08-19 PROCEDURE — 99999PBSHW POCT URINE DIPSTICK WITHOUT MICROSCOPE: Mod: PBBFAC,,,

## 2025-08-19 PROCEDURE — 81002 URINALYSIS NONAUTO W/O SCOPE: CPT | Mod: PBBFAC | Performed by: OBSTETRICS & GYNECOLOGY

## 2025-08-19 PROCEDURE — 1159F MED LIST DOCD IN RCRD: CPT | Mod: CPTII,,, | Performed by: OBSTETRICS & GYNECOLOGY

## 2025-08-19 PROCEDURE — 87086 URINE CULTURE/COLONY COUNT: CPT | Performed by: OBSTETRICS & GYNECOLOGY

## 2025-08-19 PROCEDURE — 99215 OFFICE O/P EST HI 40 MIN: CPT | Mod: S$PBB,,, | Performed by: OBSTETRICS & GYNECOLOGY

## 2025-08-19 PROCEDURE — 87798 DETECT AGENT NOS DNA AMP: CPT | Mod: 59 | Performed by: OBSTETRICS & GYNECOLOGY

## 2025-08-19 PROCEDURE — 1160F RVW MEDS BY RX/DR IN RCRD: CPT | Mod: CPTII,,, | Performed by: OBSTETRICS & GYNECOLOGY

## 2025-08-19 PROCEDURE — 99999PBSHW PR PBB SHADOW TECHNICAL ONLY FILED TO HB: Mod: PBBFAC,,,

## 2025-08-19 PROCEDURE — 3008F BODY MASS INDEX DOCD: CPT | Mod: CPTII,,, | Performed by: OBSTETRICS & GYNECOLOGY

## 2025-08-19 RX ORDER — ESTRADIOL 0.1 MG/G
CREAM VAGINAL
Qty: 42.5 G | Refills: 3 | Status: SHIPPED | OUTPATIENT
Start: 2025-08-19

## 2025-08-21 LAB
BACTERIA UR CULT: NORMAL
MYCOPLASMA GENITALIUM RESULT: NEGATIVE
SPECIMEN SOURCE: NORMAL

## 2025-08-22 LAB
SPECIMEN SOURCE: NORMAL
U PARVUM DNA SPEC QL NAA+PROBE: NEGATIVE
U UREALYTICUM DNA SPEC QL NAA+PROBE: NEGATIVE

## 2025-08-26 ENCOUNTER — CLINICAL SUPPORT (OUTPATIENT)
Dept: REHABILITATION | Facility: HOSPITAL | Age: 45
End: 2025-08-26
Attending: STUDENT IN AN ORGANIZED HEALTH CARE EDUCATION/TRAINING PROGRAM
Payer: MEDICAID

## 2025-08-26 DIAGNOSIS — R10.2 PELVIC PAIN: Primary | ICD-10-CM

## 2025-08-26 PROCEDURE — 97162 PT EVAL MOD COMPLEX 30 MIN: CPT | Mod: PO

## 2025-08-26 PROCEDURE — 97110 THERAPEUTIC EXERCISES: CPT | Mod: PO

## 2025-09-03 ENCOUNTER — PATIENT MESSAGE (OUTPATIENT)
Dept: GASTROENTEROLOGY | Facility: CLINIC | Age: 45
End: 2025-09-03
Payer: MEDICAID

## 2025-09-03 DIAGNOSIS — K52.9 COLITIS: Primary | ICD-10-CM

## 2025-09-04 RX ORDER — METRONIDAZOLE 500 MG/1
500 TABLET ORAL EVERY 8 HOURS
Qty: 21 TABLET | Refills: 0 | Status: SHIPPED | OUTPATIENT
Start: 2025-09-04 | End: 2025-09-11

## 2025-09-04 RX ORDER — CIPROFLOXACIN 500 MG/1
500 TABLET, FILM COATED ORAL EVERY 12 HOURS
Qty: 14 TABLET | Refills: 0 | Status: SHIPPED | OUTPATIENT
Start: 2025-09-04 | End: 2025-09-11

## (undated) DEVICE — SEALER LIGASURE LAP 37CM 5MM

## (undated) DEVICE — SEE MEDLINE ITEM 157116

## (undated) DEVICE — DRESSING AQUACEL SACRAL 8 X 7

## (undated) DEVICE — PANTIES FEMININE NAPKIN LG/XLG

## (undated) DEVICE — SEE MEDLINE ITEM 156955

## (undated) DEVICE — COVER OVERHEAD SURG LT BLUE

## (undated) DEVICE — PACK SURGERY START

## (undated) DEVICE — TRAY FOLEY 16FR INFECTION CONT

## (undated) DEVICE — DEVICE ABLATION NOVASURE DISP

## (undated) DEVICE — IRRIGATOR ENDOSCOPY DISP.

## (undated) DEVICE — DRAPE THREE-QTR REINF 53X77IN

## (undated) DEVICE — Device

## (undated) DEVICE — GLOVE BIOGEL PIMICRO INDIC 6.5

## (undated) DEVICE — TROCAR KII FIOS 11MM X 100MM

## (undated) DEVICE — SUT VICRYL PLUS 0 CT1 36IN

## (undated) DEVICE — GOWN POLY REINF BRTH SLV LG

## (undated) DEVICE — SYR 50CC LL

## (undated) DEVICE — CONTAINER MULTIPURPOSE/SPECIME

## (undated) DEVICE — TIP RUMI BLUE DISPOSABLE 5/BX

## (undated) DEVICE — DRESSING TELFA N ADH 3X8

## (undated) DEVICE — GLOVE SURGICAL LATEX SZ 6.5

## (undated) DEVICE — GLOVE BIOGEL ECLIPSE SZ 6.5

## (undated) DEVICE — OCCLUDER COLPO-PNEUMO STERILE

## (undated) DEVICE — APPLICATOR CHLORAPREP ORN 26ML

## (undated) DEVICE — APPLICATOR ARISTA FLEX XL

## (undated) DEVICE — GOWN POLY REINF BRTH SLV XL

## (undated) DEVICE — NDL INSUF ULTRA VERESS 120MM

## (undated) DEVICE — KIT ANTIFOG

## (undated) DEVICE — SUT MONOCRYL 3-0 PS-2 UND

## (undated) DEVICE — DRESSING TEGADERM 2 3/8 X 2.75

## (undated) DEVICE — SUT MONO 2-0 CT-1 UNDYED

## (undated) DEVICE — BLADE SURG CARBON STEEL SZ11

## (undated) DEVICE — DRAPE LAVH SURG 109X109X100IN

## (undated) DEVICE — SUT 0 VICRYL / UR6 (J603)

## (undated) DEVICE — MANIFOLD 4 PORT

## (undated) DEVICE — PAD PREP 50/CA

## (undated) DEVICE — KIT WING PAD POSITIONING

## (undated) DEVICE — GAUZE WOVEN STRL 8PLY 2X2IN

## (undated) DEVICE — POWDER ARISTA AH 3G

## (undated) DEVICE — CONTAINERS 32OZ

## (undated) DEVICE — JELLY LUBRICANT STERILE 4 OZ

## (undated) DEVICE — PACK ECLIPSE BASIC III SURG

## (undated) DEVICE — SYR 10CC LUER LOCK

## (undated) DEVICE — ELECTRODE REM PLYHSV RETURN 9

## (undated) DEVICE — PAD CNTOUR SUP-ABSRB POSTPRTM

## (undated) DEVICE — SOL NS 1000CC

## (undated) DEVICE — TROCAR KII FIOS 5MM X 100MM

## (undated) DEVICE — CATH URETHRAL 16FR RED

## (undated) DEVICE — TUBING INSUFFLATION 10

## (undated) DEVICE — DRAPE LEGGINGS CUFF 31X48IN

## (undated) DEVICE — SEE MEDLINE ITEM 154981

## (undated) DEVICE — DRAPE SURGICAL STERI IRRG PCH

## (undated) DEVICE — DRAPE UNDERBUTTOCKS PCH STRL

## (undated) DEVICE — TOWEL OR XRAY BLUE 17X26IN

## (undated) DEVICE — ENDOSTITCH INSTRUMENT